# Patient Record
Sex: FEMALE | Race: WHITE | NOT HISPANIC OR LATINO | Employment: OTHER | URBAN - NONMETROPOLITAN AREA
[De-identification: names, ages, dates, MRNs, and addresses within clinical notes are randomized per-mention and may not be internally consistent; named-entity substitution may affect disease eponyms.]

---

## 2017-01-01 ENCOUNTER — HOSPITAL ENCOUNTER (EMERGENCY)
Facility: HOSPITAL | Age: 82
Discharge: HOME | End: 2017-01-01
Attending: EMERGENCY MEDICINE | Admitting: EMERGENCY MEDICINE

## 2017-01-01 ENCOUNTER — APPOINTMENT (OUTPATIENT)
Dept: GENERAL RADIOLOGY | Facility: HOSPITAL | Age: 82
End: 2017-01-01

## 2017-01-01 VITALS
WEIGHT: 155 LBS | HEIGHT: 59 IN | OXYGEN SATURATION: 98 % | SYSTOLIC BLOOD PRESSURE: 163 MMHG | TEMPERATURE: 97.5 F | RESPIRATION RATE: 19 BRPM | HEART RATE: 69 BPM | DIASTOLIC BLOOD PRESSURE: 90 MMHG | BODY MASS INDEX: 31.25 KG/M2

## 2017-01-01 DIAGNOSIS — M25.561 ACUTE PAIN OF RIGHT KNEE: Primary | ICD-10-CM

## 2017-01-01 PROCEDURE — 73562 X-RAY EXAM OF KNEE 3: CPT

## 2017-01-01 PROCEDURE — 99284 EMERGENCY DEPT VISIT MOD MDM: CPT

## 2017-01-01 RX ORDER — HYDROCODONE BITARTRATE AND ACETAMINOPHEN 7.5; 325 MG/1; MG/1
1 TABLET ORAL ONCE
Status: COMPLETED | OUTPATIENT
Start: 2017-01-01 | End: 2017-01-01

## 2017-01-01 RX ORDER — ALENDRONATE SODIUM 70 MG/1
70 TABLET ORAL
COMMUNITY
End: 2019-05-08

## 2017-01-01 RX ORDER — ATORVASTATIN CALCIUM 10 MG/1
10 TABLET, FILM COATED ORAL
COMMUNITY
End: 2019-05-08

## 2017-01-01 RX ORDER — KETOROLAC TROMETHAMINE 10 MG/1
10 TABLET, FILM COATED ORAL EVERY 6 HOURS PRN
Qty: 8 TABLET | Refills: 0 | Status: SHIPPED | OUTPATIENT
Start: 2017-01-01 | End: 2017-05-02 | Stop reason: ALTCHOICE

## 2017-01-01 RX ORDER — LEVOTHYROXINE SODIUM 0.07 MG/1
75 TABLET ORAL DAILY
COMMUNITY
End: 2018-07-28 | Stop reason: HOSPADM

## 2017-01-01 RX ORDER — GLIMEPIRIDE 1 MG/1
1 TABLET ORAL DAILY
COMMUNITY
End: 2018-07-28 | Stop reason: HOSPADM

## 2017-01-01 RX ADMIN — HYDROCODONE BITARTRATE AND ACETAMINOPHEN 1 TABLET: 7.5; 325 TABLET ORAL at 16:32

## 2017-01-01 NOTE — ED PROVIDER NOTES
Subjective   HPI Comments: Patient is an 88-year-old female who reports she has had right knee pain since Friday (12/30/2016), but the pain is worse today.  She reports the pain is worsened by standing.  She denies any acute trauma injury.      History provided by:  Patient (Daughter)      Review of Systems   Constitutional: Negative.    HENT: Negative.    Eyes: Negative.    Respiratory: Negative.    Cardiovascular: Negative.    Gastrointestinal: Negative.    Endocrine: Negative.    Genitourinary: Negative.    Musculoskeletal: Negative.         Right knee pain   Skin: Negative.    Allergic/Immunologic: Negative.    Neurological: Negative.    Hematological: Negative.    Psychiatric/Behavioral: Negative.    All other systems reviewed and are negative.      Past Medical History   Diagnosis Date   • Diabetes mellitus    • Hypertension    • Hypothyroid        Allergies   Allergen Reactions   • Levaquin [Levofloxacin] Rash   • Rocephin [Ceftriaxone] Rash   • Vancomycin Rash       Past Surgical History   Procedure Laterality Date   • Pericardial window         History reviewed. No pertinent family history.    Social History     Social History   • Marital status:      Spouse name: N/A   • Number of children: N/A   • Years of education: N/A     Social History Main Topics   • Smoking status: Former Smoker   • Smokeless tobacco: None   • Alcohol use No   • Drug use: No   • Sexual activity: Not Asked     Other Topics Concern   • None     Social History Narrative   • None           Objective   Physical Exam   Constitutional: She is oriented to person, place, and time. She appears well-developed and well-nourished.   HENT:   Head: Normocephalic and atraumatic.   Right Ear: External ear normal.   Left Ear: External ear normal.   Nose: Nose normal.   Mouth/Throat: Oropharynx is clear and moist.   Eyes: Conjunctivae and EOM are normal. Pupils are equal, round, and reactive to light. Right eye exhibits no discharge. Left eye  exhibits no discharge.   Neck: Normal range of motion. Neck supple. No tracheal deviation present. No thyromegaly present.   Cardiovascular: Normal rate, regular rhythm, normal heart sounds and intact distal pulses.    No murmur heard.  Pulmonary/Chest: Effort normal and breath sounds normal. No respiratory distress. She exhibits no tenderness.   Abdominal: Soft. She exhibits no distension. There is no tenderness.   Musculoskeletal: Normal range of motion. She exhibits tenderness (Right anterior & posterior knee pain with palpation and flexion/extension of the knee.). She exhibits no edema or deformity.   Neurological: She is alert and oriented to person, place, and time. No cranial nerve deficit.   Skin: Skin is warm and dry. No erythema. No pallor.   Psychiatric: She has a normal mood and affect. Judgment normal.   Nursing note and vitals reviewed.      Procedures         ED Course  ED Course                  MDM  Number of Diagnoses or Management Options  Acute pain of right knee: new and requires workup     Amount and/or Complexity of Data Reviewed  Tests in the radiology section of CPT®: ordered and reviewed    Risk of Complications, Morbidity, and/or Mortality  Presenting problems: low  Diagnostic procedures: low  Management options: low    Patient Progress  Patient progress: stable      Final diagnoses:   Acute pain of right knee            Jude Moreno MD  01/01/17 6005

## 2017-01-01 NOTE — ED NOTES
Called JANICE Stone at this time for splint application     Rosa Maria Shaw, ABDIAZIZ  01/01/17 7813

## 2017-01-12 ENCOUNTER — TRANSCRIBE ORDERS (OUTPATIENT)
Dept: ADMINISTRATIVE | Facility: HOSPITAL | Age: 82
End: 2017-01-12

## 2017-01-12 DIAGNOSIS — R60.0 LOCALIZED EDEMA: Primary | ICD-10-CM

## 2017-01-17 ENCOUNTER — HOSPITAL ENCOUNTER (OUTPATIENT)
Dept: ULTRASOUND IMAGING | Facility: HOSPITAL | Age: 82
Discharge: HOME | End: 2017-01-17
Admitting: INTERNAL MEDICINE

## 2017-01-17 DIAGNOSIS — R60.0 LOCALIZED EDEMA: ICD-10-CM

## 2017-01-17 PROCEDURE — 93971 EXTREMITY STUDY: CPT | Performed by: SURGERY

## 2017-01-17 PROCEDURE — 93971 EXTREMITY STUDY: CPT

## 2017-01-18 ENCOUNTER — TRANSCRIBE ORDERS (OUTPATIENT)
Dept: ADMINISTRATIVE | Facility: HOSPITAL | Age: 82
End: 2017-01-18

## 2017-01-18 DIAGNOSIS — M71.21 SYNOVIAL CYST OF RIGHT POPLITEAL SPACE: Primary | ICD-10-CM

## 2017-01-23 ENCOUNTER — HOSPITAL ENCOUNTER (OUTPATIENT)
Dept: MRI IMAGING | Facility: HOSPITAL | Age: 82
Discharge: HOME | End: 2017-01-23
Admitting: INTERNAL MEDICINE

## 2017-01-23 DIAGNOSIS — M71.21 SYNOVIAL CYST OF RIGHT POPLITEAL SPACE: ICD-10-CM

## 2017-01-23 PROCEDURE — 73721 MRI JNT OF LWR EXTRE W/O DYE: CPT

## 2017-05-02 ENCOUNTER — OFFICE VISIT (OUTPATIENT)
Dept: CARDIOLOGY | Facility: CLINIC | Age: 82
End: 2017-05-02

## 2017-05-02 VITALS
HEART RATE: 58 BPM | BODY MASS INDEX: 31.04 KG/M2 | DIASTOLIC BLOOD PRESSURE: 70 MMHG | SYSTOLIC BLOOD PRESSURE: 130 MMHG | WEIGHT: 154 LBS | HEIGHT: 59 IN

## 2017-05-02 DIAGNOSIS — I31.9 PERICARDIAL DISEASE: Primary | ICD-10-CM

## 2017-05-02 DIAGNOSIS — E78.2 MIXED HYPERLIPIDEMIA: ICD-10-CM

## 2017-05-02 DIAGNOSIS — I47.1 ATRIAL TACHYCARDIA (HCC): ICD-10-CM

## 2017-05-02 DIAGNOSIS — I10 ESSENTIAL HYPERTENSION: ICD-10-CM

## 2017-05-02 PROBLEM — I48.91 ATRIAL FIBRILLATION (HCC): Status: ACTIVE | Noted: 2017-05-02

## 2017-05-02 PROCEDURE — 93000 ELECTROCARDIOGRAM COMPLETE: CPT | Performed by: PHYSICIAN ASSISTANT

## 2017-05-02 PROCEDURE — 99214 OFFICE O/P EST MOD 30 MIN: CPT | Performed by: PHYSICIAN ASSISTANT

## 2017-05-02 RX ORDER — ESOMEPRAZOLE MAGNESIUM 40 MG/1
40 CAPSULE, DELAYED RELEASE ORAL DAILY PRN
COMMUNITY
End: 2019-05-08

## 2018-05-04 ENCOUNTER — OFFICE VISIT (OUTPATIENT)
Dept: CARDIOLOGY | Facility: CLINIC | Age: 83
End: 2018-05-04

## 2018-05-04 VITALS
HEIGHT: 60 IN | DIASTOLIC BLOOD PRESSURE: 62 MMHG | WEIGHT: 153 LBS | SYSTOLIC BLOOD PRESSURE: 120 MMHG | BODY MASS INDEX: 30.04 KG/M2 | HEART RATE: 67 BPM

## 2018-05-04 DIAGNOSIS — I10 ESSENTIAL HYPERTENSION: ICD-10-CM

## 2018-05-04 DIAGNOSIS — I47.1 ATRIAL TACHYCARDIA (HCC): Primary | ICD-10-CM

## 2018-05-04 DIAGNOSIS — E78.2 MIXED HYPERLIPIDEMIA: ICD-10-CM

## 2018-05-04 PROBLEM — I31.9 PERICARDIAL DISEASE: Status: RESOLVED | Noted: 2017-05-02 | Resolved: 2018-05-04

## 2018-05-04 PROCEDURE — 93000 ELECTROCARDIOGRAM COMPLETE: CPT | Performed by: INTERNAL MEDICINE

## 2018-05-04 PROCEDURE — 99213 OFFICE O/P EST LOW 20 MIN: CPT | Performed by: INTERNAL MEDICINE

## 2018-05-04 NOTE — PROGRESS NOTES
"Priya Mobley is a 89 y.o. female. FU of rhythm and bp    History of Present Illness     PAT:  Remote. No palpitations over the past year and remains very active for her age. Is compliant with meds that are well tolerated. EKG today is nsc    HLD:  Is followed by pcp with good reports on current statin    HTN:  Good clinic checks on current meds.    T2DM:  Good control by report but Metformin may be a better choice as far as the CV system is concerned. She \"has to be watched for hypoglycemia\" according to her daughter. She will ask her pcp about this.        The following portions of the patient's history were reviewed and updated as appropriate: allergies, current medications, past family history, past medical history, past social history, past surgical history and problem list.    Patient Active Problem List   Diagnosis   • Atrial tachycardia   • Essential hypertension   • Mixed hyperlipidemia       Allergies   Allergen Reactions   • Levaquin [Levofloxacin] Rash   • Rocephin [Ceftriaxone] Rash   • Vancomycin Rash       Family History   Problem Relation Age of Onset   • No Known Problems Mother    • No Known Problems Father        Social History     Social History   • Marital status:      Spouse name: N/A   • Number of children: N/A   • Years of education: N/A     Occupational History   • Not on file.     Social History Main Topics   • Smoking status: Former Smoker   • Smokeless tobacco: Never Used   • Alcohol use No   • Drug use: No   • Sexual activity: Not on file     Other Topics Concern   • Not on file     Social History Narrative   • No narrative on file         Current Outpatient Prescriptions:   •  alendronate (FOSAMAX) 70 MG tablet, Take 70 mg by mouth Every 7 (Seven) Days., Disp: , Rfl:   •  atorvastatin (LIPITOR) 10 MG tablet, Take 10 mg by mouth Daily., Disp: , Rfl:   •  esomeprazole (nexIUM) 40 MG capsule, Take 40 mg by mouth Every Morning Before Breakfast., Disp: , Rfl:   •  " "glimepiride (AMARYL) 1 MG tablet, Take 1 mg by mouth Daily., Disp: , Rfl:   •  levothyroxine (SYNTHROID, LEVOTHROID) 75 MCG tablet, Take 75 mcg by mouth Daily., Disp: , Rfl:   •  metoprolol tartrate (LOPRESSOR) 25 MG tablet, Take 25 mg by mouth Daily., Disp: , Rfl:     Past Surgical History:   Procedure Laterality Date   • BREAST CYST EXCISION     • PERICARDIAL WINDOW         Review of Systems   Constitutional: Negative for fatigue, fever and unexpected weight change.   Respiratory: Negative for apnea, chest tightness and shortness of breath.    Cardiovascular: Negative for chest pain, palpitations and leg swelling.   Gastrointestinal: Negative for abdominal pain.   Genitourinary: Negative for dysuria.   Musculoskeletal: Negative for myalgias.   Neurological: Negative for weakness and light-headedness.   Psychiatric/Behavioral: Negative for sleep disturbance.       /62   Pulse 67   Ht 152.4 cm (60\")   Wt 69.4 kg (153 lb)   BMI 29.88 kg/m²   Procedures    Objective   Physical Exam   Constitutional: She is oriented to person, place, and time. She appears well-developed and well-nourished.   HENT:   Head: Normocephalic.   Eyes: Pupils are equal, round, and reactive to light.   Neck: No thyromegaly present.   Cardiovascular: Normal rate, regular rhythm, normal heart sounds and intact distal pulses.  Exam reveals no gallop and no friction rub.    No murmur heard.  Pulmonary/Chest: Effort normal and breath sounds normal. No respiratory distress. She has no wheezes. She has no rales.   Abdominal: Soft. Bowel sounds are normal. She exhibits no distension and no mass. There is no tenderness. There is no guarding.   Musculoskeletal: She exhibits no edema or tenderness.   Neurological: She is alert and oriented to person, place, and time.   Skin: Skin is warm and dry.   Psychiatric: She has a normal mood and affect.       Assessment/Plan   Rashard was seen today for rapid heart rate, hypertension and " hyperlipidemia.    Diagnoses and all orders for this visit:    Atrial tachycardia  Comments:  NO PALPITATIONS NOW - CPT  Orders:  -     ECG 12 Lead    Essential hypertension  Comments:  CONTROLLED - CPT    Mixed hyperlipidemia  Comments:  CONTROLLED BY REPORT                 Return in about 1 year (around 5/4/2019).  Orders Placed This Encounter   Procedures   • ECG 12 Lead     Order Specific Question:   Reason for Exam:     Answer:   TACHY

## 2018-07-25 ENCOUNTER — APPOINTMENT (OUTPATIENT)
Dept: GENERAL RADIOLOGY | Facility: HOSPITAL | Age: 83
End: 2018-07-25

## 2018-07-25 ENCOUNTER — HOSPITAL ENCOUNTER (EMERGENCY)
Facility: HOSPITAL | Age: 83
Discharge: HOME | End: 2018-07-25
Attending: EMERGENCY MEDICINE | Admitting: EMERGENCY MEDICINE

## 2018-07-25 ENCOUNTER — APPOINTMENT (OUTPATIENT)
Dept: CT IMAGING | Facility: HOSPITAL | Age: 83
End: 2018-07-25

## 2018-07-25 VITALS
BODY MASS INDEX: 30.24 KG/M2 | HEART RATE: 78 BPM | HEIGHT: 59 IN | TEMPERATURE: 98.5 F | WEIGHT: 150 LBS | DIASTOLIC BLOOD PRESSURE: 66 MMHG | OXYGEN SATURATION: 98 % | SYSTOLIC BLOOD PRESSURE: 133 MMHG | RESPIRATION RATE: 18 BRPM

## 2018-07-25 DIAGNOSIS — G45.9 TRANSIENT CEREBRAL ISCHEMIA, UNSPECIFIED TYPE: Primary | ICD-10-CM

## 2018-07-25 LAB
ABO GROUP BLD: NORMAL
ALBUMIN SERPL-MCNC: 4.1 G/DL (ref 3.5–5)
ALBUMIN/GLOB SERPL: 1.5 G/DL (ref 1.1–2.5)
ALP SERPL-CCNC: 73 U/L (ref 24–120)
ALT SERPL W P-5'-P-CCNC: 34 U/L (ref 0–54)
ANION GAP SERPL CALCULATED.3IONS-SCNC: 10 MMOL/L (ref 4–13)
AST SERPL-CCNC: 30 U/L (ref 7–45)
ATMOSPHERIC PRESS: 750 MMHG
BASE EXCESS BLDV CALC-SCNC: 3 MMOL/L (ref 0–2)
BASOPHILS # BLD AUTO: 0.04 10*3/MM3 (ref 0–0.2)
BASOPHILS NFR BLD AUTO: 0.3 % (ref 0–2)
BDY SITE: ABNORMAL
BILIRUB SERPL-MCNC: 0.8 MG/DL (ref 0.1–1)
BILIRUB UR QL STRIP: NEGATIVE
BLD GP AB SCN SERPL QL: NEGATIVE
BODY TEMPERATURE: 37 C
BUN BLD-MCNC: 16 MG/DL (ref 5–21)
BUN/CREAT SERPL: 22.2 (ref 7–25)
CALCIUM SPEC-SCNC: 9.3 MG/DL (ref 8.4–10.4)
CHLORIDE SERPL-SCNC: 98 MMOL/L (ref 98–110)
CLARITY UR: CLEAR
CO2 SERPL-SCNC: 27 MMOL/L (ref 24–31)
COLOR UR: YELLOW
CREAT BLD-MCNC: 0.72 MG/DL (ref 0.5–1.4)
D-LACTATE SERPL-SCNC: 1.1 MMOL/L (ref 0.5–2)
DEPRECATED RDW RBC AUTO: 56.8 FL (ref 40–54)
EOSINOPHIL # BLD AUTO: 0.06 10*3/MM3 (ref 0–0.7)
EOSINOPHIL NFR BLD AUTO: 0.4 % (ref 0–4)
ERYTHROCYTE [DISTWIDTH] IN BLOOD BY AUTOMATED COUNT: 15.5 % (ref 12–15)
GFR SERPL CREATININE-BSD FRML MDRD: 76 ML/MIN/1.73
GLOBULIN UR ELPH-MCNC: 2.7 GM/DL
GLUCOSE BLD-MCNC: 152 MG/DL (ref 70–100)
GLUCOSE BLDC GLUCOMTR-MCNC: 170 MG/DL (ref 70–130)
GLUCOSE UR STRIP-MCNC: NEGATIVE MG/DL
HCO3 BLDV-SCNC: 28.2 MMOL/L (ref 22–28)
HCT VFR BLD AUTO: 39.2 % (ref 37–47)
HGB BLD-MCNC: 12.8 G/DL (ref 12–16)
HGB UR QL STRIP.AUTO: NEGATIVE
HOLD SPECIMEN: NORMAL
HOLD SPECIMEN: NORMAL
IMM GRANULOCYTES # BLD: 0.1 10*3/MM3 (ref 0–0.03)
IMM GRANULOCYTES NFR BLD: 0.7 % (ref 0–5)
INR PPP: 0.96 (ref 0.91–1.09)
KETONES UR QL STRIP: NEGATIVE
LEUKOCYTE ESTERASE UR QL STRIP.AUTO: NEGATIVE
LYMPHOCYTES # BLD AUTO: 0.56 10*3/MM3 (ref 0.72–4.86)
LYMPHOCYTES NFR BLD AUTO: 3.9 % (ref 15–45)
Lab: ABNORMAL
MCH RBC QN AUTO: 32.6 PG (ref 28–32)
MCHC RBC AUTO-ENTMCNC: 32.7 G/DL (ref 33–36)
MCV RBC AUTO: 99.7 FL (ref 82–98)
MODALITY: ABNORMAL
MONOCYTES # BLD AUTO: 0.83 10*3/MM3 (ref 0.19–1.3)
MONOCYTES NFR BLD AUTO: 5.7 % (ref 4–12)
NEUTROPHILS # BLD AUTO: 12.87 10*3/MM3 (ref 1.87–8.4)
NEUTROPHILS NFR BLD AUTO: 89 % (ref 39–78)
NITRITE UR QL STRIP: NEGATIVE
NRBC BLD MANUAL-RTO: 0 /100 WBC (ref 0–0)
PCO2 BLDV: 44.7 MM HG (ref 41–51)
PH BLDV: 7.41 PH UNITS (ref 7.32–7.42)
PH UR STRIP.AUTO: 6.5 [PH] (ref 5–8)
PLATELET # BLD AUTO: 157 10*3/MM3 (ref 130–400)
PMV BLD AUTO: 12.4 FL (ref 6–12)
PO2 BLDV: 19.8 MM HG (ref 27–53)
POTASSIUM BLD-SCNC: 4 MMOL/L (ref 3.5–5.3)
PROT SERPL-MCNC: 6.8 G/DL (ref 6.3–8.7)
PROT UR QL STRIP: NEGATIVE
PROTHROMBIN TIME: 13.1 SECONDS (ref 11.9–14.6)
RBC # BLD AUTO: 3.93 10*6/MM3 (ref 4.2–5.4)
RH BLD: POSITIVE
SAO2 % BLDCOV: 33.3 % (ref 45–75)
SODIUM BLD-SCNC: 135 MMOL/L (ref 135–145)
SP GR UR STRIP: >1.03 (ref 1–1.03)
T&S EXPIRATION DATE: NORMAL
UROBILINOGEN UR QL STRIP: ABNORMAL
VENTILATOR MODE: ABNORMAL
WBC NRBC COR # BLD: 14.46 10*3/MM3 (ref 4.8–10.8)
WHOLE BLOOD HOLD SPECIMEN: NORMAL
WHOLE BLOOD HOLD SPECIMEN: NORMAL

## 2018-07-25 PROCEDURE — 87040 BLOOD CULTURE FOR BACTERIA: CPT | Performed by: EMERGENCY MEDICINE

## 2018-07-25 PROCEDURE — 85025 COMPLETE CBC W/AUTO DIFF WBC: CPT | Performed by: EMERGENCY MEDICINE

## 2018-07-25 PROCEDURE — 93005 ELECTROCARDIOGRAM TRACING: CPT | Performed by: EMERGENCY MEDICINE

## 2018-07-25 PROCEDURE — 71045 X-RAY EXAM CHEST 1 VIEW: CPT

## 2018-07-25 PROCEDURE — 86900 BLOOD TYPING SEROLOGIC ABO: CPT | Performed by: EMERGENCY MEDICINE

## 2018-07-25 PROCEDURE — 70496 CT ANGIOGRAPHY HEAD: CPT

## 2018-07-25 PROCEDURE — 86901 BLOOD TYPING SEROLOGIC RH(D): CPT | Performed by: EMERGENCY MEDICINE

## 2018-07-25 PROCEDURE — 70450 CT HEAD/BRAIN W/O DYE: CPT

## 2018-07-25 PROCEDURE — 85610 PROTHROMBIN TIME: CPT | Performed by: EMERGENCY MEDICINE

## 2018-07-25 PROCEDURE — 81003 URINALYSIS AUTO W/O SCOPE: CPT | Performed by: EMERGENCY MEDICINE

## 2018-07-25 PROCEDURE — 70498 CT ANGIOGRAPHY NECK: CPT

## 2018-07-25 PROCEDURE — 0 IOPAMIDOL PER 1 ML: Performed by: EMERGENCY MEDICINE

## 2018-07-25 PROCEDURE — 82803 BLOOD GASES ANY COMBINATION: CPT

## 2018-07-25 PROCEDURE — 83605 ASSAY OF LACTIC ACID: CPT | Performed by: EMERGENCY MEDICINE

## 2018-07-25 PROCEDURE — 82962 GLUCOSE BLOOD TEST: CPT

## 2018-07-25 PROCEDURE — 80053 COMPREHEN METABOLIC PANEL: CPT | Performed by: EMERGENCY MEDICINE

## 2018-07-25 PROCEDURE — 99284 EMERGENCY DEPT VISIT MOD MDM: CPT

## 2018-07-25 PROCEDURE — 86850 RBC ANTIBODY SCREEN: CPT | Performed by: EMERGENCY MEDICINE

## 2018-07-25 RX ORDER — SODIUM CHLORIDE 0.9 % (FLUSH) 0.9 %
10 SYRINGE (ML) INJECTION AS NEEDED
Status: DISCONTINUED | OUTPATIENT
Start: 2018-07-25 | End: 2018-07-26 | Stop reason: HOSPADM

## 2018-07-25 RX ORDER — HYDROCODONE BITARTRATE AND ACETAMINOPHEN 5; 325 MG/1; MG/1
1 TABLET ORAL EVERY 6 HOURS PRN
Status: ON HOLD | COMMUNITY
End: 2018-07-27 | Stop reason: DRUGHIGH

## 2018-07-25 RX ORDER — AMOXICILLIN 500 MG/1
500 CAPSULE ORAL 3 TIMES DAILY
COMMUNITY
End: 2019-05-08

## 2018-07-25 RX ADMIN — IOPAMIDOL 100 ML: 755 INJECTION, SOLUTION INTRAVENOUS at 18:37

## 2018-07-27 ENCOUNTER — APPOINTMENT (OUTPATIENT)
Dept: GENERAL RADIOLOGY | Facility: HOSPITAL | Age: 83
End: 2018-07-27

## 2018-07-27 ENCOUNTER — HOSPITAL ENCOUNTER (INPATIENT)
Facility: HOSPITAL | Age: 83
LOS: 1 days | Discharge: HOME | End: 2018-07-28
Attending: EMERGENCY MEDICINE | Admitting: FAMILY MEDICINE

## 2018-07-27 ENCOUNTER — APPOINTMENT (OUTPATIENT)
Dept: CARDIOLOGY | Facility: HOSPITAL | Age: 83
End: 2018-07-27
Attending: FAMILY MEDICINE

## 2018-07-27 ENCOUNTER — APPOINTMENT (OUTPATIENT)
Dept: CT IMAGING | Facility: HOSPITAL | Age: 83
End: 2018-07-27

## 2018-07-27 DIAGNOSIS — R09.02 HYPOXIA: ICD-10-CM

## 2018-07-27 DIAGNOSIS — R06.00 DYSPNEA, UNSPECIFIED TYPE: ICD-10-CM

## 2018-07-27 DIAGNOSIS — R91.1 PULMONARY NODULE: ICD-10-CM

## 2018-07-27 DIAGNOSIS — R00.2 PALPITATION: Primary | ICD-10-CM

## 2018-07-27 PROBLEM — I51.7 CARDIOMEGALY: Status: ACTIVE | Noted: 2018-07-27

## 2018-07-27 PROBLEM — E11.9 DIABETES MELLITUS (HCC): Status: ACTIVE | Noted: 2018-07-27

## 2018-07-27 LAB
ALBUMIN SERPL-MCNC: 3.4 G/DL (ref 3.5–5)
ALBUMIN/GLOB SERPL: 1.3 G/DL (ref 1.1–2.5)
ALP SERPL-CCNC: 56 U/L (ref 24–120)
ALT SERPL W P-5'-P-CCNC: 33 U/L (ref 0–54)
ANION GAP SERPL CALCULATED.3IONS-SCNC: 12 MMOL/L (ref 4–13)
ANISOCYTOSIS BLD QL: ABNORMAL
ARTERIAL PATENCY WRIST A: POSITIVE
AST SERPL-CCNC: 38 U/L (ref 7–45)
ATMOSPHERIC PRESS: 753 MMHG
BACTERIA UR QL AUTO: ABNORMAL /HPF
BASE EXCESS BLDA CALC-SCNC: 2.1 MMOL/L (ref 0–2)
BASOPHILS # BLD MANUAL: 0.08 10*3/MM3 (ref 0–0.2)
BASOPHILS NFR BLD AUTO: 1 % (ref 0–2)
BDY SITE: ABNORMAL
BILIRUB SERPL-MCNC: 0.5 MG/DL (ref 0.1–1)
BILIRUB UR QL STRIP: NEGATIVE
BODY TEMPERATURE: 37 C
BUN BLD-MCNC: 23 MG/DL (ref 5–21)
BUN/CREAT SERPL: 33.8 (ref 7–25)
CALCIUM SPEC-SCNC: 8.9 MG/DL (ref 8.4–10.4)
CHLORIDE SERPL-SCNC: 102 MMOL/L (ref 98–110)
CLARITY UR: CLEAR
CO2 SERPL-SCNC: 25 MMOL/L (ref 24–31)
COLOR UR: ABNORMAL
CREAT BLD-MCNC: 0.68 MG/DL (ref 0.5–1.4)
D DIMER PPP FEU-MCNC: 0.68 MG/L (FEU) (ref 0–0.5)
D-LACTATE SERPL-SCNC: 1.5 MMOL/L (ref 0.5–2)
DEPRECATED RDW RBC AUTO: 56.1 FL (ref 40–54)
ELLIPTOCYTES BLD QL SMEAR: ABNORMAL
ERYTHROCYTE [DISTWIDTH] IN BLOOD BY AUTOMATED COUNT: 15.6 % (ref 12–15)
GFR SERPL CREATININE-BSD FRML MDRD: 81 ML/MIN/1.73
GIANT PLATELETS: ABNORMAL
GLOBULIN UR ELPH-MCNC: 2.6 GM/DL
GLUCOSE BLD-MCNC: 266 MG/DL (ref 70–100)
GLUCOSE BLDC GLUCOMTR-MCNC: 110 MG/DL (ref 70–130)
GLUCOSE BLDC GLUCOMTR-MCNC: 135 MG/DL (ref 70–130)
GLUCOSE BLDC GLUCOMTR-MCNC: 175 MG/DL (ref 70–130)
GLUCOSE BLDC GLUCOMTR-MCNC: 58 MG/DL (ref 70–130)
GLUCOSE UR STRIP-MCNC: ABNORMAL MG/DL
HCO3 BLDA-SCNC: 25.9 MMOL/L (ref 20–26)
HCT VFR BLD AUTO: 35.5 % (ref 37–47)
HGB BLD-MCNC: 11.9 G/DL (ref 12–16)
HGB UR QL STRIP.AUTO: NEGATIVE
HOLD SPECIMEN: NORMAL
HOLD SPECIMEN: NORMAL
HYALINE CASTS UR QL AUTO: ABNORMAL /LPF
KETONES UR QL STRIP: NEGATIVE
LEUKOCYTE ESTERASE UR QL STRIP.AUTO: NEGATIVE
LYMPHOCYTES # BLD MANUAL: 0.76 10*3/MM3 (ref 0.72–4.86)
LYMPHOCYTES NFR BLD MANUAL: 10.1 % (ref 4–12)
LYMPHOCYTES NFR BLD MANUAL: 9.1 % (ref 15–45)
Lab: ABNORMAL
MCH RBC QN AUTO: 32.9 PG (ref 28–32)
MCHC RBC AUTO-ENTMCNC: 33.5 G/DL (ref 33–36)
MCV RBC AUTO: 98.1 FL (ref 82–98)
MODALITY: ABNORMAL
MONOCYTES # BLD AUTO: 0.84 10*3/MM3 (ref 0.19–1.3)
NEUTROPHILS # BLD AUTO: 6.49 10*3/MM3 (ref 1.87–8.4)
NEUTROPHILS NFR BLD MANUAL: 76.8 % (ref 39–78)
NEUTS BAND NFR BLD MANUAL: 1 % (ref 0–10)
NITRITE UR QL STRIP: NEGATIVE
NT-PROBNP SERPL-MCNC: 779 PG/ML (ref 0–1800)
PCO2 BLDA: 36.8 MM HG (ref 35–45)
PH BLDA: 7.46 PH UNITS (ref 7.35–7.45)
PH UR STRIP.AUTO: <=5 [PH] (ref 5–8)
PLATELET # BLD AUTO: 139 10*3/MM3 (ref 130–400)
PMV BLD AUTO: 13.6 FL (ref 6–12)
PO2 BLDA: 61.6 MM HG (ref 83–108)
POIKILOCYTOSIS BLD QL SMEAR: ABNORMAL
POTASSIUM BLD-SCNC: 3.8 MMOL/L (ref 3.5–5.3)
PROT SERPL-MCNC: 6 G/DL (ref 6.3–8.7)
PROT UR QL STRIP: ABNORMAL
RBC # BLD AUTO: 3.62 10*6/MM3 (ref 4.2–5.4)
RBC # UR: ABNORMAL /HPF
REF LAB TEST METHOD: ABNORMAL
SAO2 % BLDCOA: 93.7 % (ref 94–99)
SODIUM BLD-SCNC: 139 MMOL/L (ref 135–145)
SP GR UR STRIP: >=1.03 (ref 1–1.03)
SQUAMOUS #/AREA URNS HPF: ABNORMAL /HPF
TROPONIN I SERPL-MCNC: 0.05 NG/ML (ref 0–0.03)
TROPONIN I SERPL-MCNC: 0.05 NG/ML (ref 0–0.03)
TROPONIN I SERPL-MCNC: <0.012 NG/ML (ref 0–0.03)
UROBILINOGEN UR QL STRIP: ABNORMAL
VARIANT LYMPHS NFR BLD MANUAL: 2 % (ref 0–5)
VENTILATOR MODE: ABNORMAL
WBC MORPH BLD: NORMAL
WBC NRBC COR # BLD: 8.34 10*3/MM3 (ref 4.8–10.8)
WBC UR QL AUTO: ABNORMAL /HPF
WHOLE BLOOD HOLD SPECIMEN: NORMAL
WHOLE BLOOD HOLD SPECIMEN: NORMAL

## 2018-07-27 PROCEDURE — 83605 ASSAY OF LACTIC ACID: CPT | Performed by: EMERGENCY MEDICINE

## 2018-07-27 PROCEDURE — 36600 WITHDRAWAL OF ARTERIAL BLOOD: CPT

## 2018-07-27 PROCEDURE — 25810000003 SODIUM CHLORIDE 0.9 % WITH KCL 20 MEQ 20-0.9 MEQ/L-% SOLUTION: Performed by: FAMILY MEDICINE

## 2018-07-27 PROCEDURE — 85025 COMPLETE CBC W/AUTO DIFF WBC: CPT | Performed by: EMERGENCY MEDICINE

## 2018-07-27 PROCEDURE — 36415 COLL VENOUS BLD VENIPUNCTURE: CPT

## 2018-07-27 PROCEDURE — 25010000002 ENOXAPARIN PER 10 MG: Performed by: FAMILY MEDICINE

## 2018-07-27 PROCEDURE — G8987 SELF CARE CURRENT STATUS: HCPCS | Performed by: OCCUPATIONAL THERAPIST

## 2018-07-27 PROCEDURE — 93010 ELECTROCARDIOGRAM REPORT: CPT | Performed by: INTERNAL MEDICINE

## 2018-07-27 PROCEDURE — 0 IOPAMIDOL PER 1 ML: Performed by: EMERGENCY MEDICINE

## 2018-07-27 PROCEDURE — 82962 GLUCOSE BLOOD TEST: CPT

## 2018-07-27 PROCEDURE — 87040 BLOOD CULTURE FOR BACTERIA: CPT | Performed by: EMERGENCY MEDICINE

## 2018-07-27 PROCEDURE — 85379 FIBRIN DEGRADATION QUANT: CPT | Performed by: EMERGENCY MEDICINE

## 2018-07-27 PROCEDURE — 83880 ASSAY OF NATRIURETIC PEPTIDE: CPT | Performed by: EMERGENCY MEDICINE

## 2018-07-27 PROCEDURE — 82803 BLOOD GASES ANY COMBINATION: CPT

## 2018-07-27 PROCEDURE — 93306 TTE W/DOPPLER COMPLETE: CPT

## 2018-07-27 PROCEDURE — 71275 CT ANGIOGRAPHY CHEST: CPT

## 2018-07-27 PROCEDURE — 84484 ASSAY OF TROPONIN QUANT: CPT | Performed by: EMERGENCY MEDICINE

## 2018-07-27 PROCEDURE — 84484 ASSAY OF TROPONIN QUANT: CPT | Performed by: FAMILY MEDICINE

## 2018-07-27 PROCEDURE — 85007 BL SMEAR W/DIFF WBC COUNT: CPT | Performed by: EMERGENCY MEDICINE

## 2018-07-27 PROCEDURE — G8988 SELF CARE GOAL STATUS: HCPCS | Performed by: OCCUPATIONAL THERAPIST

## 2018-07-27 PROCEDURE — P9612 CATHETERIZE FOR URINE SPEC: HCPCS

## 2018-07-27 PROCEDURE — 94760 N-INVAS EAR/PLS OXIMETRY 1: CPT

## 2018-07-27 PROCEDURE — 93005 ELECTROCARDIOGRAM TRACING: CPT | Performed by: EMERGENCY MEDICINE

## 2018-07-27 PROCEDURE — 94799 UNLISTED PULMONARY SVC/PX: CPT

## 2018-07-27 PROCEDURE — 80053 COMPREHEN METABOLIC PANEL: CPT | Performed by: EMERGENCY MEDICINE

## 2018-07-27 PROCEDURE — 81001 URINALYSIS AUTO W/SCOPE: CPT | Performed by: EMERGENCY MEDICINE

## 2018-07-27 PROCEDURE — 93306 TTE W/DOPPLER COMPLETE: CPT | Performed by: INTERNAL MEDICINE

## 2018-07-27 PROCEDURE — 97165 OT EVAL LOW COMPLEX 30 MIN: CPT | Performed by: OCCUPATIONAL THERAPIST

## 2018-07-27 PROCEDURE — 71045 X-RAY EXAM CHEST 1 VIEW: CPT

## 2018-07-27 PROCEDURE — 99284 EMERGENCY DEPT VISIT MOD MDM: CPT

## 2018-07-27 RX ORDER — GLIPIZIDE 5 MG/1
2.5 TABLET ORAL
Status: DISCONTINUED | OUTPATIENT
Start: 2018-07-28 | End: 2018-07-28 | Stop reason: HOSPADM

## 2018-07-27 RX ORDER — FAMOTIDINE 10 MG/ML
20 INJECTION, SOLUTION INTRAVENOUS 2 TIMES DAILY
Status: DISCONTINUED | OUTPATIENT
Start: 2018-07-27 | End: 2018-07-28 | Stop reason: HOSPADM

## 2018-07-27 RX ORDER — ATORVASTATIN CALCIUM 10 MG/1
10 TABLET, FILM COATED ORAL NIGHTLY
Status: DISCONTINUED | OUTPATIENT
Start: 2018-07-27 | End: 2018-07-28 | Stop reason: HOSPADM

## 2018-07-27 RX ORDER — LEVOTHYROXINE SODIUM 0.07 MG/1
75 TABLET ORAL
Status: DISCONTINUED | OUTPATIENT
Start: 2018-07-28 | End: 2018-07-28 | Stop reason: HOSPADM

## 2018-07-27 RX ORDER — NICOTINE POLACRILEX 4 MG
15 LOZENGE BUCCAL
Status: DISCONTINUED | OUTPATIENT
Start: 2018-07-27 | End: 2018-07-27

## 2018-07-27 RX ORDER — AMOXICILLIN 500 MG/1
500 CAPSULE ORAL EVERY 8 HOURS SCHEDULED
Status: DISCONTINUED | OUTPATIENT
Start: 2018-07-27 | End: 2018-07-28 | Stop reason: HOSPADM

## 2018-07-27 RX ORDER — AMOXICILLIN 500 MG/1
500 CAPSULE ORAL EVERY 8 HOURS SCHEDULED
Status: DISCONTINUED | OUTPATIENT
Start: 2018-07-27 | End: 2018-07-27

## 2018-07-27 RX ORDER — DEXTROSE MONOHYDRATE 25 G/50ML
25 INJECTION, SOLUTION INTRAVENOUS
Status: DISCONTINUED | OUTPATIENT
Start: 2018-07-27 | End: 2018-07-27

## 2018-07-27 RX ORDER — SODIUM CHLORIDE 0.9 % (FLUSH) 0.9 %
1-10 SYRINGE (ML) INJECTION AS NEEDED
Status: DISCONTINUED | OUTPATIENT
Start: 2018-07-27 | End: 2018-07-28 | Stop reason: HOSPADM

## 2018-07-27 RX ORDER — HYDROCODONE BITARTRATE AND ACETAMINOPHEN 7.5; 325 MG/1; MG/1
0.5 TABLET ORAL EVERY 6 HOURS PRN
COMMUNITY
End: 2018-07-28 | Stop reason: HOSPADM

## 2018-07-27 RX ORDER — NICOTINE POLACRILEX 4 MG
15 LOZENGE BUCCAL
Status: DISCONTINUED | OUTPATIENT
Start: 2018-07-27 | End: 2018-07-28 | Stop reason: HOSPADM

## 2018-07-27 RX ORDER — DEXTROSE MONOHYDRATE 25 G/50ML
25 INJECTION, SOLUTION INTRAVENOUS
Status: DISCONTINUED | OUTPATIENT
Start: 2018-07-27 | End: 2018-07-28 | Stop reason: HOSPADM

## 2018-07-27 RX ORDER — SODIUM CHLORIDE AND POTASSIUM CHLORIDE 150; 900 MG/100ML; MG/100ML
50 INJECTION, SOLUTION INTRAVENOUS CONTINUOUS
Status: DISCONTINUED | OUTPATIENT
Start: 2018-07-27 | End: 2018-07-28 | Stop reason: HOSPADM

## 2018-07-27 RX ADMIN — AMOXICILLIN 500 MG: 500 CAPSULE ORAL at 15:29

## 2018-07-27 RX ADMIN — METOPROLOL TARTRATE 25 MG: 25 TABLET, FILM COATED ORAL at 15:29

## 2018-07-27 RX ADMIN — ENOXAPARIN SODIUM 40 MG: 40 INJECTION SUBCUTANEOUS at 17:26

## 2018-07-27 RX ADMIN — AMOXICILLIN 500 MG: 500 CAPSULE ORAL at 21:23

## 2018-07-27 RX ADMIN — IOPAMIDOL 100 ML: 755 INJECTION, SOLUTION INTRAVENOUS at 11:05

## 2018-07-27 RX ADMIN — FAMOTIDINE 20 MG: 10 INJECTION, SOLUTION INTRAVENOUS at 21:23

## 2018-07-27 RX ADMIN — SODIUM CHLORIDE AND POTASSIUM CHLORIDE 50 ML/HR: 9; 1.49 INJECTION, SOLUTION INTRAVENOUS at 15:28

## 2018-07-27 RX ADMIN — ATORVASTATIN CALCIUM 10 MG: 10 TABLET, FILM COATED ORAL at 21:23

## 2018-07-28 VITALS
BODY MASS INDEX: 34.68 KG/M2 | SYSTOLIC BLOOD PRESSURE: 155 MMHG | DIASTOLIC BLOOD PRESSURE: 75 MMHG | WEIGHT: 172 LBS | OXYGEN SATURATION: 94 % | RESPIRATION RATE: 18 BRPM | TEMPERATURE: 98.3 F | HEIGHT: 59 IN | HEART RATE: 68 BPM

## 2018-07-28 LAB
ALBUMIN SERPL-MCNC: 3.1 G/DL (ref 3.5–5)
ALBUMIN/GLOB SERPL: 1.3 G/DL (ref 1.1–2.5)
ALP SERPL-CCNC: 49 U/L (ref 24–120)
ALT SERPL W P-5'-P-CCNC: 37 U/L (ref 0–54)
ANION GAP SERPL CALCULATED.3IONS-SCNC: 8 MMOL/L (ref 4–13)
ANISOCYTOSIS BLD QL: ABNORMAL
ARTICHOKE IGE QN: 48 MG/DL (ref 0–99)
AST SERPL-CCNC: 30 U/L (ref 7–45)
BH CV ECHO MEAS - AO MAX PG (FULL): 0.77 MMHG
BH CV ECHO MEAS - AO MAX PG: 4.6 MMHG
BH CV ECHO MEAS - AO MEAN PG (FULL): 1 MMHG
BH CV ECHO MEAS - AO MEAN PG: 3 MMHG
BH CV ECHO MEAS - AO ROOT AREA (BSA CORRECTED): 2.3
BH CV ECHO MEAS - AO ROOT AREA: 11.3 CM^2
BH CV ECHO MEAS - AO ROOT DIAM: 3.8 CM
BH CV ECHO MEAS - AO V2 MAX: 107 CM/SEC
BH CV ECHO MEAS - AO V2 MEAN: 75.3 CM/SEC
BH CV ECHO MEAS - AO V2 VTI: 26.4 CM
BH CV ECHO MEAS - AVA(I,A): 2.6 CM^2
BH CV ECHO MEAS - AVA(I,D): 2.6 CM^2
BH CV ECHO MEAS - AVA(V,A): 2.9 CM^2
BH CV ECHO MEAS - AVA(V,D): 2.9 CM^2
BH CV ECHO MEAS - BSA(HAYCOCK): 1.7 M^2
BH CV ECHO MEAS - BSA: 1.7 M^2
BH CV ECHO MEAS - BZI_BMI: 31.1 KILOGRAMS/M^2
BH CV ECHO MEAS - BZI_METRIC_HEIGHT: 149.9 CM
BH CV ECHO MEAS - BZI_METRIC_WEIGHT: 69.9 KG
BH CV ECHO MEAS - CONTRAST EF 4CH: 62.5 ML/M^2
BH CV ECHO MEAS - EDV(CUBED): 108.5 ML
BH CV ECHO MEAS - EDV(MOD-SP4): 28 ML
BH CV ECHO MEAS - EDV(TEICH): 106 ML
BH CV ECHO MEAS - EF(CUBED): 72.8 %
BH CV ECHO MEAS - EF(MOD-SP4): 62.5 %
BH CV ECHO MEAS - EF(TEICH): 64.5 %
BH CV ECHO MEAS - ESV(CUBED): 29.5 ML
BH CV ECHO MEAS - ESV(MOD-SP4): 10.5 ML
BH CV ECHO MEAS - ESV(TEICH): 37.6 ML
BH CV ECHO MEAS - FS: 35.2 %
BH CV ECHO MEAS - IVS/LVPW: 1.2
BH CV ECHO MEAS - IVSD: 1.3 CM
BH CV ECHO MEAS - LA DIMENSION: 3.3 CM
BH CV ECHO MEAS - LA/AO: 0.87
BH CV ECHO MEAS - LAT PEAK E' VEL: 9.1 CM/SEC
BH CV ECHO MEAS - LV DIASTOLIC VOL/BSA (35-75): 17 ML/M^2
BH CV ECHO MEAS - LV MASS(C)D: 214.4 GRAMS
BH CV ECHO MEAS - LV MASS(C)DI: 129.9 GRAMS/M^2
BH CV ECHO MEAS - LV MAX PG: 3.8 MMHG
BH CV ECHO MEAS - LV MEAN PG: 2 MMHG
BH CV ECHO MEAS - LV SYSTOLIC VOL/BSA (12-30): 6.4 ML/M^2
BH CV ECHO MEAS - LV V1 MAX: 97.6 CM/SEC
BH CV ECHO MEAS - LV V1 MEAN: 63.2 CM/SEC
BH CV ECHO MEAS - LV V1 VTI: 21.5 CM
BH CV ECHO MEAS - LVIDD: 4.8 CM
BH CV ECHO MEAS - LVIDS: 3.1 CM
BH CV ECHO MEAS - LVLD AP4: 6.4 CM
BH CV ECHO MEAS - LVLS AP4: 5.6 CM
BH CV ECHO MEAS - LVOT AREA (M): 3.1 CM^2
BH CV ECHO MEAS - LVOT AREA: 3.1 CM^2
BH CV ECHO MEAS - LVOT DIAM: 2 CM
BH CV ECHO MEAS - LVPWD: 1.2 CM
BH CV ECHO MEAS - MED PEAK E' VEL: 4.9 CM/SEC
BH CV ECHO MEAS - MR ALIAS VEL: 30.8 CM/SEC
BH CV ECHO MEAS - MR ERO: 0.03 CM^2
BH CV ECHO MEAS - MR FLOW RATE: 17.4 CM^3/SEC
BH CV ECHO MEAS - MR MAX PG: 106.1 MMHG
BH CV ECHO MEAS - MR MAX VEL: 515 CM/SEC
BH CV ECHO MEAS - MR MEAN PG: 76 MMHG
BH CV ECHO MEAS - MR MEAN VEL: 419 CM/SEC
BH CV ECHO MEAS - MR PISA RADIUS: 0.3 CM
BH CV ECHO MEAS - MR PISA: 0.57 CM^2
BH CV ECHO MEAS - MR VOLUME: 6.6 ML
BH CV ECHO MEAS - MR VTI: 195 CM
BH CV ECHO MEAS - MV A MAX VEL: 64.3 CM/SEC
BH CV ECHO MEAS - MV DEC TIME: 0.25 SEC
BH CV ECHO MEAS - MV E MAX VEL: 100 CM/SEC
BH CV ECHO MEAS - MV E/A: 1.6
BH CV ECHO MEAS - PA MAX PG: 1.4 MMHG
BH CV ECHO MEAS - PA V2 MAX: 59.2 CM/SEC
BH CV ECHO MEAS - RAP SYSTOLE: 5 MMHG
BH CV ECHO MEAS - RVSP: 35.9 MMHG
BH CV ECHO MEAS - SI(AO): 181.4 ML/M^2
BH CV ECHO MEAS - SI(CUBED): 47.9 ML/M^2
BH CV ECHO MEAS - SI(LVOT): 40.9 ML/M^2
BH CV ECHO MEAS - SI(MOD-SP4): 10.6 ML/M^2
BH CV ECHO MEAS - SI(TEICH): 41.4 ML/M^2
BH CV ECHO MEAS - SV(AO): 299.4 ML
BH CV ECHO MEAS - SV(CUBED): 79 ML
BH CV ECHO MEAS - SV(LVOT): 67.5 ML
BH CV ECHO MEAS - SV(MOD-SP4): 17.5 ML
BH CV ECHO MEAS - SV(TEICH): 68.3 ML
BH CV ECHO MEAS - TR MAX VEL: 278 CM/SEC
BH CV ECHO MEASUREMENTS AVERAGE E/E' RATIO: 14.29
BILIRUB SERPL-MCNC: 0.4 MG/DL (ref 0.1–1)
BUN BLD-MCNC: 18 MG/DL (ref 5–21)
BUN/CREAT SERPL: 25.7 (ref 7–25)
CALCIUM SPEC-SCNC: 8.8 MG/DL (ref 8.4–10.4)
CHLORIDE SERPL-SCNC: 107 MMOL/L (ref 98–110)
CHOLEST SERPL-MCNC: 105 MG/DL (ref 130–200)
CO2 SERPL-SCNC: 27 MMOL/L (ref 24–31)
CREAT BLD-MCNC: 0.7 MG/DL (ref 0.5–1.4)
DEPRECATED RDW RBC AUTO: 56.2 FL (ref 40–54)
EOSINOPHIL # BLD MANUAL: 0.18 10*3/MM3 (ref 0–0.7)
EOSINOPHIL NFR BLD MANUAL: 3 % (ref 0–4)
ERYTHROCYTE [DISTWIDTH] IN BLOOD BY AUTOMATED COUNT: 15.5 % (ref 12–15)
GFR SERPL CREATININE-BSD FRML MDRD: 79 ML/MIN/1.73
GIANT PLATELETS: ABNORMAL
GLOBULIN UR ELPH-MCNC: 2.4 GM/DL
GLUCOSE BLD-MCNC: 105 MG/DL (ref 70–100)
GLUCOSE BLDC GLUCOMTR-MCNC: 111 MG/DL (ref 70–130)
GLUCOSE BLDC GLUCOMTR-MCNC: 93 MG/DL (ref 70–130)
HBA1C MFR BLD: 6 %
HCT VFR BLD AUTO: 33.4 % (ref 37–47)
HDLC SERPL-MCNC: 40 MG/DL
HGB BLD-MCNC: 10.8 G/DL (ref 12–16)
LDLC/HDLC SERPL: 1.09 {RATIO}
LEFT ATRIUM VOLUME INDEX: 30.1 ML/M2
LEFT ATRIUM VOLUME: 49.7 CM3
LV EF 2D ECHO EST: 65 %
LYMPHOCYTES # BLD MANUAL: 1.48 10*3/MM3 (ref 0.72–4.86)
LYMPHOCYTES NFR BLD MANUAL: 14 % (ref 4–12)
LYMPHOCYTES NFR BLD MANUAL: 24 % (ref 15–45)
MAXIMAL PREDICTED HEART RATE: 130 BPM
MCH RBC QN AUTO: 32 PG (ref 28–32)
MCHC RBC AUTO-ENTMCNC: 32.3 G/DL (ref 33–36)
MCV RBC AUTO: 99.1 FL (ref 82–98)
MONOCYTES # BLD AUTO: 0.86 10*3/MM3 (ref 0.19–1.3)
NEUTROPHILS # BLD AUTO: 3.63 10*3/MM3 (ref 1.87–8.4)
NEUTROPHILS NFR BLD MANUAL: 59 % (ref 39–78)
PLATELET # BLD AUTO: 137 10*3/MM3 (ref 130–400)
PMV BLD AUTO: 12.9 FL (ref 6–12)
POIKILOCYTOSIS BLD QL SMEAR: ABNORMAL
POTASSIUM BLD-SCNC: 4.3 MMOL/L (ref 3.5–5.3)
PROT SERPL-MCNC: 5.5 G/DL (ref 6.3–8.7)
RBC # BLD AUTO: 3.37 10*6/MM3 (ref 4.2–5.4)
SODIUM BLD-SCNC: 142 MMOL/L (ref 135–145)
STRESS TARGET HR: 111 BPM
TOXIC GRANULATION: ABNORMAL
TRIGL SERPL-MCNC: 107 MG/DL (ref 0–149)
TSH SERPL DL<=0.05 MIU/L-ACNC: 4.78 MIU/ML (ref 0.47–4.68)
WBC NRBC COR # BLD: 6.16 10*3/MM3 (ref 4.8–10.8)

## 2018-07-28 PROCEDURE — 83036 HEMOGLOBIN GLYCOSYLATED A1C: CPT | Performed by: FAMILY MEDICINE

## 2018-07-28 PROCEDURE — 85025 COMPLETE CBC W/AUTO DIFF WBC: CPT | Performed by: FAMILY MEDICINE

## 2018-07-28 PROCEDURE — 94799 UNLISTED PULMONARY SVC/PX: CPT

## 2018-07-28 PROCEDURE — 84443 ASSAY THYROID STIM HORMONE: CPT | Performed by: FAMILY MEDICINE

## 2018-07-28 PROCEDURE — 80053 COMPREHEN METABOLIC PANEL: CPT | Performed by: FAMILY MEDICINE

## 2018-07-28 PROCEDURE — 80061 LIPID PANEL: CPT | Performed by: FAMILY MEDICINE

## 2018-07-28 PROCEDURE — 85007 BL SMEAR W/DIFF WBC COUNT: CPT | Performed by: FAMILY MEDICINE

## 2018-07-28 PROCEDURE — 94760 N-INVAS EAR/PLS OXIMETRY 1: CPT

## 2018-07-28 PROCEDURE — 82962 GLUCOSE BLOOD TEST: CPT

## 2018-07-28 RX ORDER — LEVOTHYROXINE SODIUM 0.1 MG/1
100 TABLET ORAL DAILY
Qty: 30 TABLET | Refills: 2 | Status: ON HOLD | OUTPATIENT
Start: 2018-07-28 | End: 2020-01-01

## 2018-07-28 RX ADMIN — FAMOTIDINE 20 MG: 10 INJECTION, SOLUTION INTRAVENOUS at 08:56

## 2018-07-28 RX ADMIN — AMOXICILLIN 500 MG: 500 CAPSULE ORAL at 05:32

## 2018-07-28 RX ADMIN — AMOXICILLIN 500 MG: 500 CAPSULE ORAL at 13:03

## 2018-07-28 RX ADMIN — METOPROLOL TARTRATE 25 MG: 25 TABLET, FILM COATED ORAL at 08:56

## 2018-07-28 RX ADMIN — LEVOTHYROXINE SODIUM 75 MCG: 75 TABLET ORAL at 05:32

## 2018-07-28 RX ADMIN — GLIPIZIDE 2.5 MG: 5 TABLET ORAL at 08:56

## 2018-07-30 LAB
BACTERIA SPEC AEROBE CULT: NORMAL
BACTERIA SPEC AEROBE CULT: NORMAL

## 2018-08-01 LAB
BACTERIA SPEC AEROBE CULT: NORMAL
BACTERIA SPEC AEROBE CULT: NORMAL

## 2018-08-03 NOTE — ED PROVIDER NOTES
Subjective   History of Present Illness     89 year old F c/o having an episode of confused speech.    Pt accompanied by her .     states todaya they were driving and she began saying several sentences which did not make sense. He did not notice any other issue with her.    At the time of my interview,  states she is acting normal. Pt has no complaints and is aaox3.    Review of Systems   Constitutional: Negative for activity change, chills, fatigue and fever.   HENT: Negative for drooling, ear discharge, ear pain, hearing loss, sinus pain, sinus pressure, sneezing, sore throat, tinnitus, trouble swallowing and voice change.    Eyes: Negative for photophobia, pain, discharge, redness, itching and visual disturbance.   Respiratory: Negative for chest tightness and shortness of breath.    Cardiovascular: Negative for chest pain and leg swelling.   Gastrointestinal: Negative for abdominal pain, nausea and vomiting.   Musculoskeletal: Negative for arthralgias, back pain, gait problem, joint swelling, myalgias, neck pain and neck stiffness.   Skin: Negative for rash.   Neurological: Negative for dizziness, tremors, seizures, syncope, facial asymmetry, speech difficulty, weakness, light-headedness, numbness and headaches.   Psychiatric/Behavioral: Positive for confusion. Negative for agitation, behavioral problems and hallucinations.       Past Medical History:   Diagnosis Date   • Diabetes mellitus (CMS/HCC)    • Dysrhythmia, cardiac    • History of pericarditis    • Hypertension    • Hypothyroid    • Palpitations     : PAR AFIB   • Paroxysmal atrial tachycardia (CMS/HCC)    • Pericardial disease        Allergies   Allergen Reactions   • Levaquin [Levofloxacin] Rash   • Rocephin [Ceftriaxone] Rash   • Vancomycin Rash       Past Surgical History:   Procedure Laterality Date   • BREAST CYST EXCISION     • PERICARDIAL WINDOW         Family History   Problem Relation Age of Onset   • Cancer Mother    •  Cancer Father    • Cancer Sister    • Cancer Brother    • Cancer Sister        Social History     Social History   • Marital status:      Social History Main Topics   • Smoking status: Former Smoker   • Smokeless tobacco: Never Used      Comment: quit 40 years ago   • Alcohol use No   • Drug use: No     Other Topics Concern   • Not on file           Objective   Physical Exam   Constitutional: She is oriented to person, place, and time. She appears well-developed and well-nourished.   HENT:   Head: Atraumatic.   Eyes: Pupils are equal, round, and reactive to light. EOM are normal.   Neck: Normal range of motion.   Cardiovascular: Normal rate, regular rhythm, normal heart sounds and intact distal pulses.  Exam reveals no gallop and no friction rub.    No murmur heard.  Pulmonary/Chest: Effort normal. No respiratory distress.   Abdominal: Soft. She exhibits no distension. There is no tenderness. There is no guarding.   Lymphadenopathy:     She has no cervical adenopathy.   Neurological: She is alert and oriented to person, place, and time. She has normal reflexes. She displays no atrophy, no tremor and normal reflexes. No cranial nerve deficit or sensory deficit. She exhibits normal muscle tone. She displays a negative Romberg sign. She displays no seizure activity. Coordination and gait normal. GCS eye subscore is 4. GCS verbal subscore is 5. GCS motor subscore is 6.   Reflex Scores:       Tricep reflexes are 2+ on the right side and 2+ on the left side.       Bicep reflexes are 2+ on the right side and 2+ on the left side.       Brachioradialis reflexes are 2+ on the right side and 2+ on the left side.       Patellar reflexes are 2+ on the right side and 2+ on the left side.       Achilles reflexes are 2+ on the right side and 2+ on the left side.  Skin: No rash noted.   Psychiatric: She has a normal mood and affect. Her behavior is normal. Judgment and thought content normal.   Nursing note and vitals  reviewed.      Procedures           ED Course        Labs Reviewed   COMPREHENSIVE METABOLIC PANEL - Abnormal; Notable for the following:        Result Value    Glucose 152 (*)     All other components within normal limits    Narrative:     The MDRD GFR formula is only valid for adults with stable renal function between ages 18 and 70.   CBC WITH AUTO DIFFERENTIAL - Abnormal; Notable for the following:     WBC 14.46 (*)     RBC 3.93 (*)     MCV 99.7 (*)     MCH 32.6 (*)     MCHC 32.7 (*)     RDW 15.5 (*)     RDW-SD 56.8 (*)     MPV 12.4 (*)     Neutrophil % 89.0 (*)     Lymphocyte % 3.9 (*)     Neutrophils, Absolute 12.87 (*)     Lymphocytes, Absolute 0.56 (*)     Immature Grans, Absolute 0.10 (*)     All other components within normal limits   URINALYSIS W/ MICROSCOPIC IF INDICATED (NO CULTURE) - Abnormal; Notable for the following:     Specific Gravity, UA >1.030 (*)     All other components within normal limits    Narrative:     Urine microscopic not indicated.   BLOOD GAS, VENOUS - Abnormal; Notable for the following:     pO2, Venous 19.8 (*)     HCO3, Venous 28.2 (*)     Base Excess, Venous 3.0 (*)     O2 Saturation, Venous 33.3 (*)     All other components within normal limits   POCT GLUCOSE FINGERSTICK - Abnormal; Notable for the following:     Glucose 170 (*)     All other components within normal limits   BLOOD CULTURE WITH LUIS - Normal   BLOOD CULTURE WITH LUIS - Normal   PROTIME-INR - Normal   LACTIC ACID, PLASMA - Normal   RAINBOW DRAW    Narrative:     The following orders were created for panel order [facility] Draw.  Procedure                               Abnormality         Status                     ---------                               -----------         ------                     Light Blue Top[[id number]]                                   Final result               Green Top (Gel)[[id number]]                                  Final result               Lavender Top[[id number]]                                      Final result               Red Top[[id number]]                                          Final result                 Please view results for these tests on the individual orders.   BLOOD GAS, VENOUS   POCT GLUCOSE FINGERSTICK   TYPE AND SCREEN   CBC AND DIFFERENTIAL    Narrative:     The following orders were created for panel order CBC & Differential.  Procedure                               Abnormality         Status                     ---------                               -----------         ------                     CBC Auto Differential[[id number]]        Abnormal            Final result                 Please view results for these tests on the individual orders.   LIGHT BLUE TOP   GREEN TOP   LAVENDER TOP   RED TOP       CT Angiogram Neck With & Without Contrast   Final Result   1. Pleural thickening at the right lung apex with subpleural nodularity   measuring approximate 1.2 cm. This may represent chronic fibrosis but   neoplasm cannot be excluded. Consider follow-up with PET/CT or short   interval chest CT.   2. No evidence of a flow-limiting carotid or vertebral stenosis or   evidence of dissection. Minimal calcified plaque is present at the   carotid bifurcations.   3. Tiny 2.5 mm outpouching along the left lateral margin of the   cavernous portion of the left distal ICA, possibly a small aneurysm   without evidence of rupture.           This report was finalized on 07/25/2018 21:10 by Dr. Porfirio Moreau MD.      CT Angiogram Head With & Without Contrast   Final Result   1. Small 2.5 mm outpouching along the left lateral margin of the   cavernous portion of the left internal carotid artery, possibly a small   aneurysm, with no evidence of rupture.   2. No significant intracranial arterial steno-occlusive disease.           This report was finalized on 07/25/2018 21:05 by Dr. Porfirio Moreau MD.      XR Chest 1 View   Final Result   Reticulonodular interstitial infiltrates with no lung    consolidation, this has a wide differential diagnosis, which includes   interstitial pneumonia as well as metastatic disease. Close follow-up is   recommended. There is borderline cardiomegaly without overt CHF.   This report was finalized on 07/25/2018 19:19 by Dr. Porfirio Moreau MD.      CT Head Without Contrast Stroke Protocol   Final Result   No acute intracranial abnormality. MRI is more sensitive for   acute stroke. There are chronic findings associated with aging.           This report was finalized on 07/25/2018 19:17 by Dr. Porfirio Moreau MD.        Labs,imaging ok.     Its possible the patient had a TIA. I offerd the family admssion butu they stated they wished to go home. They will fu with pcp.          MDM      Final diagnoses:   Transient cerebral ischemia, unspecified type            Mihai Serrano MD  08/03/18 2133

## 2018-09-11 ENCOUNTER — TRANSCRIBE ORDERS (OUTPATIENT)
Dept: ADMINISTRATIVE | Facility: HOSPITAL | Age: 83
End: 2018-09-11

## 2018-09-11 DIAGNOSIS — R91.1 LUNG NODULE: Primary | ICD-10-CM

## 2018-09-13 ENCOUNTER — APPOINTMENT (OUTPATIENT)
Dept: CT IMAGING | Facility: HOSPITAL | Age: 83
End: 2018-09-13
Attending: EMERGENCY MEDICINE

## 2018-11-06 ENCOUNTER — HOSPITAL ENCOUNTER (OUTPATIENT)
Dept: CT IMAGING | Facility: HOSPITAL | Age: 83
Discharge: HOME | End: 2018-11-06
Attending: EMERGENCY MEDICINE | Admitting: EMERGENCY MEDICINE

## 2018-11-06 DIAGNOSIS — R91.1 LUNG NODULE: ICD-10-CM

## 2018-11-06 LAB — CREAT BLDA-MCNC: 0.8 MG/DL (ref 0.6–1.3)

## 2018-11-06 PROCEDURE — 82565 ASSAY OF CREATININE: CPT

## 2018-11-06 PROCEDURE — 71260 CT THORAX DX C+: CPT

## 2018-11-06 PROCEDURE — 25010000002 IOPAMIDOL 61 % SOLUTION: Performed by: EMERGENCY MEDICINE

## 2018-11-06 RX ADMIN — IOPAMIDOL 100 ML: 612 INJECTION, SOLUTION INTRAVENOUS at 11:41

## 2019-05-08 ENCOUNTER — OFFICE VISIT (OUTPATIENT)
Dept: CARDIOLOGY | Facility: CLINIC | Age: 84
End: 2019-05-08

## 2019-05-08 VITALS
WEIGHT: 156 LBS | SYSTOLIC BLOOD PRESSURE: 100 MMHG | HEIGHT: 59 IN | HEART RATE: 55 BPM | BODY MASS INDEX: 31.45 KG/M2 | DIASTOLIC BLOOD PRESSURE: 70 MMHG

## 2019-05-08 DIAGNOSIS — I10 ESSENTIAL HYPERTENSION: ICD-10-CM

## 2019-05-08 DIAGNOSIS — I47.1 ATRIAL TACHYCARDIA (HCC): Primary | ICD-10-CM

## 2019-05-08 DIAGNOSIS — E78.2 MIXED HYPERLIPIDEMIA: ICD-10-CM

## 2019-05-08 PROCEDURE — 93000 ELECTROCARDIOGRAM COMPLETE: CPT | Performed by: INTERNAL MEDICINE

## 2019-05-08 PROCEDURE — 99213 OFFICE O/P EST LOW 20 MIN: CPT | Performed by: INTERNAL MEDICINE

## 2019-05-08 RX ORDER — MELATONIN
1 DAILY
COMMUNITY

## 2019-05-08 NOTE — PROGRESS NOTES
Priya Mobley is a 89 y.o. female. Fu of rhythm    History of Present Illness     PAT:  Has not had any palpitations since LOV. Is compliant with meds that are well tolerated. Has good stamina and no cp or unusual sob. EKG today shows a slower HR and a decline int ant RF's.    HTN:  Gets good clinic checks on current meds and no light-headedness. ECHO 7/18 showed some mild LVH and mild pulm HTN.    HLD:  Is off her statin at the rec of her pcp now.      The following portions of the patient's history were reviewed and updated as appropriate: allergies, current medications, past family history, past medical history, past social history, past surgical history and problem list.    Patient Active Problem List   Diagnosis   • Atrial tachycardia (CMS/HCC)   • Essential hypertension   • Mixed hyperlipidemia   • Palpitation   • Lung nodule   • Cardiomegaly   • Diabetes mellitus (CMS/HCC)       Allergies   Allergen Reactions   • Levaquin [Levofloxacin] Rash   • Rocephin [Ceftriaxone] Rash   • Vancomycin Rash       Family History   Problem Relation Age of Onset   • Cancer Mother    • Cancer Father    • Cancer Sister    • Cancer Brother    • Cancer Sister        Social History     Socioeconomic History   • Marital status:      Spouse name: Not on file   • Number of children: Not on file   • Years of education: Not on file   • Highest education level: Not on file   Tobacco Use   • Smoking status: Former Smoker   • Smokeless tobacco: Never Used   • Tobacco comment: quit 40 years ago   Substance and Sexual Activity   • Alcohol use: No   • Drug use: No         Current Outpatient Medications:   •  Cholecalciferol (VITAMIN D3 PO), Take  by mouth., Disp: , Rfl:   •  GLIMEPIRIDE PO, Take  by mouth., Disp: , Rfl:   •  levothyroxine (SYNTHROID) 100 MCG tablet, Take 1 tablet by mouth Daily., Disp: 30 tablet, Rfl: 2  •  metoprolol tartrate (LOPRESSOR) 25 MG tablet, Take 25 mg by mouth Daily., Disp: , Rfl:     Past  "Surgical History:   Procedure Laterality Date   • BREAST CYST EXCISION     • PERICARDIAL WINDOW         Review of Systems   Constitutional: Negative for fatigue, fever and unexpected weight change.   Respiratory: Negative for apnea, chest tightness and shortness of breath.    Cardiovascular: Negative for chest pain, palpitations and leg swelling.   Gastrointestinal: Negative for abdominal pain.   Genitourinary: Negative for dysuria.   Musculoskeletal: Positive for arthralgias. Negative for myalgias.   Neurological: Negative for weakness and light-headedness.   Psychiatric/Behavioral: Negative for sleep disturbance.       /70   Pulse 55   Ht 149.9 cm (59\")   Wt 70.8 kg (156 lb)   BMI 31.51 kg/m²   Procedures    Objective   Physical Exam   Constitutional: She is oriented to person, place, and time. She appears well-developed and well-nourished. No distress.   HENT:   Head: Normocephalic.   Eyes: Pupils are equal, round, and reactive to light.   Neck: No thyromegaly present.   Cardiovascular: Normal rate, regular rhythm, normal heart sounds and intact distal pulses. Exam reveals no gallop and no friction rub.   No murmur heard.  Pulmonary/Chest: Effort normal. No stridor. No respiratory distress. She has no wheezes.   Mildly diminished bs bilat   Abdominal: Soft. Bowel sounds are normal. She exhibits no distension. There is no tenderness. There is no guarding.   Musculoskeletal: She exhibits no edema or tenderness.   Neurological: She is alert and oriented to person, place, and time.   Skin: Skin is warm and dry. She is not diaphoretic.   Psychiatric: She has a normal mood and affect.       Assessment/Plan   Rashard was seen today for atrial tachycardia, hypertension and hyperlipidemia.    Diagnoses and all orders for this visit:    Atrial tachycardia (CMS/HCC)  Comments:  now asymptomatic  Orders:  -     ECG 12 Lead    Essential hypertension  Comments:  controlled    Mixed hyperlipidemia  Comments:  followed " by pcp                 Return in about 1 year (around 5/8/2020) for Next scheduled follow up.  Orders Placed This Encounter   Procedures   • ECG 12 Lead     Order Specific Question:   Reason for Exam:     Answer:   [email or web address]

## 2020-01-01 ENCOUNTER — APPOINTMENT (OUTPATIENT)
Dept: GENERAL RADIOLOGY | Facility: HOSPITAL | Age: 85
End: 2020-01-01

## 2020-01-01 ENCOUNTER — APPOINTMENT (OUTPATIENT)
Dept: ULTRASOUND IMAGING | Facility: HOSPITAL | Age: 85
End: 2020-01-01

## 2020-01-01 ENCOUNTER — TRANSCRIBE ORDERS (OUTPATIENT)
Dept: ADMINISTRATIVE | Facility: HOSPITAL | Age: 85
End: 2020-01-01

## 2020-01-01 ENCOUNTER — APPOINTMENT (OUTPATIENT)
Dept: CT IMAGING | Facility: HOSPITAL | Age: 85
End: 2020-01-01

## 2020-01-01 ENCOUNTER — HOSPITAL ENCOUNTER (INPATIENT)
Facility: HOSPITAL | Age: 85
LOS: 23 days | End: 2020-12-07
Attending: EMERGENCY MEDICINE | Admitting: FAMILY MEDICINE

## 2020-01-01 ENCOUNTER — APPOINTMENT (OUTPATIENT)
Dept: CARDIOLOGY | Facility: HOSPITAL | Age: 85
End: 2020-01-01

## 2020-01-01 ENCOUNTER — OFFICE VISIT (OUTPATIENT)
Dept: CARDIOLOGY | Facility: CLINIC | Age: 85
End: 2020-01-01

## 2020-01-01 ENCOUNTER — LAB (OUTPATIENT)
Dept: LAB | Facility: HOSPITAL | Age: 85
End: 2020-01-01

## 2020-01-01 ENCOUNTER — HOSPITAL ENCOUNTER (EMERGENCY)
Facility: HOSPITAL | Age: 85
Discharge: HOME | End: 2020-05-25
Admitting: FAMILY MEDICINE

## 2020-01-01 VITALS
RESPIRATION RATE: 15 BRPM | HEIGHT: 59 IN | BODY MASS INDEX: 33.18 KG/M2 | TEMPERATURE: 98 F | SYSTOLIC BLOOD PRESSURE: 120 MMHG | DIASTOLIC BLOOD PRESSURE: 64 MMHG | WEIGHT: 164.6 LBS | HEART RATE: 74 BPM | OXYGEN SATURATION: 100 %

## 2020-01-01 VITALS
DIASTOLIC BLOOD PRESSURE: 71 MMHG | HEART RATE: 70 BPM | WEIGHT: 149 LBS | SYSTOLIC BLOOD PRESSURE: 121 MMHG | HEIGHT: 59 IN | BODY MASS INDEX: 30.04 KG/M2

## 2020-01-01 VITALS
TEMPERATURE: 97.3 F | BODY MASS INDEX: 32.89 KG/M2 | DIASTOLIC BLOOD PRESSURE: 53 MMHG | WEIGHT: 167.55 LBS | HEIGHT: 60 IN | HEART RATE: 60 BPM | OXYGEN SATURATION: 95 % | RESPIRATION RATE: 20 BRPM | SYSTOLIC BLOOD PRESSURE: 104 MMHG

## 2020-01-01 DIAGNOSIS — Z20.822 ENCOUNTER FOR SCREENING LABORATORY TESTING FOR COVID-19 VIRUS: ICD-10-CM

## 2020-01-01 DIAGNOSIS — J18.9 PNEUMONIA DUE TO INFECTIOUS ORGANISM, UNSPECIFIED LATERALITY, UNSPECIFIED PART OF LUNG: ICD-10-CM
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
## 2020-01-01 DIAGNOSIS — J12.82 PNEUMONIA DUE TO COVID-19 VIRUS: ICD-10-CM

## 2020-01-01 DIAGNOSIS — J96.01 ACUTE RESPIRATORY FAILURE WITH HYPOXIA (HCC): ICD-10-CM

## 2020-01-01 DIAGNOSIS — U07.1 COVID-19 VIRUS DETECTED: ICD-10-CM

## 2020-01-01 DIAGNOSIS — J18.0 BRONCHIAL PNEUMONIA: ICD-10-CM

## 2020-01-01 DIAGNOSIS — S20.211A CONTUSION OF RIB ON RIGHT SIDE, INITIAL ENCOUNTER: ICD-10-CM

## 2020-01-01 DIAGNOSIS — Z74.09 IMPAIRED MOBILITY: ICD-10-CM

## 2020-01-01 DIAGNOSIS — R09.02 HYPOXIC: ICD-10-CM

## 2020-01-01 DIAGNOSIS — Z01.818 PREOPERATIVE TESTING: Primary | ICD-10-CM

## 2020-01-01 DIAGNOSIS — U07.1 COVID-19 VIRUS INFECTION: Primary | ICD-10-CM

## 2020-01-01 DIAGNOSIS — I47.1 ATRIAL TACHYCARDIA (HCC): Primary | ICD-10-CM

## 2020-01-01 DIAGNOSIS — I48.91 ATRIAL FIBRILLATION WITH RVR (HCC): ICD-10-CM

## 2020-01-01 DIAGNOSIS — U07.1 PNEUMONIA DUE TO COVID-19 VIRUS: ICD-10-CM

## 2020-01-01 DIAGNOSIS — I10 ESSENTIAL HYPERTENSION: ICD-10-CM

## 2020-01-01 LAB
ABO GROUP BLD: NORMAL
ACANTHOCYTES BLD QL SMEAR: ABNORMAL
ACANTHOCYTES BLD QL SMEAR: ABNORMAL
ALBUMIN SERPL-MCNC: 2.3 G/DL (ref 3.5–5.2)
ALBUMIN SERPL-MCNC: 2.4 G/DL (ref 3.5–5.2)
ALBUMIN SERPL-MCNC: 2.5 G/DL (ref 3.5–5.2)
ALBUMIN SERPL-MCNC: 2.6 G/DL (ref 3.5–5.2)
ALBUMIN SERPL-MCNC: 2.8 G/DL (ref 3.5–5.2)
ALBUMIN SERPL-MCNC: 2.9 G/DL (ref 3.5–5.2)
ALBUMIN SERPL-MCNC: 3 G/DL (ref 3.5–5.2)
ALBUMIN SERPL-MCNC: 3 G/DL (ref 3.5–5.2)
ALBUMIN SERPL-MCNC: 3.1 G/DL (ref 3.5–5.2)
ALBUMIN SERPL-MCNC: 3.2 G/DL (ref 3.5–5.2)
ALBUMIN SERPL-MCNC: 3.3 G/DL (ref 3.5–5.2)
ALBUMIN SERPL-MCNC: 3.6 G/DL (ref 3.5–5.2)
ALBUMIN SERPL-MCNC: 3.6 G/DL (ref 3.5–5.2)
ALBUMIN SERPL-MCNC: 3.8 G/DL (ref 3.5–5.2)
ALBUMIN SERPL-MCNC: 3.9 G/DL (ref 3.5–5.2)
ALBUMIN/GLOB SERPL: 0.9 G/DL
ALBUMIN/GLOB SERPL: 1 G/DL
ALBUMIN/GLOB SERPL: 1.1 G/DL
ALBUMIN/GLOB SERPL: 1.2 G/DL
ALBUMIN/GLOB SERPL: 1.2 G/DL
ALBUMIN/GLOB SERPL: 1.3 G/DL
ALBUMIN/GLOB SERPL: 1.4 G/DL
ALBUMIN/GLOB SERPL: 1.8 G/DL
ALP SERPL-CCNC: 101 U/L (ref 39–117)
ALP SERPL-CCNC: 121 U/L (ref 39–117)
ALP SERPL-CCNC: 141 U/L (ref 39–117)
ALP SERPL-CCNC: 150 U/L (ref 39–117)
ALP SERPL-CCNC: 51 U/L (ref 39–117)
ALP SERPL-CCNC: 54 U/L (ref 39–117)
ALP SERPL-CCNC: 55 U/L (ref 39–117)
ALP SERPL-CCNC: 57 U/L (ref 39–117)
ALP SERPL-CCNC: 57 U/L (ref 39–117)
ALP SERPL-CCNC: 58 U/L (ref 39–117)
ALP SERPL-CCNC: 60 U/L (ref 39–117)
ALP SERPL-CCNC: 61 U/L (ref 39–117)
ALP SERPL-CCNC: 63 U/L (ref 39–117)
ALP SERPL-CCNC: 63 U/L (ref 39–117)
ALP SERPL-CCNC: 74 U/L (ref 39–117)
ALP SERPL-CCNC: 77 U/L (ref 39–117)
ALP SERPL-CCNC: 80 U/L (ref 39–117)
ALP SERPL-CCNC: 91 U/L (ref 39–117)
ALT SERPL W P-5'-P-CCNC: 15 U/L (ref 1–33)
ALT SERPL W P-5'-P-CCNC: 15 U/L (ref 1–33)
ALT SERPL W P-5'-P-CCNC: 17 U/L (ref 1–33)
ALT SERPL W P-5'-P-CCNC: 18 U/L (ref 1–33)
ALT SERPL W P-5'-P-CCNC: 20 U/L (ref 1–33)
ALT SERPL W P-5'-P-CCNC: 21 U/L (ref 1–33)
ALT SERPL W P-5'-P-CCNC: 22 U/L (ref 1–33)
ALT SERPL W P-5'-P-CCNC: 24 U/L (ref 1–33)
ALT SERPL W P-5'-P-CCNC: 26 U/L (ref 1–33)
ALT SERPL W P-5'-P-CCNC: 27 U/L (ref 1–33)
ALT SERPL W P-5'-P-CCNC: 28 U/L (ref 1–33)
ALT SERPL W P-5'-P-CCNC: 30 U/L (ref 1–33)
ALT SERPL W P-5'-P-CCNC: 32 U/L (ref 1–33)
ALT SERPL W P-5'-P-CCNC: 33 U/L (ref 1–33)
ALT SERPL W P-5'-P-CCNC: 37 U/L (ref 1–33)
ALT SERPL W P-5'-P-CCNC: 40 U/L (ref 1–33)
ANION GAP SERPL CALCULATED.3IONS-SCNC: 10 MMOL/L (ref 5–15)
ANION GAP SERPL CALCULATED.3IONS-SCNC: 11 MMOL/L (ref 5–15)
ANION GAP SERPL CALCULATED.3IONS-SCNC: 12 MMOL/L (ref 5–15)
ANION GAP SERPL CALCULATED.3IONS-SCNC: 13 MMOL/L (ref 5–15)
ANION GAP SERPL CALCULATED.3IONS-SCNC: 14 MMOL/L (ref 5–15)
ANION GAP SERPL CALCULATED.3IONS-SCNC: 17 MMOL/L (ref 5–15)
ANION GAP SERPL CALCULATED.3IONS-SCNC: 17 MMOL/L (ref 5–15)
ANION GAP SERPL CALCULATED.3IONS-SCNC: 22 MMOL/L (ref 5–15)
ANION GAP SERPL CALCULATED.3IONS-SCNC: 4 MMOL/L (ref 5–15)
ANION GAP SERPL CALCULATED.3IONS-SCNC: 4 MMOL/L (ref 5–15)
ANION GAP SERPL CALCULATED.3IONS-SCNC: 5 MMOL/L (ref 5–15)
ANION GAP SERPL CALCULATED.3IONS-SCNC: 6 MMOL/L (ref 5–15)
ANION GAP SERPL CALCULATED.3IONS-SCNC: 6 MMOL/L (ref 5–15)
ANION GAP SERPL CALCULATED.3IONS-SCNC: 7 MMOL/L (ref 5–15)
ANION GAP SERPL CALCULATED.3IONS-SCNC: 8 MMOL/L (ref 5–15)
ANISOCYTOSIS BLD QL: ABNORMAL
APTT PPP: 29.4 SECONDS (ref 24.1–35)
APTT PPP: 34 SECONDS (ref 24.1–35)
ARTERIAL PATENCY WRIST A: ABNORMAL
ARTERIAL PATENCY WRIST A: POSITIVE
AST SERPL-CCNC: 12 U/L (ref 1–32)
AST SERPL-CCNC: 15 U/L (ref 1–32)
AST SERPL-CCNC: 18 U/L (ref 1–32)
AST SERPL-CCNC: 19 U/L (ref 1–32)
AST SERPL-CCNC: 21 U/L (ref 1–32)
AST SERPL-CCNC: 23 U/L (ref 1–32)
AST SERPL-CCNC: 23 U/L (ref 1–32)
AST SERPL-CCNC: 24 U/L (ref 1–32)
AST SERPL-CCNC: 25 U/L (ref 1–32)
AST SERPL-CCNC: 26 U/L (ref 1–32)
AST SERPL-CCNC: 30 U/L (ref 1–32)
AST SERPL-CCNC: 32 U/L (ref 1–32)
AST SERPL-CCNC: 33 U/L (ref 1–32)
AST SERPL-CCNC: 49 U/L (ref 1–32)
ATMOSPHERIC PRESS: 746 MMHG
ATMOSPHERIC PRESS: 748 MMHG
ATMOSPHERIC PRESS: 748 MMHG
ATMOSPHERIC PRESS: 749 MMHG
ATMOSPHERIC PRESS: 749 MMHG
ATMOSPHERIC PRESS: 750 MMHG
ATMOSPHERIC PRESS: 751 MMHG
ATMOSPHERIC PRESS: 752 MMHG
ATMOSPHERIC PRESS: 759 MMHG
ATMOSPHERIC PRESS: 761 MMHG
BACTERIA BLD CULT: ABNORMAL
BACTERIA SPEC AEROBE CULT: ABNORMAL
BACTERIA SPEC AEROBE CULT: NORMAL
BACTERIA UR QL AUTO: ABNORMAL /HPF
BASE EXCESS BLDA CALC-SCNC: -0.1 MMOL/L (ref 0–2)
BASE EXCESS BLDA CALC-SCNC: -0.6 MMOL/L (ref 0–2)
BASE EXCESS BLDA CALC-SCNC: -2.3 MMOL/L (ref 0–2)
BASE EXCESS BLDA CALC-SCNC: -4.1 MMOL/L (ref 0–2)
BASE EXCESS BLDA CALC-SCNC: 1.3 MMOL/L (ref 0–2)
BASE EXCESS BLDA CALC-SCNC: 1.7 MMOL/L (ref 0–2)
BASE EXCESS BLDA CALC-SCNC: 2.3 MMOL/L (ref 0–2)
BASE EXCESS BLDA CALC-SCNC: 2.5 MMOL/L (ref 0–2)
BASE EXCESS BLDA CALC-SCNC: 2.8 MMOL/L (ref 0–2)
BASE EXCESS BLDA CALC-SCNC: 3.3 MMOL/L (ref 0–2)
BASO STIPL COARSE BLD QL SMEAR: ABNORMAL
BASO STIPL COARSE BLD QL SMEAR: ABNORMAL
BASOPHILS # BLD AUTO: 0.03 10*3/MM3 (ref 0–0.2)
BASOPHILS # BLD AUTO: 0.05 10*3/MM3 (ref 0–0.2)
BASOPHILS # BLD AUTO: 0.07 10*3/MM3 (ref 0–0.2)
BASOPHILS # BLD AUTO: 0.07 10*3/MM3 (ref 0–0.2)
BASOPHILS # BLD AUTO: 0.09 10*3/MM3 (ref 0–0.2)
BASOPHILS # BLD AUTO: 0.11 10*3/MM3 (ref 0–0.2)
BASOPHILS # BLD AUTO: 0.16 10*3/MM3 (ref 0–0.2)
BASOPHILS NFR BLD AUTO: 0.3 % (ref 0–1.5)
BASOPHILS NFR BLD AUTO: 0.4 % (ref 0–1.5)
BASOPHILS NFR BLD AUTO: 0.5 % (ref 0–1.5)
BASOPHILS NFR BLD AUTO: 1 % (ref 0–1.5)
BDY SITE: ABNORMAL
BH BB BLOOD EXPIRATION DATE: NORMAL
BH BB BLOOD TYPE BARCODE: 5100
BH BB DISPENSE STATUS: NORMAL
BH BB PRODUCT CODE: NORMAL
BH BB UNIT NUMBER: NORMAL
BH CV ECHO MEAS - AO MAX PG (FULL): 3.8 MMHG
BH CV ECHO MEAS - AO MAX PG: 6.3 MMHG
BH CV ECHO MEAS - AO MEAN PG (FULL): 3 MMHG
BH CV ECHO MEAS - AO MEAN PG: 4 MMHG
BH CV ECHO MEAS - AO ROOT AREA (BSA CORRECTED): 2.2
BH CV ECHO MEAS - AO ROOT AREA: 10.2 CM^2
BH CV ECHO MEAS - AO ROOT DIAM: 3.6 CM
BH CV ECHO MEAS - AO V2 MAX: 125 CM/SEC
BH CV ECHO MEAS - AO V2 MEAN: 95.6 CM/SEC
BH CV ECHO MEAS - AO V2 VTI: 36.6 CM
BH CV ECHO MEAS - AVA(I,A): 1.7 CM^2
BH CV ECHO MEAS - AVA(I,D): 1.7 CM^2
BH CV ECHO MEAS - AVA(V,A): 2 CM^2
BH CV ECHO MEAS - AVA(V,D): 2 CM^2
BH CV ECHO MEAS - BSA(HAYCOCK): 1.7 M^2
BH CV ECHO MEAS - BSA: 1.6 M^2
BH CV ECHO MEAS - BZI_BMI: 28.3 KILOGRAMS/M^2
BH CV ECHO MEAS - BZI_METRIC_HEIGHT: 152.4 CM
BH CV ECHO MEAS - BZI_METRIC_WEIGHT: 65.8 KG
BH CV ECHO MEAS - EDV(CUBED): 74.6 ML
BH CV ECHO MEAS - EDV(MOD-SP4): 36.3 ML
BH CV ECHO MEAS - EDV(TEICH): 79 ML
BH CV ECHO MEAS - EF(CUBED): 68 %
BH CV ECHO MEAS - EF(MOD-SP4): 66.1 %
BH CV ECHO MEAS - EF(TEICH): 59.9 %
BH CV ECHO MEAS - ESV(CUBED): 23.9 ML
BH CV ECHO MEAS - ESV(MOD-SP4): 12.3 ML
BH CV ECHO MEAS - ESV(TEICH): 31.7 ML
BH CV ECHO MEAS - FS: 31.6 %
BH CV ECHO MEAS - IVS/LVPW: 0.81
BH CV ECHO MEAS - IVSD: 0.79 CM
BH CV ECHO MEAS - LA DIMENSION: 3.9 CM
BH CV ECHO MEAS - LA/AO: 1.1
BH CV ECHO MEAS - LV DIASTOLIC VOL/BSA (35-75): 22.3 ML/M^2
BH CV ECHO MEAS - LV MASS(C)D: 115.1 GRAMS
BH CV ECHO MEAS - LV MASS(C)DI: 70.7 GRAMS/M^2
BH CV ECHO MEAS - LV MAX PG: 2.5 MMHG
BH CV ECHO MEAS - LV MEAN PG: 1 MMHG
BH CV ECHO MEAS - LV SYSTOLIC VOL/BSA (12-30): 7.6 ML/M^2
BH CV ECHO MEAS - LV V1 MAX: 78.3 CM/SEC
BH CV ECHO MEAS - LV V1 MEAN: 56.1 CM/SEC
BH CV ECHO MEAS - LV V1 VTI: 19.7 CM
BH CV ECHO MEAS - LVIDD: 4.2 CM
BH CV ECHO MEAS - LVIDS: 2.9 CM
BH CV ECHO MEAS - LVLD AP4: 6.1 CM
BH CV ECHO MEAS - LVLS AP4: 4.3 CM
BH CV ECHO MEAS - LVOT AREA (M): 3.1 CM^2
BH CV ECHO MEAS - LVOT AREA: 3.1 CM^2
BH CV ECHO MEAS - LVOT DIAM: 2 CM
BH CV ECHO MEAS - LVPWD: 0.97 CM
BH CV ECHO MEAS - MR MAX PG: 73.6 MMHG
BH CV ECHO MEAS - MR MAX VEL: 429 CM/SEC
BH CV ECHO MEAS - MR MEAN PG: 57 MMHG
BH CV ECHO MEAS - MR MEAN VEL: 367 CM/SEC
BH CV ECHO MEAS - MR VTI: 145 CM
BH CV ECHO MEAS - MV A MAX VEL: 74.7 CM/SEC
BH CV ECHO MEAS - MV DEC SLOPE: 209 CM/SEC^2
BH CV ECHO MEAS - MV DEC TIME: 0.29 SEC
BH CV ECHO MEAS - MV E MAX VEL: 61.1 CM/SEC
BH CV ECHO MEAS - MV E/A: 0.82
BH CV ECHO MEAS - RAP SYSTOLE: 10 MMHG
BH CV ECHO MEAS - RVSP: 29.5 MMHG
BH CV ECHO MEAS - SI(AO): 228.8 ML/M^2
BH CV ECHO MEAS - SI(CUBED): 31.2 ML/M^2
BH CV ECHO MEAS - SI(LVOT): 38 ML/M^2
BH CV ECHO MEAS - SI(MOD-SP4): 14.7 ML/M^2
BH CV ECHO MEAS - SI(TEICH): 29.1 ML/M^2
BH CV ECHO MEAS - SV(AO): 372.5 ML
BH CV ECHO MEAS - SV(CUBED): 50.7 ML
BH CV ECHO MEAS - SV(LVOT): 61.9 ML
BH CV ECHO MEAS - SV(MOD-SP4): 24 ML
BH CV ECHO MEAS - SV(TEICH): 47.4 ML
BH CV ECHO MEAS - TR MAX VEL: 221 CM/SEC
BILIRUB CONJ SERPL-MCNC: 0.4 MG/DL (ref 0–0.3)
BILIRUB CONJ SERPL-MCNC: 0.4 MG/DL (ref 0–0.3)
BILIRUB SERPL-MCNC: 0.4 MG/DL (ref 0–1.2)
BILIRUB SERPL-MCNC: 0.5 MG/DL (ref 0–1.2)
BILIRUB SERPL-MCNC: 0.6 MG/DL (ref 0–1.2)
BILIRUB SERPL-MCNC: 0.7 MG/DL (ref 0–1.2)
BILIRUB SERPL-MCNC: 0.7 MG/DL (ref 0–1.2)
BILIRUB SERPL-MCNC: 0.8 MG/DL (ref 0–1.2)
BILIRUB SERPL-MCNC: 0.8 MG/DL (ref 0–1.2)
BILIRUB SERPL-MCNC: 0.9 MG/DL (ref 0–1.2)
BILIRUB SERPL-MCNC: 0.9 MG/DL (ref 0–1.2)
BILIRUB SERPL-MCNC: 1 MG/DL (ref 0–1.2)
BILIRUB SERPL-MCNC: 1.1 MG/DL (ref 0–1.2)
BILIRUB UR QL STRIP: NEGATIVE
BLD GP AB SCN SERPL QL: NEGATIVE
BODY TEMPERATURE: 37 C
BOTTLE TYPE: ABNORMAL
BUN SERPL-MCNC: 17 MG/DL (ref 8–23)
BUN SERPL-MCNC: 22 MG/DL (ref 8–23)
BUN SERPL-MCNC: 23 MG/DL (ref 8–23)
BUN SERPL-MCNC: 26 MG/DL (ref 8–23)
BUN SERPL-MCNC: 28 MG/DL (ref 8–23)
BUN SERPL-MCNC: 45 MG/DL (ref 8–23)
BUN SERPL-MCNC: 46 MG/DL (ref 8–23)
BUN SERPL-MCNC: 48 MG/DL (ref 8–23)
BUN SERPL-MCNC: 49 MG/DL (ref 8–23)
BUN SERPL-MCNC: 49 MG/DL (ref 8–23)
BUN SERPL-MCNC: 50 MG/DL (ref 8–23)
BUN SERPL-MCNC: 50 MG/DL (ref 8–23)
BUN SERPL-MCNC: 52 MG/DL (ref 8–23)
BUN SERPL-MCNC: 54 MG/DL (ref 8–23)
BUN SERPL-MCNC: 56 MG/DL (ref 8–23)
BUN SERPL-MCNC: 57 MG/DL (ref 8–23)
BUN SERPL-MCNC: 57 MG/DL (ref 8–23)
BUN SERPL-MCNC: 58 MG/DL (ref 8–23)
BUN SERPL-MCNC: 58 MG/DL (ref 8–23)
BUN SERPL-MCNC: 60 MG/DL (ref 8–23)
BUN SERPL-MCNC: 62 MG/DL (ref 8–23)
BUN/CREAT SERPL: 19.8 (ref 7–25)
BUN/CREAT SERPL: 28.3 (ref 7–25)
BUN/CREAT SERPL: 32.4 (ref 7–25)
BUN/CREAT SERPL: 34.4 (ref 7–25)
BUN/CREAT SERPL: 35.9 (ref 7–25)
BUN/CREAT SERPL: 46.7 (ref 7–25)
BUN/CREAT SERPL: 58.4 (ref 7–25)
BUN/CREAT SERPL: 62.6 (ref 7–25)
BUN/CREAT SERPL: 68.5 (ref 7–25)
BUN/CREAT SERPL: 69.5 (ref 7–25)
BUN/CREAT SERPL: 70 (ref 7–25)
BUN/CREAT SERPL: 72 (ref 7–25)
BUN/CREAT SERPL: 75 (ref 7–25)
BUN/CREAT SERPL: 76.9 (ref 7–25)
BUN/CREAT SERPL: 77.5 (ref 7–25)
BUN/CREAT SERPL: 77.6 (ref 7–25)
BUN/CREAT SERPL: 84.5 (ref 7–25)
BUN/CREAT SERPL: 86.6 (ref 7–25)
BUN/CREAT SERPL: 88.5 (ref 7–25)
BUN/CREAT SERPL: 90.6 (ref 7–25)
BUN/CREAT SERPL: 94.2 (ref 7–25)
BURR CELLS BLD QL SMEAR: ABNORMAL
BURR CELLS BLD QL SMEAR: ABNORMAL
CALCIUM SPEC-SCNC: 8.6 MG/DL (ref 8.2–9.6)
CALCIUM SPEC-SCNC: 8.7 MG/DL (ref 8.2–9.6)
CALCIUM SPEC-SCNC: 8.8 MG/DL (ref 8.2–9.6)
CALCIUM SPEC-SCNC: 8.9 MG/DL (ref 8.2–9.6)
CALCIUM SPEC-SCNC: 9 MG/DL (ref 8.2–9.6)
CALCIUM SPEC-SCNC: 9 MG/DL (ref 8.2–9.6)
CALCIUM SPEC-SCNC: 9.1 MG/DL (ref 8.2–9.6)
CALCIUM SPEC-SCNC: 9.2 MG/DL (ref 8.2–9.6)
CALCIUM SPEC-SCNC: 9.3 MG/DL (ref 8.2–9.6)
CALCIUM SPEC-SCNC: 9.3 MG/DL (ref 8.2–9.6)
CALCIUM SPEC-SCNC: 9.5 MG/DL (ref 8.2–9.6)
CALCIUM SPEC-SCNC: 9.6 MG/DL (ref 8.2–9.6)
CALCIUM SPEC-SCNC: 9.7 MG/DL (ref 8.2–9.6)
CALCIUM SPEC-SCNC: 9.7 MG/DL (ref 8.2–9.6)
CHLORIDE SERPL-SCNC: 100 MMOL/L (ref 98–107)
CHLORIDE SERPL-SCNC: 100 MMOL/L (ref 98–107)
CHLORIDE SERPL-SCNC: 101 MMOL/L (ref 98–107)
CHLORIDE SERPL-SCNC: 103 MMOL/L (ref 98–107)
CHLORIDE SERPL-SCNC: 103 MMOL/L (ref 98–107)
CHLORIDE SERPL-SCNC: 105 MMOL/L (ref 98–107)
CHLORIDE SERPL-SCNC: 105 MMOL/L (ref 98–107)
CHLORIDE SERPL-SCNC: 107 MMOL/L (ref 98–107)
CHLORIDE SERPL-SCNC: 108 MMOL/L (ref 98–107)
CHLORIDE SERPL-SCNC: 109 MMOL/L (ref 98–107)
CHLORIDE SERPL-SCNC: 95 MMOL/L (ref 98–107)
CHLORIDE SERPL-SCNC: 96 MMOL/L (ref 98–107)
CHLORIDE SERPL-SCNC: 97 MMOL/L (ref 98–107)
CHLORIDE SERPL-SCNC: 98 MMOL/L (ref 98–107)
CHLORIDE SERPL-SCNC: 99 MMOL/L (ref 98–107)
CHOLEST SERPL-MCNC: 215 MG/DL (ref 0–200)
CK SERPL-CCNC: 169 U/L (ref 20–180)
CK SERPL-CCNC: 18 U/L (ref 20–180)
CK SERPL-CCNC: 19 U/L (ref 20–180)
CK SERPL-CCNC: 32 U/L (ref 20–180)
CK SERPL-CCNC: 322 U/L (ref 20–180)
CK SERPL-CCNC: 332 U/L (ref 20–180)
CK SERPL-CCNC: 40 U/L (ref 20–180)
CK SERPL-CCNC: 50 U/L (ref 20–180)
CK SERPL-CCNC: 52 U/L (ref 20–180)
CK SERPL-CCNC: 52 U/L (ref 20–180)
CK SERPL-CCNC: 563 U/L (ref 20–180)
CK SERPL-CCNC: 61 U/L (ref 20–180)
CK SERPL-CCNC: 73 U/L (ref 20–180)
CK SERPL-CCNC: 75 U/L (ref 20–180)
CLARITY UR: ABNORMAL
CLARITY UR: CLEAR
CLUMPED PLATELETS: PRESENT
CO2 SERPL-SCNC: 20 MMOL/L (ref 22–29)
CO2 SERPL-SCNC: 21 MMOL/L (ref 22–29)
CO2 SERPL-SCNC: 23 MMOL/L (ref 22–29)
CO2 SERPL-SCNC: 24 MMOL/L (ref 22–29)
CO2 SERPL-SCNC: 24 MMOL/L (ref 22–29)
CO2 SERPL-SCNC: 25 MMOL/L (ref 22–29)
CO2 SERPL-SCNC: 25 MMOL/L (ref 22–29)
CO2 SERPL-SCNC: 27 MMOL/L (ref 22–29)
CO2 SERPL-SCNC: 27 MMOL/L (ref 22–29)
CO2 SERPL-SCNC: 28 MMOL/L (ref 22–29)
CO2 SERPL-SCNC: 29 MMOL/L (ref 22–29)
CO2 SERPL-SCNC: 29 MMOL/L (ref 22–29)
CO2 SERPL-SCNC: 30 MMOL/L (ref 22–29)
CO2 SERPL-SCNC: 30 MMOL/L (ref 22–29)
COLOR UR: ABNORMAL
COLOR UR: YELLOW
CREAT SERPL-MCNC: 0.52 MG/DL (ref 0.57–1)
CREAT SERPL-MCNC: 0.52 MG/DL (ref 0.57–1)
CREAT SERPL-MCNC: 0.58 MG/DL (ref 0.57–1)
CREAT SERPL-MCNC: 0.58 MG/DL (ref 0.57–1)
CREAT SERPL-MCNC: 0.64 MG/DL (ref 0.57–1)
CREAT SERPL-MCNC: 0.67 MG/DL (ref 0.57–1)
CREAT SERPL-MCNC: 0.71 MG/DL (ref 0.57–1)
CREAT SERPL-MCNC: 0.73 MG/DL (ref 0.57–1)
CREAT SERPL-MCNC: 0.75 MG/DL (ref 0.57–1)
CREAT SERPL-MCNC: 0.78 MG/DL (ref 0.57–1)
CREAT SERPL-MCNC: 0.78 MG/DL (ref 0.57–1)
CREAT SERPL-MCNC: 0.8 MG/DL (ref 0.57–1)
CREAT SERPL-MCNC: 0.8 MG/DL (ref 0.57–1)
CREAT SERPL-MCNC: 0.82 MG/DL (ref 0.57–1)
CREAT SERPL-MCNC: 0.86 MG/DL (ref 0.57–1)
CREAT SERPL-MCNC: 0.89 MG/DL (ref 0.57–1)
CREAT SERPL-MCNC: 0.91 MG/DL (ref 0.57–1)
CREAT SERPL-MCNC: 0.92 MG/DL (ref 0.57–1)
CREAT SERPL-MCNC: 1.07 MG/DL (ref 0.57–1)
CROSSMATCH INTERPRETATION: NORMAL
CROSSMATCH INTERPRETATION: NORMAL
CRP SERPL-MCNC: 11.01 MG/DL (ref 0–0.5)
CRP SERPL-MCNC: 11.27 MG/DL (ref 0–0.5)
CRP SERPL-MCNC: 12.32 MG/DL (ref 0–0.5)
CRP SERPL-MCNC: 13.08 MG/DL (ref 0–0.5)
CRP SERPL-MCNC: 13.32 MG/DL (ref 0–0.5)
CRP SERPL-MCNC: 14.89 MG/DL (ref 0–0.5)
CRP SERPL-MCNC: 3.29 MG/DL (ref 0–0.5)
CRP SERPL-MCNC: 4.99 MG/DL (ref 0–0.5)
CRP SERPL-MCNC: 6.45 MG/DL (ref 0–0.5)
CRP SERPL-MCNC: 6.61 MG/DL (ref 0–0.5)
CRP SERPL-MCNC: 7.01 MG/DL (ref 0–0.5)
CRP SERPL-MCNC: 7.65 MG/DL (ref 0–0.5)
CRP SERPL-MCNC: 8.09 MG/DL (ref 0–0.5)
CRP SERPL-MCNC: 8.58 MG/DL (ref 0–0.5)
CRP SERPL-MCNC: 9.86 MG/DL (ref 0–0.5)
CRP SERPL-MCNC: 9.98 MG/DL (ref 0–0.5)
CYTOLOGIST CVX/VAG CYTO: NORMAL
D DIMER PPP FEU-MCNC: 0.26 MG/L (FEU) (ref 0–0.5)
D DIMER PPP FEU-MCNC: 0.29 MG/L (FEU) (ref 0–0.5)
D DIMER PPP FEU-MCNC: 0.41 MG/L (FEU) (ref 0–0.5)
D DIMER PPP FEU-MCNC: 0.82 MG/L (FEU) (ref 0–0.5)
D DIMER PPP FEU-MCNC: 0.88 MG/L (FEU) (ref 0–0.5)
D DIMER PPP FEU-MCNC: 0.9 MG/L (FEU) (ref 0–0.5)
D DIMER PPP FEU-MCNC: 6.67 MG/L (FEU) (ref 0–0.5)
D-LACTATE SERPL-SCNC: 1.4 MMOL/L (ref 0.5–2)
DACRYOCYTES BLD QL SMEAR: ABNORMAL
DEPRECATED RDW RBC AUTO: 49.7 FL (ref 37–54)
DEPRECATED RDW RBC AUTO: 50 FL (ref 37–54)
DEPRECATED RDW RBC AUTO: 50.8 FL (ref 37–54)
DEPRECATED RDW RBC AUTO: 51.8 FL (ref 37–54)
DEPRECATED RDW RBC AUTO: 51.9 FL (ref 37–54)
DEPRECATED RDW RBC AUTO: 51.9 FL (ref 37–54)
DEPRECATED RDW RBC AUTO: 52.2 FL (ref 37–54)
DEPRECATED RDW RBC AUTO: 52.2 FL (ref 37–54)
DEPRECATED RDW RBC AUTO: 52.4 FL (ref 37–54)
DEPRECATED RDW RBC AUTO: 52.6 FL (ref 37–54)
DEPRECATED RDW RBC AUTO: 52.8 FL (ref 37–54)
DEPRECATED RDW RBC AUTO: 52.8 FL (ref 37–54)
DEPRECATED RDW RBC AUTO: 52.9 FL (ref 37–54)
DEPRECATED RDW RBC AUTO: 54.3 FL (ref 37–54)
DEPRECATED RDW RBC AUTO: 54.6 FL (ref 37–54)
DEPRECATED RDW RBC AUTO: 54.7 FL (ref 37–54)
DEPRECATED RDW RBC AUTO: 56.9 FL (ref 37–54)
DEPRECATED RDW RBC AUTO: 65.4 FL (ref 37–54)
DEPRECATED RDW RBC AUTO: 69.7 FL (ref 37–54)
DEPRECATED RDW RBC AUTO: 70.5 FL (ref 37–54)
DEPRECATED RDW RBC AUTO: 71 FL (ref 37–54)
ELLIPTOCYTES BLD QL SMEAR: ABNORMAL
EOSINOPHIL # BLD AUTO: 0 10*3/MM3 (ref 0–0.4)
EOSINOPHIL # BLD AUTO: 0.01 10*3/MM3 (ref 0–0.4)
EOSINOPHIL # BLD AUTO: 0.02 10*3/MM3 (ref 0–0.4)
EOSINOPHIL # BLD AUTO: 0.05 10*3/MM3 (ref 0–0.4)
EOSINOPHIL # BLD AUTO: 0.08 10*3/MM3 (ref 0–0.4)
EOSINOPHIL # BLD MANUAL: 0.25 10*3/MM3 (ref 0–0.4)
EOSINOPHIL NFR BLD AUTO: 0 % (ref 0.3–6.2)
EOSINOPHIL NFR BLD AUTO: 0.1 % (ref 0.3–6.2)
EOSINOPHIL NFR BLD AUTO: 0.2 % (ref 0.3–6.2)
EOSINOPHIL NFR BLD AUTO: 0.2 % (ref 0.3–6.2)
EOSINOPHIL NFR BLD AUTO: 0.4 % (ref 0.3–6.2)
EOSINOPHIL NFR BLD MANUAL: 1 % (ref 0.3–6.2)
EPAP: 8
ERYTHROCYTE [DISTWIDTH] IN BLOOD BY AUTOMATED COUNT: 14.5 % (ref 12.3–15.4)
ERYTHROCYTE [DISTWIDTH] IN BLOOD BY AUTOMATED COUNT: 14.5 % (ref 12.3–15.4)
ERYTHROCYTE [DISTWIDTH] IN BLOOD BY AUTOMATED COUNT: 14.6 % (ref 12.3–15.4)
ERYTHROCYTE [DISTWIDTH] IN BLOOD BY AUTOMATED COUNT: 14.7 % (ref 12.3–15.4)
ERYTHROCYTE [DISTWIDTH] IN BLOOD BY AUTOMATED COUNT: 14.7 % (ref 12.3–15.4)
ERYTHROCYTE [DISTWIDTH] IN BLOOD BY AUTOMATED COUNT: 14.9 % (ref 12.3–15.4)
ERYTHROCYTE [DISTWIDTH] IN BLOOD BY AUTOMATED COUNT: 14.9 % (ref 12.3–15.4)
ERYTHROCYTE [DISTWIDTH] IN BLOOD BY AUTOMATED COUNT: 15.2 % (ref 12.3–15.4)
ERYTHROCYTE [DISTWIDTH] IN BLOOD BY AUTOMATED COUNT: 15.3 % (ref 12.3–15.4)
ERYTHROCYTE [DISTWIDTH] IN BLOOD BY AUTOMATED COUNT: 15.3 % (ref 12.3–15.4)
ERYTHROCYTE [DISTWIDTH] IN BLOOD BY AUTOMATED COUNT: 15.4 % (ref 12.3–15.4)
ERYTHROCYTE [DISTWIDTH] IN BLOOD BY AUTOMATED COUNT: 15.5 % (ref 12.3–15.4)
ERYTHROCYTE [DISTWIDTH] IN BLOOD BY AUTOMATED COUNT: 19.1 % (ref 12.3–15.4)
ERYTHROCYTE [DISTWIDTH] IN BLOOD BY AUTOMATED COUNT: 19.9 % (ref 12.3–15.4)
ERYTHROCYTE [DISTWIDTH] IN BLOOD BY AUTOMATED COUNT: 20.6 % (ref 12.3–15.4)
ERYTHROCYTE [DISTWIDTH] IN BLOOD BY AUTOMATED COUNT: 20.8 % (ref 12.3–15.4)
FERRITIN SERPL-MCNC: 1096 NG/ML (ref 13–150)
FERRITIN SERPL-MCNC: 1132 NG/ML (ref 13–150)
FERRITIN SERPL-MCNC: 1137 NG/ML (ref 13–150)
FERRITIN SERPL-MCNC: 1527 NG/ML (ref 13–150)
FERRITIN SERPL-MCNC: 1710 NG/ML (ref 13–150)
FERRITIN SERPL-MCNC: 1817 NG/ML (ref 13–150)
FERRITIN SERPL-MCNC: 1951 NG/ML (ref 13–150)
FERRITIN SERPL-MCNC: 2018 NG/ML (ref 13–150)
FERRITIN SERPL-MCNC: 2046 NG/ML (ref 13–150)
FERRITIN SERPL-MCNC: 2108 NG/ML (ref 13–150)
FERRITIN SERPL-MCNC: 2194 NG/ML (ref 13–150)
FERRITIN SERPL-MCNC: 2248 NG/ML (ref 13–150)
FERRITIN SERPL-MCNC: 2347 NG/ML (ref 13–150)
FERRITIN SERPL-MCNC: 2432 NG/ML (ref 13–150)
FERRITIN SERPL-MCNC: 2518 NG/ML (ref 13–150)
FERRITIN SERPL-MCNC: 375.7 NG/ML (ref 13–150)
FERRITIN SERPL-MCNC: 4629 NG/ML (ref 13–150)
FIBRINOGEN PPP-MCNC: 420 MG/DL (ref 240–460)
FIBRINOGEN PPP-MCNC: 481 MG/DL (ref 240–460)
FIBRINOGEN PPP-MCNC: 567 MG/DL (ref 240–460)
FIBRINOGEN PPP-MCNC: 568 MG/DL (ref 240–460)
FIBRINOGEN PPP-MCNC: 588 MG/DL (ref 240–460)
FIBRINOGEN PPP-MCNC: 677 MG/DL (ref 240–460)
GAS FLOW AIRWAY: 30 LPM
GAS FLOW AIRWAY: 40 LPM
GFR SERPL CREATININE-BSD FRML MDRD: 110 ML/MIN/1.73
GFR SERPL CREATININE-BSD FRML MDRD: 110 ML/MIN/1.73
GFR SERPL CREATININE-BSD FRML MDRD: 48 ML/MIN/1.73
GFR SERPL CREATININE-BSD FRML MDRD: 57 ML/MIN/1.73
GFR SERPL CREATININE-BSD FRML MDRD: 58 ML/MIN/1.73
GFR SERPL CREATININE-BSD FRML MDRD: 59 ML/MIN/1.73
GFR SERPL CREATININE-BSD FRML MDRD: 62 ML/MIN/1.73
GFR SERPL CREATININE-BSD FRML MDRD: 65 ML/MIN/1.73
GFR SERPL CREATININE-BSD FRML MDRD: 67 ML/MIN/1.73
GFR SERPL CREATININE-BSD FRML MDRD: 67 ML/MIN/1.73
GFR SERPL CREATININE-BSD FRML MDRD: 69 ML/MIN/1.73
GFR SERPL CREATININE-BSD FRML MDRD: 69 ML/MIN/1.73
GFR SERPL CREATININE-BSD FRML MDRD: 72 ML/MIN/1.73
GFR SERPL CREATININE-BSD FRML MDRD: 75 ML/MIN/1.73
GFR SERPL CREATININE-BSD FRML MDRD: 77 ML/MIN/1.73
GFR SERPL CREATININE-BSD FRML MDRD: 82 ML/MIN/1.73
GFR SERPL CREATININE-BSD FRML MDRD: 87 ML/MIN/1.73
GFR SERPL CREATININE-BSD FRML MDRD: 97 ML/MIN/1.73
GFR SERPL CREATININE-BSD FRML MDRD: 97 ML/MIN/1.73
GIANT PLATELETS: ABNORMAL
GIANT PLATELETS: ABNORMAL
GLOBULIN UR ELPH-MCNC: 2.1 GM/DL
GLOBULIN UR ELPH-MCNC: 2.1 GM/DL
GLOBULIN UR ELPH-MCNC: 2.3 GM/DL
GLOBULIN UR ELPH-MCNC: 2.4 GM/DL
GLOBULIN UR ELPH-MCNC: 2.6 GM/DL
GLOBULIN UR ELPH-MCNC: 2.7 GM/DL
GLOBULIN UR ELPH-MCNC: 2.8 GM/DL
GLUCOSE BLDC GLUCOMTR-MCNC: 101 MG/DL (ref 70–130)
GLUCOSE BLDC GLUCOMTR-MCNC: 101 MG/DL (ref 70–130)
GLUCOSE BLDC GLUCOMTR-MCNC: 107 MG/DL (ref 70–130)
GLUCOSE BLDC GLUCOMTR-MCNC: 109 MG/DL (ref 70–130)
GLUCOSE BLDC GLUCOMTR-MCNC: 112 MG/DL (ref 70–130)
GLUCOSE BLDC GLUCOMTR-MCNC: 113 MG/DL (ref 70–130)
GLUCOSE BLDC GLUCOMTR-MCNC: 125 MG/DL (ref 70–130)
GLUCOSE BLDC GLUCOMTR-MCNC: 126 MG/DL (ref 70–130)
GLUCOSE BLDC GLUCOMTR-MCNC: 126 MG/DL (ref 70–130)
GLUCOSE BLDC GLUCOMTR-MCNC: 127 MG/DL (ref 70–130)
GLUCOSE BLDC GLUCOMTR-MCNC: 128 MG/DL (ref 70–130)
GLUCOSE BLDC GLUCOMTR-MCNC: 132 MG/DL (ref 70–130)
GLUCOSE BLDC GLUCOMTR-MCNC: 134 MG/DL (ref 70–130)
GLUCOSE BLDC GLUCOMTR-MCNC: 134 MG/DL (ref 70–130)
GLUCOSE BLDC GLUCOMTR-MCNC: 148 MG/DL (ref 70–130)
GLUCOSE BLDC GLUCOMTR-MCNC: 154 MG/DL (ref 70–130)
GLUCOSE BLDC GLUCOMTR-MCNC: 157 MG/DL (ref 70–130)
GLUCOSE BLDC GLUCOMTR-MCNC: 160 MG/DL (ref 70–130)
GLUCOSE BLDC GLUCOMTR-MCNC: 163 MG/DL (ref 70–130)
GLUCOSE BLDC GLUCOMTR-MCNC: 167 MG/DL (ref 70–130)
GLUCOSE BLDC GLUCOMTR-MCNC: 169 MG/DL (ref 70–130)
GLUCOSE BLDC GLUCOMTR-MCNC: 171 MG/DL (ref 70–130)
GLUCOSE BLDC GLUCOMTR-MCNC: 175 MG/DL (ref 70–130)
GLUCOSE BLDC GLUCOMTR-MCNC: 177 MG/DL (ref 70–130)
GLUCOSE BLDC GLUCOMTR-MCNC: 184 MG/DL (ref 70–130)
GLUCOSE BLDC GLUCOMTR-MCNC: 186 MG/DL (ref 70–130)
GLUCOSE BLDC GLUCOMTR-MCNC: 189 MG/DL (ref 70–130)
GLUCOSE BLDC GLUCOMTR-MCNC: 192 MG/DL (ref 70–130)
GLUCOSE BLDC GLUCOMTR-MCNC: 194 MG/DL (ref 70–130)
GLUCOSE BLDC GLUCOMTR-MCNC: 197 MG/DL (ref 70–130)
GLUCOSE BLDC GLUCOMTR-MCNC: 199 MG/DL (ref 70–130)
GLUCOSE BLDC GLUCOMTR-MCNC: 201 MG/DL (ref 70–130)
GLUCOSE BLDC GLUCOMTR-MCNC: 205 MG/DL (ref 70–130)
GLUCOSE BLDC GLUCOMTR-MCNC: 208 MG/DL (ref 70–130)
GLUCOSE BLDC GLUCOMTR-MCNC: 209 MG/DL (ref 70–130)
GLUCOSE BLDC GLUCOMTR-MCNC: 210 MG/DL (ref 70–130)
GLUCOSE BLDC GLUCOMTR-MCNC: 210 MG/DL (ref 70–130)
GLUCOSE BLDC GLUCOMTR-MCNC: 211 MG/DL (ref 70–130)
GLUCOSE BLDC GLUCOMTR-MCNC: 212 MG/DL (ref 70–130)
GLUCOSE BLDC GLUCOMTR-MCNC: 215 MG/DL (ref 70–130)
GLUCOSE BLDC GLUCOMTR-MCNC: 219 MG/DL (ref 70–130)
GLUCOSE BLDC GLUCOMTR-MCNC: 220 MG/DL (ref 70–130)
GLUCOSE BLDC GLUCOMTR-MCNC: 221 MG/DL (ref 70–130)
GLUCOSE BLDC GLUCOMTR-MCNC: 223 MG/DL (ref 70–130)
GLUCOSE BLDC GLUCOMTR-MCNC: 224 MG/DL (ref 70–130)
GLUCOSE BLDC GLUCOMTR-MCNC: 230 MG/DL (ref 70–130)
GLUCOSE BLDC GLUCOMTR-MCNC: 232 MG/DL (ref 70–130)
GLUCOSE BLDC GLUCOMTR-MCNC: 234 MG/DL (ref 70–130)
GLUCOSE BLDC GLUCOMTR-MCNC: 235 MG/DL (ref 70–130)
GLUCOSE BLDC GLUCOMTR-MCNC: 236 MG/DL (ref 70–130)
GLUCOSE BLDC GLUCOMTR-MCNC: 237 MG/DL (ref 70–130)
GLUCOSE BLDC GLUCOMTR-MCNC: 239 MG/DL (ref 70–130)
GLUCOSE BLDC GLUCOMTR-MCNC: 241 MG/DL (ref 70–130)
GLUCOSE BLDC GLUCOMTR-MCNC: 245 MG/DL (ref 70–130)
GLUCOSE BLDC GLUCOMTR-MCNC: 245 MG/DL (ref 70–130)
GLUCOSE BLDC GLUCOMTR-MCNC: 255 MG/DL (ref 70–130)
GLUCOSE BLDC GLUCOMTR-MCNC: 257 MG/DL (ref 70–130)
GLUCOSE BLDC GLUCOMTR-MCNC: 259 MG/DL (ref 70–130)
GLUCOSE BLDC GLUCOMTR-MCNC: 268 MG/DL (ref 70–130)
GLUCOSE BLDC GLUCOMTR-MCNC: 271 MG/DL (ref 70–130)
GLUCOSE BLDC GLUCOMTR-MCNC: 272 MG/DL (ref 70–130)
GLUCOSE BLDC GLUCOMTR-MCNC: 272 MG/DL (ref 70–130)
GLUCOSE BLDC GLUCOMTR-MCNC: 273 MG/DL (ref 70–130)
GLUCOSE BLDC GLUCOMTR-MCNC: 281 MG/DL (ref 70–130)
GLUCOSE BLDC GLUCOMTR-MCNC: 282 MG/DL (ref 70–130)
GLUCOSE BLDC GLUCOMTR-MCNC: 282 MG/DL (ref 70–130)
GLUCOSE BLDC GLUCOMTR-MCNC: 283 MG/DL (ref 70–130)
GLUCOSE BLDC GLUCOMTR-MCNC: 294 MG/DL (ref 70–130)
GLUCOSE BLDC GLUCOMTR-MCNC: 296 MG/DL (ref 70–130)
GLUCOSE BLDC GLUCOMTR-MCNC: 299 MG/DL (ref 70–130)
GLUCOSE BLDC GLUCOMTR-MCNC: 302 MG/DL (ref 70–130)
GLUCOSE BLDC GLUCOMTR-MCNC: 325 MG/DL (ref 70–130)
GLUCOSE BLDC GLUCOMTR-MCNC: 327 MG/DL (ref 70–130)
GLUCOSE BLDC GLUCOMTR-MCNC: 338 MG/DL (ref 70–130)
GLUCOSE BLDC GLUCOMTR-MCNC: 354 MG/DL (ref 70–130)
GLUCOSE BLDC GLUCOMTR-MCNC: 384 MG/DL (ref 70–130)
GLUCOSE BLDC GLUCOMTR-MCNC: 51 MG/DL (ref 70–130)
GLUCOSE BLDC GLUCOMTR-MCNC: 59 MG/DL (ref 70–130)
GLUCOSE BLDC GLUCOMTR-MCNC: 63 MG/DL (ref 70–130)
GLUCOSE BLDC GLUCOMTR-MCNC: 67 MG/DL (ref 70–130)
GLUCOSE BLDC GLUCOMTR-MCNC: 72 MG/DL (ref 70–130)
GLUCOSE BLDC GLUCOMTR-MCNC: 75 MG/DL (ref 70–130)
GLUCOSE BLDC GLUCOMTR-MCNC: 75 MG/DL (ref 70–130)
GLUCOSE BLDC GLUCOMTR-MCNC: 79 MG/DL (ref 70–130)
GLUCOSE BLDC GLUCOMTR-MCNC: 86 MG/DL (ref 70–130)
GLUCOSE BLDC GLUCOMTR-MCNC: 87 MG/DL (ref 70–130)
GLUCOSE BLDC GLUCOMTR-MCNC: 93 MG/DL (ref 70–130)
GLUCOSE SERPL-MCNC: 123 MG/DL (ref 65–99)
GLUCOSE SERPL-MCNC: 135 MG/DL (ref 65–99)
GLUCOSE SERPL-MCNC: 137 MG/DL (ref 65–99)
GLUCOSE SERPL-MCNC: 163 MG/DL (ref 65–99)
GLUCOSE SERPL-MCNC: 174 MG/DL (ref 65–99)
GLUCOSE SERPL-MCNC: 184 MG/DL (ref 65–99)
GLUCOSE SERPL-MCNC: 200 MG/DL (ref 65–99)
GLUCOSE SERPL-MCNC: 201 MG/DL (ref 65–99)
GLUCOSE SERPL-MCNC: 213 MG/DL (ref 65–99)
GLUCOSE SERPL-MCNC: 219 MG/DL (ref 65–99)
GLUCOSE SERPL-MCNC: 220 MG/DL (ref 65–99)
GLUCOSE SERPL-MCNC: 225 MG/DL (ref 65–99)
GLUCOSE SERPL-MCNC: 233 MG/DL (ref 65–99)
GLUCOSE SERPL-MCNC: 236 MG/DL (ref 65–99)
GLUCOSE SERPL-MCNC: 237 MG/DL (ref 65–99)
GLUCOSE SERPL-MCNC: 264 MG/DL (ref 65–99)
GLUCOSE SERPL-MCNC: 274 MG/DL (ref 65–99)
GLUCOSE SERPL-MCNC: 311 MG/DL (ref 65–99)
GLUCOSE SERPL-MCNC: 343 MG/DL (ref 65–99)
GLUCOSE SERPL-MCNC: 65 MG/DL (ref 65–99)
GLUCOSE SERPL-MCNC: 75 MG/DL (ref 65–99)
GLUCOSE UR STRIP-MCNC: ABNORMAL MG/DL
GLUCOSE UR STRIP-MCNC: NEGATIVE MG/DL
GRAM STN SPEC: ABNORMAL
HBA1C MFR BLD: 6.3 % (ref 4.8–5.6)
HCO3 BLDA-SCNC: 19 MMOL/L (ref 20–26)
HCO3 BLDA-SCNC: 21.2 MMOL/L (ref 20–26)
HCO3 BLDA-SCNC: 22.3 MMOL/L (ref 20–26)
HCO3 BLDA-SCNC: 25.9 MMOL/L (ref 20–26)
HCO3 BLDA-SCNC: 26.5 MMOL/L (ref 20–26)
HCO3 BLDA-SCNC: 26.7 MMOL/L (ref 20–26)
HCO3 BLDA-SCNC: 27.6 MMOL/L (ref 20–26)
HCO3 BLDA-SCNC: 27.7 MMOL/L (ref 20–26)
HCO3 BLDA-SCNC: 28.3 MMOL/L (ref 20–26)
HCO3 BLDA-SCNC: 28.9 MMOL/L (ref 20–26)
HCT VFR BLD AUTO: 21.3 % (ref 34–46.6)
HCT VFR BLD AUTO: 22.2 % (ref 34–46.6)
HCT VFR BLD AUTO: 22.4 % (ref 34–46.6)
HCT VFR BLD AUTO: 22.9 % (ref 34–46.6)
HCT VFR BLD AUTO: 23.4 % (ref 34–46.6)
HCT VFR BLD AUTO: 23.7 % (ref 34–46.6)
HCT VFR BLD AUTO: 24.1 % (ref 34–46.6)
HCT VFR BLD AUTO: 25.4 % (ref 34–46.6)
HCT VFR BLD AUTO: 25.4 % (ref 34–46.6)
HCT VFR BLD AUTO: 25.9 % (ref 34–46.6)
HCT VFR BLD AUTO: 26.3 % (ref 34–46.6)
HCT VFR BLD AUTO: 34.1 % (ref 34–46.6)
HCT VFR BLD AUTO: 36.6 % (ref 34–46.6)
HCT VFR BLD AUTO: 36.7 % (ref 34–46.6)
HCT VFR BLD AUTO: 36.9 % (ref 34–46.6)
HCT VFR BLD AUTO: 37.6 % (ref 34–46.6)
HCT VFR BLD AUTO: 38.3 % (ref 34–46.6)
HCT VFR BLD AUTO: 38.5 % (ref 34–46.6)
HCT VFR BLD AUTO: 39.2 % (ref 34–46.6)
HCT VFR BLD AUTO: 39.5 % (ref 34–46.6)
HCT VFR BLD AUTO: 39.6 % (ref 34–46.6)
HCT VFR BLD AUTO: 40 % (ref 34–46.6)
HCT VFR BLD AUTO: 40.6 % (ref 34–46.6)
HDLC SERPL-MCNC: 69 MG/DL (ref 40–60)
HEMOCCULT STL QL: POSITIVE
HGB BLD-MCNC: 11.4 G/DL (ref 12–15.9)
HGB BLD-MCNC: 12.2 G/DL (ref 12–15.9)
HGB BLD-MCNC: 12.3 G/DL (ref 12–15.9)
HGB BLD-MCNC: 12.4 G/DL (ref 12–15.9)
HGB BLD-MCNC: 12.6 G/DL (ref 12–15.9)
HGB BLD-MCNC: 12.9 G/DL (ref 12–15.9)
HGB BLD-MCNC: 13 G/DL (ref 12–15.9)
HGB BLD-MCNC: 13.2 G/DL (ref 12–15.9)
HGB BLD-MCNC: 13.3 G/DL (ref 12–15.9)
HGB BLD-MCNC: 13.4 G/DL (ref 12–15.9)
HGB BLD-MCNC: 13.6 G/DL (ref 12–15.9)
HGB BLD-MCNC: 13.7 G/DL (ref 12–15.9)
HGB BLD-MCNC: 6.7 G/DL (ref 12–15.9)
HGB BLD-MCNC: 7 G/DL (ref 12–15.9)
HGB BLD-MCNC: 7.3 G/DL (ref 12–15.9)
HGB BLD-MCNC: 7.3 G/DL (ref 12–15.9)
HGB BLD-MCNC: 7.5 G/DL (ref 12–15.9)
HGB BLD-MCNC: 7.8 G/DL (ref 12–15.9)
HGB BLD-MCNC: 7.9 G/DL (ref 12–15.9)
HGB BLD-MCNC: 7.9 G/DL (ref 12–15.9)
HGB BLD-MCNC: 8.2 G/DL (ref 12–15.9)
HGB BLD-MCNC: 8.5 G/DL (ref 12–15.9)
HGB BLD-MCNC: 8.6 G/DL (ref 12–15.9)
HGB UR QL STRIP.AUTO: ABNORMAL
HGB UR QL STRIP.AUTO: ABNORMAL
HGB UR QL STRIP.AUTO: NEGATIVE
HYALINE CASTS UR QL AUTO: ABNORMAL /LPF
HYPOCHROMIA BLD QL: ABNORMAL
HYPOCHROMIA BLD QL: ABNORMAL
IMM GRANULOCYTES # BLD AUTO: 0.04 10*3/MM3 (ref 0–0.05)
IMM GRANULOCYTES # BLD AUTO: 0.08 10*3/MM3 (ref 0–0.05)
IMM GRANULOCYTES # BLD AUTO: 0.19 10*3/MM3 (ref 0–0.05)
IMM GRANULOCYTES # BLD AUTO: 0.92 10*3/MM3 (ref 0–0.05)
IMM GRANULOCYTES NFR BLD AUTO: 0.5 % (ref 0–0.5)
IMM GRANULOCYTES NFR BLD AUTO: 0.7 % (ref 0–0.5)
IMM GRANULOCYTES NFR BLD AUTO: 0.9 % (ref 0–0.5)
IMM GRANULOCYTES NFR BLD AUTO: 3.8 % (ref 0–0.5)
INHALED O2 CONCENTRATION: 100 %
INHALED O2 CONCENTRATION: 70 %
INR PPP: 1.03 (ref 0.91–1.09)
INR PPP: 1.21 (ref 0.91–1.09)
IPAP: 16
ISOLATED FROM: ABNORMAL
KETONES UR QL STRIP: ABNORMAL
KETONES UR QL STRIP: ABNORMAL
KETONES UR QL STRIP: NEGATIVE
L PNEUMO1 AG UR QL IA: NEGATIVE
LDH SERPL-CCNC: 352 U/L (ref 135–214)
LDH SERPL-CCNC: 392 U/L (ref 135–214)
LDH SERPL-CCNC: 411 U/L (ref 135–214)
LDH SERPL-CCNC: 482 U/L (ref 135–214)
LDH SERPL-CCNC: 567 U/L (ref 135–214)
LDH SERPL-CCNC: 587 U/L (ref 135–214)
LDH SERPL-CCNC: 611 U/L (ref 135–214)
LDH SERPL-CCNC: 639 U/L (ref 135–214)
LDH SERPL-CCNC: 680 U/L (ref 135–214)
LDH SERPL-CCNC: 796 U/L (ref 135–214)
LDLC SERPL CALC-MCNC: 126 MG/DL (ref 0–100)
LDLC/HDLC SERPL: 1.78 {RATIO}
LEFT ATRIUM VOLUME INDEX: 20.3 ML/M2
LEFT ATRIUM VOLUME: 33.1 CM3
LEUKOCYTE ESTERASE UR QL STRIP.AUTO: ABNORMAL
LEUKOCYTE ESTERASE UR QL STRIP.AUTO: NEGATIVE
LEUKOCYTE ESTERASE UR QL STRIP.AUTO: NEGATIVE
LYMPHOCYTES # BLD AUTO: 0.25 10*3/MM3 (ref 0.7–3.1)
LYMPHOCYTES # BLD AUTO: 0.25 10*3/MM3 (ref 0.7–3.1)
LYMPHOCYTES # BLD AUTO: 0.28 10*3/MM3 (ref 0.7–3.1)
LYMPHOCYTES # BLD AUTO: 0.28 10*3/MM3 (ref 0.7–3.1)
LYMPHOCYTES # BLD AUTO: 0.3 10*3/MM3 (ref 0.7–3.1)
LYMPHOCYTES # BLD AUTO: 0.31 10*3/MM3 (ref 0.7–3.1)
LYMPHOCYTES # BLD AUTO: 0.34 10*3/MM3 (ref 0.7–3.1)
LYMPHOCYTES # BLD AUTO: 0.39 10*3/MM3 (ref 0.7–3.1)
LYMPHOCYTES # BLD AUTO: 0.39 10*3/MM3 (ref 0.7–3.1)
LYMPHOCYTES # BLD AUTO: 0.5 10*3/MM3 (ref 0.7–3.1)
LYMPHOCYTES # BLD AUTO: 0.55 10*3/MM3 (ref 0.7–3.1)
LYMPHOCYTES # BLD MANUAL: 0 10*3/MM3 (ref 0.7–3.1)
LYMPHOCYTES # BLD MANUAL: 0.21 10*3/MM3 (ref 0.7–3.1)
LYMPHOCYTES # BLD MANUAL: 0.25 10*3/MM3 (ref 0.7–3.1)
LYMPHOCYTES # BLD MANUAL: 0.52 10*3/MM3 (ref 0.7–3.1)
LYMPHOCYTES # BLD MANUAL: 0.69 10*3/MM3 (ref 0.7–3.1)
LYMPHOCYTES NFR BLD AUTO: 1 % (ref 19.6–45.3)
LYMPHOCYTES NFR BLD AUTO: 1.2 % (ref 19.6–45.3)
LYMPHOCYTES NFR BLD AUTO: 1.5 % (ref 19.6–45.3)
LYMPHOCYTES NFR BLD AUTO: 1.6 % (ref 19.6–45.3)
LYMPHOCYTES NFR BLD AUTO: 1.7 % (ref 19.6–45.3)
LYMPHOCYTES NFR BLD AUTO: 1.7 % (ref 19.6–45.3)
LYMPHOCYTES NFR BLD AUTO: 1.9 % (ref 19.6–45.3)
LYMPHOCYTES NFR BLD AUTO: 2.4 % (ref 19.6–45.3)
LYMPHOCYTES NFR BLD AUTO: 3.5 % (ref 19.6–45.3)
LYMPHOCYTES NFR BLD AUTO: 4.4 % (ref 19.6–45.3)
LYMPHOCYTES NFR BLD AUTO: 6 % (ref 19.6–45.3)
LYMPHOCYTES NFR BLD MANUAL: 0 % (ref 19.6–45.3)
LYMPHOCYTES NFR BLD MANUAL: 1 % (ref 19.6–45.3)
LYMPHOCYTES NFR BLD MANUAL: 1 % (ref 19.6–45.3)
LYMPHOCYTES NFR BLD MANUAL: 1 % (ref 5–12)
LYMPHOCYTES NFR BLD MANUAL: 2 % (ref 5–12)
LYMPHOCYTES NFR BLD MANUAL: 3 % (ref 19.6–45.3)
LYMPHOCYTES NFR BLD MANUAL: 3 % (ref 19.6–45.3)
LYMPHOCYTES NFR BLD MANUAL: 3 % (ref 5–12)
LYMPHOCYTES NFR BLD MANUAL: 4 % (ref 5–12)
LYMPHOCYTES NFR BLD MANUAL: 5 % (ref 5–12)
Lab: ABNORMAL
MACROCYTES BLD QL SMEAR: ABNORMAL
MAGNESIUM SERPL-MCNC: 2.1 MG/DL (ref 1.7–2.3)
MAXIMAL PREDICTED HEART RATE: 128 BPM
MCH RBC QN AUTO: 29.8 PG (ref 26.6–33)
MCH RBC QN AUTO: 30.3 PG (ref 26.6–33)
MCH RBC QN AUTO: 30.3 PG (ref 26.6–33)
MCH RBC QN AUTO: 30.6 PG (ref 26.6–33)
MCH RBC QN AUTO: 31.8 PG (ref 26.6–33)
MCH RBC QN AUTO: 31.9 PG (ref 26.6–33)
MCH RBC QN AUTO: 32 PG (ref 26.6–33)
MCH RBC QN AUTO: 32.1 PG (ref 26.6–33)
MCH RBC QN AUTO: 32.2 PG (ref 26.6–33)
MCH RBC QN AUTO: 32.2 PG (ref 26.6–33)
MCH RBC QN AUTO: 32.3 PG (ref 26.6–33)
MCH RBC QN AUTO: 32.4 PG (ref 26.6–33)
MCH RBC QN AUTO: 32.5 PG (ref 26.6–33)
MCH RBC QN AUTO: 32.6 PG (ref 26.6–33)
MCH RBC QN AUTO: 32.6 PG (ref 26.6–33)
MCH RBC QN AUTO: 32.9 PG (ref 26.6–33)
MCHC RBC AUTO-ENTMCNC: 31.1 G/DL (ref 31.5–35.7)
MCHC RBC AUTO-ENTMCNC: 31.2 G/DL (ref 31.5–35.7)
MCHC RBC AUTO-ENTMCNC: 31.3 G/DL (ref 31.5–35.7)
MCHC RBC AUTO-ENTMCNC: 31.5 G/DL (ref 31.5–35.7)
MCHC RBC AUTO-ENTMCNC: 32.3 G/DL (ref 31.5–35.7)
MCHC RBC AUTO-ENTMCNC: 32.4 G/DL (ref 31.5–35.7)
MCHC RBC AUTO-ENTMCNC: 32.8 G/DL (ref 31.5–35.7)
MCHC RBC AUTO-ENTMCNC: 32.8 G/DL (ref 31.5–35.7)
MCHC RBC AUTO-ENTMCNC: 32.9 G/DL (ref 31.5–35.7)
MCHC RBC AUTO-ENTMCNC: 32.9 G/DL (ref 31.5–35.7)
MCHC RBC AUTO-ENTMCNC: 33 G/DL (ref 31.5–35.7)
MCHC RBC AUTO-ENTMCNC: 33.3 G/DL (ref 31.5–35.7)
MCHC RBC AUTO-ENTMCNC: 33.4 G/DL (ref 31.5–35.7)
MCHC RBC AUTO-ENTMCNC: 33.5 G/DL (ref 31.5–35.7)
MCHC RBC AUTO-ENTMCNC: 33.7 G/DL (ref 31.5–35.7)
MCHC RBC AUTO-ENTMCNC: 34.3 G/DL (ref 31.5–35.7)
MCHC RBC AUTO-ENTMCNC: 35 G/DL (ref 31.5–35.7)
MCV RBC AUTO: 101.4 FL (ref 79–97)
MCV RBC AUTO: 92.7 FL (ref 79–97)
MCV RBC AUTO: 93 FL (ref 79–97)
MCV RBC AUTO: 93.2 FL (ref 79–97)
MCV RBC AUTO: 94.8 FL (ref 79–97)
MCV RBC AUTO: 95.2 FL (ref 79–97)
MCV RBC AUTO: 95.6 FL (ref 79–97)
MCV RBC AUTO: 95.8 FL (ref 79–97)
MCV RBC AUTO: 96.3 FL (ref 79–97)
MCV RBC AUTO: 96.9 FL (ref 79–97)
MCV RBC AUTO: 97 FL (ref 79–97)
MCV RBC AUTO: 97 FL (ref 79–97)
MCV RBC AUTO: 97.1 FL (ref 79–97)
MCV RBC AUTO: 97.6 FL (ref 79–97)
MCV RBC AUTO: 97.8 FL (ref 79–97)
MCV RBC AUTO: 98.1 FL (ref 79–97)
MCV RBC AUTO: 98.2 FL (ref 79–97)
MCV RBC AUTO: 98.3 FL (ref 79–97)
MCV RBC AUTO: 99.2 FL (ref 79–97)
METAMYELOCYTES NFR BLD MANUAL: 1 % (ref 0–0)
MICROCYTES BLD QL: ABNORMAL
MICROCYTES BLD QL: ABNORMAL
MODALITY: ABNORMAL
MONOCYTES # BLD AUTO: 0.15 10*3/MM3 (ref 0.1–0.9)
MONOCYTES # BLD AUTO: 0.46 10*3/MM3 (ref 0.1–0.9)
MONOCYTES # BLD AUTO: 0.47 10*3/MM3 (ref 0.1–0.9)
MONOCYTES # BLD AUTO: 0.5 10*3/MM3 (ref 0.1–0.9)
MONOCYTES # BLD AUTO: 0.52 10*3/MM3 (ref 0.1–0.9)
MONOCYTES # BLD AUTO: 0.63 10*3/MM3 (ref 0.1–0.9)
MONOCYTES # BLD AUTO: 0.64 10*3/MM3 (ref 0.1–0.9)
MONOCYTES # BLD AUTO: 0.69 10*3/MM3 (ref 0.1–0.9)
MONOCYTES # BLD AUTO: 0.84 10*3/MM3 (ref 0.1–0.9)
MONOCYTES # BLD AUTO: 0.87 10*3/MM3 (ref 0.1–0.9)
MONOCYTES # BLD AUTO: 0.89 10*3/MM3 (ref 0.1–0.9)
MONOCYTES # BLD AUTO: 0.89 10*3/MM3 (ref 0.1–0.9)
MONOCYTES # BLD AUTO: 0.92 10*3/MM3 (ref 0.1–0.9)
MONOCYTES # BLD AUTO: 0.96 10*3/MM3 (ref 0.1–0.9)
MONOCYTES # BLD AUTO: 1 10*3/MM3 (ref 0.1–0.9)
MONOCYTES # BLD AUTO: 1.02 10*3/MM3 (ref 0.1–0.9)
MONOCYTES # BLD AUTO: 1.06 10*3/MM3 (ref 0.1–0.9)
MONOCYTES # BLD AUTO: 1.12 10*3/MM3 (ref 0.1–0.9)
MONOCYTES # BLD AUTO: 1.23 10*3/MM3 (ref 0.1–0.9)
MONOCYTES NFR BLD AUTO: 11.9 % (ref 5–12)
MONOCYTES NFR BLD AUTO: 12.2 % (ref 5–12)
MONOCYTES NFR BLD AUTO: 2.9 % (ref 5–12)
MONOCYTES NFR BLD AUTO: 3.5 % (ref 5–12)
MONOCYTES NFR BLD AUTO: 3.5 % (ref 5–12)
MONOCYTES NFR BLD AUTO: 4.1 % (ref 5–12)
MONOCYTES NFR BLD AUTO: 4.1 % (ref 5–12)
MONOCYTES NFR BLD AUTO: 5.9 % (ref 5–12)
MONOCYTES NFR BLD AUTO: 5.9 % (ref 5–12)
MONOCYTES NFR BLD AUTO: 6 % (ref 5–12)
MONOCYTES NFR BLD AUTO: 7.9 % (ref 5–12)
MRSA DNA SPEC QL NAA+PROBE: NORMAL
MYELOCYTES NFR BLD MANUAL: 1 % (ref 0–0)
MYELOCYTES NFR BLD MANUAL: 1 % (ref 0–0)
MYELOCYTES NFR BLD MANUAL: 2 % (ref 0–0)
NEUTROPHILS # BLD AUTO: 14.71 10*3/MM3 (ref 1.7–7)
NEUTROPHILS # BLD AUTO: 15.16 10*3/MM3 (ref 1.7–7)
NEUTROPHILS # BLD AUTO: 16.21 10*3/MM3 (ref 1.7–7)
NEUTROPHILS # BLD AUTO: 16.23 10*3/MM3 (ref 1.7–7)
NEUTROPHILS # BLD AUTO: 20.04 10*3/MM3 (ref 1.7–7)
NEUTROPHILS # BLD AUTO: 20.72 10*3/MM3 (ref 1.7–7)
NEUTROPHILS # BLD AUTO: 22.2 10*3/MM3 (ref 1.7–7)
NEUTROPHILS # BLD AUTO: 23.97 10*3/MM3 (ref 1.7–7)
NEUTROPHILS NFR BLD AUTO: 14.18 10*3/MM3 (ref 1.7–7)
NEUTROPHILS NFR BLD AUTO: 15.07 10*3/MM3 (ref 1.7–7)
NEUTROPHILS NFR BLD AUTO: 15.09 10*3/MM3 (ref 1.7–7)
NEUTROPHILS NFR BLD AUTO: 16.42 10*3/MM3 (ref 1.7–7)
NEUTROPHILS NFR BLD AUTO: 18.24 10*3/MM3 (ref 1.7–7)
NEUTROPHILS NFR BLD AUTO: 20.42 10*3/MM3 (ref 1.7–7)
NEUTROPHILS NFR BLD AUTO: 22.01 10*3/MM3 (ref 1.7–7)
NEUTROPHILS NFR BLD AUTO: 22.42 10*3/MM3 (ref 1.7–7)
NEUTROPHILS NFR BLD AUTO: 7.36 10*3/MM3 (ref 1.7–7)
NEUTROPHILS NFR BLD AUTO: 7.38 10*3/MM3 (ref 1.7–7)
NEUTROPHILS NFR BLD AUTO: 80.3 % (ref 42.7–76)
NEUTROPHILS NFR BLD AUTO: 82.9 % (ref 42.7–76)
NEUTROPHILS NFR BLD AUTO: 87.1 % (ref 42.7–76)
NEUTROPHILS NFR BLD AUTO: 87.5 % (ref 42.7–76)
NEUTROPHILS NFR BLD AUTO: 89.3 % (ref 42.7–76)
NEUTROPHILS NFR BLD AUTO: 89.5 % (ref 42.7–76)
NEUTROPHILS NFR BLD AUTO: 9.86 10*3/MM3 (ref 1.7–7)
NEUTROPHILS NFR BLD AUTO: 90.9 % (ref 42.7–76)
NEUTROPHILS NFR BLD AUTO: 91.1 % (ref 42.7–76)
NEUTROPHILS NFR BLD AUTO: 91.5 % (ref 42.7–76)
NEUTROPHILS NFR BLD AUTO: 93.1 % (ref 42.7–76)
NEUTROPHILS NFR BLD AUTO: 94 % (ref 42.7–76)
NEUTROPHILS NFR BLD MANUAL: 84 % (ref 42.7–76)
NEUTROPHILS NFR BLD MANUAL: 90 % (ref 42.7–76)
NEUTROPHILS NFR BLD MANUAL: 94 % (ref 42.7–76)
NEUTROPHILS NFR BLD MANUAL: 94 % (ref 42.7–76)
NEUTROPHILS NFR BLD MANUAL: 94.1 % (ref 42.7–76)
NEUTROPHILS NFR BLD MANUAL: 95 % (ref 42.7–76)
NEUTROPHILS NFR BLD MANUAL: 96 % (ref 42.7–76)
NEUTROPHILS NFR BLD MANUAL: 96 % (ref 42.7–76)
NEUTS BAND NFR BLD MANUAL: 1 % (ref 0–5)
NEUTS BAND NFR BLD MANUAL: 4 % (ref 0–5)
NEUTS BAND NFR BLD MANUAL: 9 % (ref 0–5)
NEUTS VAC BLD QL SMEAR: ABNORMAL
NEUTS VAC BLD QL SMEAR: ABNORMAL
NITRITE UR QL STRIP: NEGATIVE
NOTIFIED BY: ABNORMAL
NOTIFIED WHO: ABNORMAL
NRBC BLD AUTO-RTO: 0 /100 WBC (ref 0–0.2)
NRBC BLD AUTO-RTO: 0.1 /100 WBC (ref 0–0.2)
NT-PROBNP SERPL-MCNC: 5135 PG/ML (ref 0–1800)
NT-PROBNP SERPL-MCNC: 748.1 PG/ML (ref 0–1800)
OVALOCYTES BLD QL SMEAR: ABNORMAL
PATH INTERP BLD-IMP: NORMAL
PCO2 BLDA: 29.1 MM HG (ref 35–45)
PCO2 BLDA: 29.1 MM HG (ref 35–45)
PCO2 BLDA: 32 MM HG (ref 35–45)
PCO2 BLDA: 34.1 MM HG (ref 35–45)
PCO2 BLDA: 38.6 MM HG (ref 35–45)
PCO2 BLDA: 38.8 MM HG (ref 35–45)
PCO2 BLDA: 49.4 MM HG (ref 35–45)
PCO2 BLDA: 52.9 MM HG (ref 35–45)
PCO2 BLDA: 55 MM HG (ref 35–45)
PCO2 BLDA: 67 MM HG (ref 35–45)
PCO2 TEMP ADJ BLD: 29.1 MM HG (ref 35–45)
PCO2 TEMP ADJ BLD: 29.1 MM HG (ref 35–45)
PCO2 TEMP ADJ BLD: 32 MM HG (ref 35–45)
PCO2 TEMP ADJ BLD: 34.1 MM HG (ref 35–45)
PCO2 TEMP ADJ BLD: 38.6 MM HG (ref 35–45)
PCO2 TEMP ADJ BLD: 38.8 MM HG (ref 35–45)
PCO2 TEMP ADJ BLD: 49.4 MM HG (ref 35–45)
PCO2 TEMP ADJ BLD: 52.9 MM HG (ref 35–45)
PCO2 TEMP ADJ BLD: 55 MM HG (ref 35–45)
PCO2 TEMP ADJ BLD: 67 MM HG (ref 35–45)
PEEP RESPIRATORY: 10 CM[H2O]
PEEP RESPIRATORY: 12 CM[H2O]
PH BLDA: 7.22 PH UNITS (ref 7.35–7.45)
PH BLDA: 7.32 PH UNITS (ref 7.35–7.45)
PH BLDA: 7.33 PH UNITS (ref 7.35–7.45)
PH BLDA: 7.38 PH UNITS (ref 7.35–7.45)
PH BLDA: 7.42 PH UNITS (ref 7.35–7.45)
PH BLDA: 7.43 PH UNITS (ref 7.35–7.45)
PH BLDA: 7.45 PH UNITS (ref 7.35–7.45)
PH BLDA: 7.45 PH UNITS (ref 7.35–7.45)
PH BLDA: 7.49 PH UNITS (ref 7.35–7.45)
PH BLDA: 7.49 PH UNITS (ref 7.35–7.45)
PH UR STRIP.AUTO: 5.5 [PH] (ref 5–8)
PH UR STRIP.AUTO: 5.5 [PH] (ref 5–8)
PH UR STRIP.AUTO: 6 [PH] (ref 5–8)
PH UR STRIP.AUTO: <=5 [PH] (ref 5–8)
PH UR STRIP.AUTO: <=5 [PH] (ref 5–8)
PH, TEMP CORRECTED: 7.22 PH UNITS (ref 7.35–7.45)
PH, TEMP CORRECTED: 7.32 PH UNITS (ref 7.35–7.45)
PH, TEMP CORRECTED: 7.33 PH UNITS (ref 7.35–7.45)
PH, TEMP CORRECTED: 7.38 PH UNITS (ref 7.35–7.45)
PH, TEMP CORRECTED: 7.42 PH UNITS (ref 7.35–7.45)
PH, TEMP CORRECTED: 7.43 PH UNITS (ref 7.35–7.45)
PH, TEMP CORRECTED: 7.45 PH UNITS (ref 7.35–7.45)
PH, TEMP CORRECTED: 7.45 PH UNITS (ref 7.35–7.45)
PH, TEMP CORRECTED: 7.49 PH UNITS (ref 7.35–7.45)
PH, TEMP CORRECTED: 7.49 PH UNITS (ref 7.35–7.45)
PLAT MORPH BLD: NORMAL
PLAT MORPH BLD: NORMAL
PLATELET # BLD AUTO: 104 10*3/MM3 (ref 140–450)
PLATELET # BLD AUTO: 112 10*3/MM3 (ref 140–450)
PLATELET # BLD AUTO: 114 10*3/MM3 (ref 140–450)
PLATELET # BLD AUTO: 117 10*3/MM3 (ref 140–450)
PLATELET # BLD AUTO: 121 10*3/MM3 (ref 140–450)
PLATELET # BLD AUTO: 125 10*3/MM3 (ref 140–450)
PLATELET # BLD AUTO: 128 10*3/MM3 (ref 140–450)
PLATELET # BLD AUTO: 154 10*3/MM3 (ref 140–450)
PLATELET # BLD AUTO: 156 10*3/MM3 (ref 140–450)
PLATELET # BLD AUTO: 167 10*3/MM3 (ref 140–450)
PLATELET # BLD AUTO: 171 10*3/MM3 (ref 140–450)
PLATELET # BLD AUTO: 200 10*3/MM3 (ref 140–450)
PLATELET # BLD AUTO: 219 10*3/MM3 (ref 140–450)
PLATELET # BLD AUTO: 71 10*3/MM3 (ref 140–450)
PLATELET # BLD AUTO: 73 10*3/MM3 (ref 140–450)
PLATELET # BLD AUTO: 75 10*3/MM3 (ref 140–450)
PLATELET # BLD AUTO: 91 10*3/MM3 (ref 140–450)
PLATELET # BLD AUTO: 92 10*3/MM3 (ref 140–450)
PLATELET # BLD AUTO: 94 10*3/MM3 (ref 140–450)
PLATELET # BLD AUTO: 95 10*3/MM3 (ref 140–450)
PLATELET # BLD AUTO: 99 10*3/MM3 (ref 140–450)
PMV BLD AUTO: 13.7 FL (ref 6–12)
PMV BLD AUTO: 14 FL (ref 6–12)
PMV BLD AUTO: ABNORMAL FL
PO2 BLDA: 189 MM HG (ref 83–108)
PO2 BLDA: 52.4 MM HG (ref 83–108)
PO2 BLDA: 53 MM HG (ref 83–108)
PO2 BLDA: 55.8 MM HG (ref 83–108)
PO2 BLDA: 59.2 MM HG (ref 83–108)
PO2 BLDA: 60.4 MM HG (ref 83–108)
PO2 BLDA: 61.8 MM HG (ref 83–108)
PO2 BLDA: 72.2 MM HG (ref 83–108)
PO2 BLDA: 77.3 MM HG (ref 83–108)
PO2 BLDA: 83.6 MM HG (ref 83–108)
PO2 TEMP ADJ BLD: 189 MM HG (ref 83–108)
PO2 TEMP ADJ BLD: 52.4 MM HG (ref 83–108)
PO2 TEMP ADJ BLD: 53 MM HG (ref 83–108)
PO2 TEMP ADJ BLD: 55.8 MM HG (ref 83–108)
PO2 TEMP ADJ BLD: 59.2 MM HG (ref 83–108)
PO2 TEMP ADJ BLD: 60.4 MM HG (ref 83–108)
PO2 TEMP ADJ BLD: 61.8 MM HG (ref 83–108)
PO2 TEMP ADJ BLD: 72.2 MM HG (ref 83–108)
PO2 TEMP ADJ BLD: 77.3 MM HG (ref 83–108)
PO2 TEMP ADJ BLD: 83.6 MM HG (ref 83–108)
POIKILOCYTOSIS BLD QL SMEAR: ABNORMAL
POLYCHROMASIA BLD QL SMEAR: ABNORMAL
POTASSIUM SERPL-SCNC: 3 MMOL/L (ref 3.5–5.2)
POTASSIUM SERPL-SCNC: 3.2 MMOL/L (ref 3.5–5.2)
POTASSIUM SERPL-SCNC: 3.4 MMOL/L (ref 3.5–5.2)
POTASSIUM SERPL-SCNC: 3.4 MMOL/L (ref 3.5–5.2)
POTASSIUM SERPL-SCNC: 3.5 MMOL/L (ref 3.5–5.2)
POTASSIUM SERPL-SCNC: 3.6 MMOL/L (ref 3.5–5.2)
POTASSIUM SERPL-SCNC: 3.7 MMOL/L (ref 3.5–5.2)
POTASSIUM SERPL-SCNC: 3.8 MMOL/L (ref 3.5–5.2)
POTASSIUM SERPL-SCNC: 3.8 MMOL/L (ref 3.5–5.2)
POTASSIUM SERPL-SCNC: 4.3 MMOL/L (ref 3.5–5.2)
POTASSIUM SERPL-SCNC: 4.5 MMOL/L (ref 3.5–5.2)
POTASSIUM SERPL-SCNC: 4.6 MMOL/L (ref 3.5–5.2)
POTASSIUM SERPL-SCNC: 4.9 MMOL/L (ref 3.5–5.2)
POTASSIUM SERPL-SCNC: 5.1 MMOL/L (ref 3.5–5.2)
POTASSIUM SERPL-SCNC: 5.4 MMOL/L (ref 3.5–5.2)
POTASSIUM SERPL-SCNC: 5.6 MMOL/L (ref 3.5–5.2)
POTASSIUM SERPL-SCNC: 5.6 MMOL/L (ref 3.5–5.2)
POTASSIUM SERPL-SCNC: 6 MMOL/L (ref 3.5–5.2)
POTASSIUM SERPL-SCNC: 6.1 MMOL/L (ref 3.5–5.2)
PROCALCITONIN SERPL-MCNC: 0.16 NG/ML (ref 0–0.25)
PROCALCITONIN SERPL-MCNC: 0.31 NG/ML (ref 0–0.25)
PROCALCITONIN SERPL-MCNC: 1.1 NG/ML (ref 0–0.25)
PROT SERPL-MCNC: 4.6 G/DL (ref 6–8.5)
PROT SERPL-MCNC: 4.8 G/DL (ref 6–8.5)
PROT SERPL-MCNC: 4.9 G/DL (ref 6–8.5)
PROT SERPL-MCNC: 5.1 G/DL (ref 6–8.5)
PROT SERPL-MCNC: 5.5 G/DL (ref 6–8.5)
PROT SERPL-MCNC: 5.6 G/DL (ref 6–8.5)
PROT SERPL-MCNC: 5.6 G/DL (ref 6–8.5)
PROT SERPL-MCNC: 5.7 G/DL (ref 6–8.5)
PROT SERPL-MCNC: 5.9 G/DL (ref 6–8.5)
PROT SERPL-MCNC: 6 G/DL (ref 6–8.5)
PROT SERPL-MCNC: 6 G/DL (ref 6–8.5)
PROT SERPL-MCNC: 6.3 G/DL (ref 6–8.5)
PROT SERPL-MCNC: 6.3 G/DL (ref 6–8.5)
PROT SERPL-MCNC: 6.6 G/DL (ref 6–8.5)
PROT UR QL STRIP: ABNORMAL
PROT UR QL STRIP: NEGATIVE
PROTHROMBIN TIME: 13.1 SECONDS (ref 11.9–14.6)
PROTHROMBIN TIME: 14.9 SECONDS (ref 11.9–14.6)
PSV: 8 CMH2O
QT INTERVAL: 270 MS
QTC INTERVAL: 409 MS
RBC # BLD AUTO: 2.1 10*6/MM3 (ref 3.77–5.28)
RBC # BLD AUTO: 2.26 10*6/MM3 (ref 3.77–5.28)
RBC # BLD AUTO: 2.31 10*6/MM3 (ref 3.77–5.28)
RBC # BLD AUTO: 2.41 10*6/MM3 (ref 3.77–5.28)
RBC # BLD AUTO: 2.43 10*6/MM3 (ref 3.77–5.28)
RBC # BLD AUTO: 2.48 10*6/MM3 (ref 3.77–5.28)
RBC # BLD AUTO: 2.64 10*6/MM3 (ref 3.77–5.28)
RBC # BLD AUTO: 2.65 10*6/MM3 (ref 3.77–5.28)
RBC # BLD AUTO: 2.68 10*6/MM3 (ref 3.77–5.28)
RBC # BLD AUTO: 3.54 10*6/MM3 (ref 3.77–5.28)
RBC # BLD AUTO: 3.78 10*6/MM3 (ref 3.77–5.28)
RBC # BLD AUTO: 3.8 10*6/MM3 (ref 3.77–5.28)
RBC # BLD AUTO: 3.88 10*6/MM3 (ref 3.77–5.28)
RBC # BLD AUTO: 3.96 10*6/MM3 (ref 3.77–5.28)
RBC # BLD AUTO: 4.04 10*6/MM3 (ref 3.77–5.28)
RBC # BLD AUTO: 4.05 10*6/MM3 (ref 3.77–5.28)
RBC # BLD AUTO: 4.1 10*6/MM3 (ref 3.77–5.28)
RBC # BLD AUTO: 4.11 10*6/MM3 (ref 3.77–5.28)
RBC # BLD AUTO: 4.13 10*6/MM3 (ref 3.77–5.28)
RBC # BLD AUTO: 4.14 10*6/MM3 (ref 3.77–5.28)
RBC # BLD AUTO: 4.2 10*6/MM3 (ref 3.77–5.28)
RBC # UR: ABNORMAL /HPF
REF LAB TEST METHOD: ABNORMAL
RH BLD: POSITIVE
ROULEAUX BLD QL SMEAR: ABNORMAL
S PNEUM AG SPEC QL LA: NEGATIVE
SAO2 % BLDCOA: 87.8 % (ref 94–99)
SAO2 % BLDCOA: 89 % (ref 94–99)
SAO2 % BLDCOA: 89.9 % (ref 94–99)
SAO2 % BLDCOA: 91.9 % (ref 94–99)
SAO2 % BLDCOA: 92.7 % (ref 94–99)
SAO2 % BLDCOA: 93.3 % (ref 94–99)
SAO2 % BLDCOA: 94 % (ref 94–99)
SAO2 % BLDCOA: 95.7 % (ref 94–99)
SAO2 % BLDCOA: 97 % (ref 94–99)
SAO2 % BLDCOA: 99.8 % (ref 94–99)
SARS-COV-2 RNA PNL SPEC NAA+PROBE: DETECTED
SCHISTOCYTES BLD QL SMEAR: ABNORMAL
SET MECH RESP RATE: 14
SET MECH RESP RATE: 16
SET MECH RESP RATE: 20
SMALL PLATELETS BLD QL SMEAR: ABNORMAL
SODIUM SERPL-SCNC: 133 MMOL/L (ref 136–145)
SODIUM SERPL-SCNC: 134 MMOL/L (ref 136–145)
SODIUM SERPL-SCNC: 135 MMOL/L (ref 136–145)
SODIUM SERPL-SCNC: 136 MMOL/L (ref 136–145)
SODIUM SERPL-SCNC: 137 MMOL/L (ref 136–145)
SODIUM SERPL-SCNC: 138 MMOL/L (ref 136–145)
SODIUM SERPL-SCNC: 138 MMOL/L (ref 136–145)
SODIUM SERPL-SCNC: 139 MMOL/L (ref 136–145)
SODIUM SERPL-SCNC: 142 MMOL/L (ref 136–145)
SODIUM SERPL-SCNC: 144 MMOL/L (ref 136–145)
SODIUM SERPL-SCNC: 150 MMOL/L (ref 136–145)
SODIUM SERPL-SCNC: 152 MMOL/L (ref 136–145)
SP GR UR STRIP: 1.01 (ref 1–1.03)
SP GR UR STRIP: 1.01 (ref 1–1.03)
SP GR UR STRIP: 1.02 (ref 1–1.03)
SP GR UR STRIP: 1.02 (ref 1–1.03)
SP GR UR STRIP: >1.03 (ref 1–1.03)
SQUAMOUS #/AREA URNS HPF: ABNORMAL /HPF
STRESS TARGET HR: 109 BPM
T&S EXPIRATION DATE: NORMAL
T4 FREE SERPL-MCNC: 1 NG/DL (ref 0.93–1.7)
TOXIC GRANULATION: ABNORMAL
TOXIC GRANULATION: ABNORMAL
TRIGL SERPL-MCNC: 116 MG/DL (ref 0–150)
TROPONIN T SERPL-MCNC: <0.01 NG/ML (ref 0–0.03)
TSH SERPL DL<=0.05 MIU/L-ACNC: 6.49 UIU/ML (ref 0.27–4.2)
UNIT  ABO: NORMAL
UNIT  RH: NORMAL
UROBILINOGEN UR QL STRIP: ABNORMAL
VARIANT LYMPHS NFR BLD MANUAL: 1 % (ref 0–5)
VARIANT LYMPHS NFR BLD MANUAL: 1 % (ref 0–5)
VARIANT LYMPHS NFR BLD MANUAL: 3 % (ref 0–5)
VENTILATOR MODE: ABNORMAL
VENTILATOR MODE: AC
VLDLC SERPL-MCNC: 20 MG/DL (ref 5–40)
VT ON VENT VENT: 500 ML
WBC # BLD AUTO: 11.26 10*3/MM3 (ref 3.4–10.8)
WBC # BLD AUTO: 13.61 10*3/MM3 (ref 3.4–10.8)
WBC # BLD AUTO: 15.01 10*3/MM3 (ref 3.4–10.8)
WBC # BLD AUTO: 15.79 10*3/MM3 (ref 3.4–10.8)
WBC # BLD AUTO: 16.04 10*3/MM3 (ref 3.4–10.8)
WBC # BLD AUTO: 16.28 10*3/MM3 (ref 3.4–10.8)
WBC # BLD AUTO: 16.91 10*3/MM3 (ref 3.4–10.8)
WBC # BLD AUTO: 17.23 10*3/MM3 (ref 3.4–10.8)
WBC # BLD AUTO: 17.45 10*3/MM3 (ref 3.4–10.8)
WBC # BLD AUTO: 17.95 10*3/MM3 (ref 3.4–10.8)
WBC # BLD AUTO: 20.19 10*3/MM3 (ref 3.4–10.8)
WBC # BLD AUTO: 20.41 10*3/MM3 (ref 3.4–10.8)
WBC # BLD AUTO: 21.32 10*3/MM3 (ref 3.4–10.8)
WBC # BLD AUTO: 21.91 10*3/MM3 (ref 3.4–10.8)
WBC # BLD AUTO: 23.02 10*3/MM3 (ref 3.4–10.8)
WBC # BLD AUTO: 23.12 10*3/MM3 (ref 3.4–10.8)
WBC # BLD AUTO: 24.19 10*3/MM3 (ref 3.4–10.8)
WBC # BLD AUTO: 24.65 10*3/MM3 (ref 3.4–10.8)
WBC # BLD AUTO: 24.96 10*3/MM3 (ref 3.4–10.8)
WBC # BLD AUTO: 8.88 10*3/MM3 (ref 3.4–10.8)
WBC # BLD AUTO: 9.19 10*3/MM3 (ref 3.4–10.8)
WBC MORPH BLD: NORMAL
WBC UR QL AUTO: ABNORMAL /HPF

## 2020-01-01 PROCEDURE — 84145 PROCALCITONIN (PCT): CPT | Performed by: EMERGENCY MEDICINE

## 2020-01-01 PROCEDURE — 82248 BILIRUBIN DIRECT: CPT | Performed by: FAMILY MEDICINE

## 2020-01-01 PROCEDURE — 63710000001 INSULIN LISPRO (HUMAN) PER 5 UNITS: Performed by: FAMILY MEDICINE

## 2020-01-01 PROCEDURE — 36600 WITHDRAWAL OF ARTERIAL BLOOD: CPT

## 2020-01-01 PROCEDURE — 94799 UNLISTED PULMONARY SVC/PX: CPT

## 2020-01-01 PROCEDURE — 36430 TRANSFUSION BLD/BLD COMPNT: CPT

## 2020-01-01 PROCEDURE — 85379 FIBRIN DEGRADATION QUANT: CPT | Performed by: EMERGENCY MEDICINE

## 2020-01-01 PROCEDURE — 82803 BLOOD GASES ANY COMBINATION: CPT

## 2020-01-01 PROCEDURE — 82550 ASSAY OF CK (CPK): CPT | Performed by: FAMILY MEDICINE

## 2020-01-01 PROCEDURE — 63710000001 INSULIN LISPRO (HUMAN) PER 5 UNITS: Performed by: INTERNAL MEDICINE

## 2020-01-01 PROCEDURE — 99497 ADVNCD CARE PLAN 30 MIN: CPT | Performed by: CLINICAL NURSE SPECIALIST

## 2020-01-01 PROCEDURE — 85018 HEMOGLOBIN: CPT | Performed by: INTERNAL MEDICINE

## 2020-01-01 PROCEDURE — 63710000001 INSULIN DETEMIR PER 5 UNITS: Performed by: INTERNAL MEDICINE

## 2020-01-01 PROCEDURE — 82962 GLUCOSE BLOOD TEST: CPT

## 2020-01-01 PROCEDURE — 25010000002 DEXAMETHASONE PER 1 MG: Performed by: FAMILY MEDICINE

## 2020-01-01 PROCEDURE — 82728 ASSAY OF FERRITIN: CPT | Performed by: FAMILY MEDICINE

## 2020-01-01 PROCEDURE — 85025 COMPLETE CBC W/AUTO DIFF WBC: CPT | Performed by: FAMILY MEDICINE

## 2020-01-01 PROCEDURE — C9803 HOPD COVID-19 SPEC COLLECT: HCPCS

## 2020-01-01 PROCEDURE — 25010000002 ENOXAPARIN PER 10 MG: Performed by: FAMILY MEDICINE

## 2020-01-01 PROCEDURE — 25010000002 HYDROMORPHONE 1 MG/ML SOLUTION

## 2020-01-01 PROCEDURE — 80053 COMPREHEN METABOLIC PANEL: CPT | Performed by: FAMILY MEDICINE

## 2020-01-01 PROCEDURE — 87150 DNA/RNA AMPLIFIED PROBE: CPT | Performed by: INTERNAL MEDICINE

## 2020-01-01 PROCEDURE — 63710000001 INSULIN REGULAR HUMAN PER 5 UNITS: Performed by: INTERNAL MEDICINE

## 2020-01-01 PROCEDURE — 25010000002 DEXAMETHASONE PER 1 MG: Performed by: INTERNAL MEDICINE

## 2020-01-01 PROCEDURE — 05HY33Z INSERTION OF INFUSION DEVICE INTO UPPER VEIN, PERCUTANEOUS APPROACH: ICD-10-PCS | Performed by: FAMILY MEDICINE

## 2020-01-01 PROCEDURE — 25010000002 CALCIUM GLUCONATE PER 10 ML: Performed by: INTERNAL MEDICINE

## 2020-01-01 PROCEDURE — 72072 X-RAY EXAM THORAC SPINE 3VWS: CPT

## 2020-01-01 PROCEDURE — 25010000002 DEXAMETHASONE SODIUM PHOSPHATE 10 MG/ML SOLUTION: Performed by: NURSE PRACTITIONER

## 2020-01-01 PROCEDURE — 94003 VENT MGMT INPAT SUBQ DAY: CPT

## 2020-01-01 PROCEDURE — 36415 COLL VENOUS BLD VENIPUNCTURE: CPT | Performed by: FAMILY MEDICINE

## 2020-01-01 PROCEDURE — 81003 URINALYSIS AUTO W/O SCOPE: CPT | Performed by: EMERGENCY MEDICINE

## 2020-01-01 PROCEDURE — 94770: CPT

## 2020-01-01 PROCEDURE — 81001 URINALYSIS AUTO W/SCOPE: CPT | Performed by: INTERNAL MEDICINE

## 2020-01-01 PROCEDURE — 99233 SBSQ HOSP IP/OBS HIGH 50: CPT | Performed by: INTERNAL MEDICINE

## 2020-01-01 PROCEDURE — 83880 ASSAY OF NATRIURETIC PEPTIDE: CPT | Performed by: NURSE PRACTITIONER

## 2020-01-01 PROCEDURE — 82550 ASSAY OF CK (CPK): CPT | Performed by: INTERNAL MEDICINE

## 2020-01-01 PROCEDURE — 94660 CPAP INITIATION&MGMT: CPT

## 2020-01-01 PROCEDURE — 86900 BLOOD TYPING SEROLOGIC ABO: CPT | Performed by: FAMILY MEDICINE

## 2020-01-01 PROCEDURE — 85384 FIBRINOGEN ACTIVITY: CPT | Performed by: FAMILY MEDICINE

## 2020-01-01 PROCEDURE — 86140 C-REACTIVE PROTEIN: CPT | Performed by: INTERNAL MEDICINE

## 2020-01-01 PROCEDURE — 25010000002 DEXAMETHASONE PER 1 MG: Performed by: NURSE PRACTITIONER

## 2020-01-01 PROCEDURE — 25010000002 FENTANYL CITRATE (PF) 100 MCG/2ML SOLUTION 50 ML VIAL: Performed by: FAMILY MEDICINE

## 2020-01-01 PROCEDURE — 25010000002 METOCLOPRAMIDE PER 10 MG: Performed by: FAMILY MEDICINE

## 2020-01-01 PROCEDURE — 87899 AGENT NOS ASSAY W/OPTIC: CPT | Performed by: FAMILY MEDICINE

## 2020-01-01 PROCEDURE — 83880 ASSAY OF NATRIURETIC PEPTIDE: CPT | Performed by: EMERGENCY MEDICINE

## 2020-01-01 PROCEDURE — 84443 ASSAY THYROID STIM HORMONE: CPT | Performed by: FAMILY MEDICINE

## 2020-01-01 PROCEDURE — 84484 ASSAY OF TROPONIN QUANT: CPT | Performed by: EMERGENCY MEDICINE

## 2020-01-01 PROCEDURE — 85007 BL SMEAR W/DIFF WBC COUNT: CPT | Performed by: INTERNAL MEDICINE

## 2020-01-01 PROCEDURE — 5A1955Z RESPIRATORY VENTILATION, GREATER THAN 96 CONSECUTIVE HOURS: ICD-10-PCS | Performed by: FAMILY MEDICINE

## 2020-01-01 PROCEDURE — 71045 X-RAY EXAM CHEST 1 VIEW: CPT

## 2020-01-01 PROCEDURE — 25010000002 MEROPENEM PER 100 MG: Performed by: EMERGENCY MEDICINE

## 2020-01-01 PROCEDURE — 86901 BLOOD TYPING SEROLOGIC RH(D): CPT | Performed by: INTERNAL MEDICINE

## 2020-01-01 PROCEDURE — 85610 PROTHROMBIN TIME: CPT | Performed by: EMERGENCY MEDICINE

## 2020-01-01 PROCEDURE — 82728 ASSAY OF FERRITIN: CPT | Performed by: INTERNAL MEDICINE

## 2020-01-01 PROCEDURE — 25010000002 FUROSEMIDE PER 20 MG: Performed by: FAMILY MEDICINE

## 2020-01-01 PROCEDURE — 86140 C-REACTIVE PROTEIN: CPT | Performed by: FAMILY MEDICINE

## 2020-01-01 PROCEDURE — 25010000002 LORAZEPAM PER 2 MG

## 2020-01-01 PROCEDURE — P9016 RBC LEUKOCYTES REDUCED: HCPCS

## 2020-01-01 PROCEDURE — 25010000002 FUROSEMIDE PER 20 MG: Performed by: INTERNAL MEDICINE

## 2020-01-01 PROCEDURE — 94640 AIRWAY INHALATION TREATMENT: CPT

## 2020-01-01 PROCEDURE — 87040 BLOOD CULTURE FOR BACTERIA: CPT | Performed by: INTERNAL MEDICINE

## 2020-01-01 PROCEDURE — 80061 LIPID PANEL: CPT | Performed by: FAMILY MEDICINE

## 2020-01-01 PROCEDURE — 25010000002 FUROSEMIDE PER 20 MG: Performed by: NURSE PRACTITIONER

## 2020-01-01 PROCEDURE — 84145 PROCALCITONIN (PCT): CPT | Performed by: INTERNAL MEDICINE

## 2020-01-01 PROCEDURE — 86850 RBC ANTIBODY SCREEN: CPT | Performed by: INTERNAL MEDICINE

## 2020-01-01 PROCEDURE — 94002 VENT MGMT INPAT INIT DAY: CPT

## 2020-01-01 PROCEDURE — 86850 RBC ANTIBODY SCREEN: CPT | Performed by: FAMILY MEDICINE

## 2020-01-01 PROCEDURE — 25010000002 LORAZEPAM PER 2 MG: Performed by: FAMILY MEDICINE

## 2020-01-01 PROCEDURE — 85379 FIBRIN DEGRADATION QUANT: CPT | Performed by: FAMILY MEDICINE

## 2020-01-01 PROCEDURE — 86901 BLOOD TYPING SEROLOGIC RH(D): CPT | Performed by: FAMILY MEDICINE

## 2020-01-01 PROCEDURE — 72110 X-RAY EXAM L-2 SPINE 4/>VWS: CPT

## 2020-01-01 PROCEDURE — 83615 LACTATE (LD) (LDH) ENZYME: CPT | Performed by: FAMILY MEDICINE

## 2020-01-01 PROCEDURE — 06HY33Z INSERTION OF INFUSION DEVICE INTO LOWER VEIN, PERCUTANEOUS APPROACH: ICD-10-PCS | Performed by: INTERNAL MEDICINE

## 2020-01-01 PROCEDURE — 93010 ELECTROCARDIOGRAM REPORT: CPT | Performed by: INTERNAL MEDICINE

## 2020-01-01 PROCEDURE — 0BH18EZ INSERTION OF ENDOTRACHEAL AIRWAY INTO TRACHEA, VIA NATURAL OR ARTIFICIAL OPENING ENDOSCOPIC: ICD-10-PCS | Performed by: FAMILY MEDICINE

## 2020-01-01 PROCEDURE — 87077 CULTURE AEROBIC IDENTIFY: CPT | Performed by: INTERNAL MEDICINE

## 2020-01-01 PROCEDURE — 99497 ADVNCD CARE PLAN 30 MIN: CPT | Performed by: INTERNAL MEDICINE

## 2020-01-01 PROCEDURE — XW13325 TRANSFUSION OF CONVALESCENT PLASMA (NONAUTOLOGOUS) INTO PERIPHERAL VEIN, PERCUTANEOUS APPROACH, NEW TECHNOLOGY GROUP 5: ICD-10-PCS | Performed by: FAMILY MEDICINE

## 2020-01-01 PROCEDURE — 84439 ASSAY OF FREE THYROXINE: CPT | Performed by: FAMILY MEDICINE

## 2020-01-01 PROCEDURE — 85007 BL SMEAR W/DIFF WBC COUNT: CPT | Performed by: FAMILY MEDICINE

## 2020-01-01 PROCEDURE — 85018 HEMOGLOBIN: CPT | Performed by: FAMILY MEDICINE

## 2020-01-01 PROCEDURE — 85379 FIBRIN DEGRADATION QUANT: CPT | Performed by: INTERNAL MEDICINE

## 2020-01-01 PROCEDURE — 31500 INSERT EMERGENCY AIRWAY: CPT | Performed by: FAMILY MEDICINE

## 2020-01-01 PROCEDURE — 93306 TTE W/DOPPLER COMPLETE: CPT

## 2020-01-01 PROCEDURE — 86927 PLASMA FRESH FROZEN: CPT

## 2020-01-01 PROCEDURE — 25010000002 LINEZOLID 600 MG/300ML SOLUTION: Performed by: INTERNAL MEDICINE

## 2020-01-01 PROCEDURE — 87186 SC STD MICRODIL/AGAR DIL: CPT | Performed by: INTERNAL MEDICINE

## 2020-01-01 PROCEDURE — 25010000002 SUCCINYLCHOLINE PER 20 MG

## 2020-01-01 PROCEDURE — 36415 COLL VENOUS BLD VENIPUNCTURE: CPT | Performed by: INTERNAL MEDICINE

## 2020-01-01 PROCEDURE — 99222 1ST HOSP IP/OBS MODERATE 55: CPT | Performed by: INTERNAL MEDICINE

## 2020-01-01 PROCEDURE — 85025 COMPLETE CBC W/AUTO DIFF WBC: CPT | Performed by: INTERNAL MEDICINE

## 2020-01-01 PROCEDURE — 25010000002 HALOPERIDOL LACTATE PER 5 MG: Performed by: FAMILY MEDICINE

## 2020-01-01 PROCEDURE — 85027 COMPLETE CBC AUTOMATED: CPT | Performed by: FAMILY MEDICINE

## 2020-01-01 PROCEDURE — 85384 FIBRINOGEN ACTIVITY: CPT | Performed by: INTERNAL MEDICINE

## 2020-01-01 PROCEDURE — 87040 BLOOD CULTURE FOR BACTERIA: CPT | Performed by: EMERGENCY MEDICINE

## 2020-01-01 PROCEDURE — 93971 EXTREMITY STUDY: CPT

## 2020-01-01 PROCEDURE — 85025 COMPLETE CBC W/AUTO DIFF WBC: CPT | Performed by: EMERGENCY MEDICINE

## 2020-01-01 PROCEDURE — 85060 BLOOD SMEAR INTERPRETATION: CPT | Performed by: INTERNAL MEDICINE

## 2020-01-01 PROCEDURE — 99231 SBSQ HOSP IP/OBS SF/LOW 25: CPT | Performed by: INTERNAL MEDICINE

## 2020-01-01 PROCEDURE — 0 IOPAMIDOL PER 1 ML: Performed by: FAMILY MEDICINE

## 2020-01-01 PROCEDURE — 99233 SBSQ HOSP IP/OBS HIGH 50: CPT | Performed by: CLINICAL NURSE SPECIALIST

## 2020-01-01 PROCEDURE — 25010000002 ENOXAPARIN PER 10 MG: Performed by: EMERGENCY MEDICINE

## 2020-01-01 PROCEDURE — 85610 PROTHROMBIN TIME: CPT | Performed by: INTERNAL MEDICINE

## 2020-01-01 PROCEDURE — 25010000002 MORPHINE SULFATE (PF) 2 MG/ML SOLUTION: Performed by: INTERNAL MEDICINE

## 2020-01-01 PROCEDURE — XW033E5 INTRODUCTION OF REMDESIVIR ANTI-INFECTIVE INTO PERIPHERAL VEIN, PERCUTANEOUS APPROACH, NEW TECHNOLOGY GROUP 5: ICD-10-PCS | Performed by: FAMILY MEDICINE

## 2020-01-01 PROCEDURE — 25010000002 MIDAZOLAM 50 MG/10ML SOLUTION 10 ML VIAL: Performed by: INTERNAL MEDICINE

## 2020-01-01 PROCEDURE — 99232 SBSQ HOSP IP/OBS MODERATE 35: CPT | Performed by: INTERNAL MEDICINE

## 2020-01-01 PROCEDURE — 25010000002 FENTANYL CITRATE (PF) 100 MCG/2ML SOLUTION 5 ML AMPULE: Performed by: FAMILY MEDICINE

## 2020-01-01 PROCEDURE — 71101 X-RAY EXAM UNILAT RIBS/CHEST: CPT

## 2020-01-01 PROCEDURE — P9047 ALBUMIN (HUMAN), 25%, 50ML: HCPCS | Performed by: FAMILY MEDICINE

## 2020-01-01 PROCEDURE — 87181 SC STD AGAR DILUTION PER AGT: CPT | Performed by: INTERNAL MEDICINE

## 2020-01-01 PROCEDURE — 80048 BASIC METABOLIC PNL TOTAL CA: CPT | Performed by: INTERNAL MEDICINE

## 2020-01-01 PROCEDURE — 25010000002 DEXAMETHASONE SODIUM PHOSPHATE 10 MG/ML SOLUTION: Performed by: EMERGENCY MEDICINE

## 2020-01-01 PROCEDURE — 25010000002 HALOPERIDOL LACTATE PER 5 MG: Performed by: CLINICAL NURSE SPECIALIST

## 2020-01-01 PROCEDURE — 82272 OCCULT BLD FECES 1-3 TESTS: CPT | Performed by: FAMILY MEDICINE

## 2020-01-01 PROCEDURE — 99283 EMERGENCY DEPT VISIT LOW MDM: CPT

## 2020-01-01 PROCEDURE — 83735 ASSAY OF MAGNESIUM: CPT | Performed by: INTERNAL MEDICINE

## 2020-01-01 PROCEDURE — 86923 COMPATIBILITY TEST ELECTRIC: CPT

## 2020-01-01 PROCEDURE — 74018 RADEX ABDOMEN 1 VIEW: CPT

## 2020-01-01 PROCEDURE — 87641 MR-STAPH DNA AMP PROBE: CPT | Performed by: INTERNAL MEDICINE

## 2020-01-01 PROCEDURE — 25010000002 FENTANYL CITRATE (PF) 100 MCG/2ML SOLUTION 5 ML VIAL: Performed by: FAMILY MEDICINE

## 2020-01-01 PROCEDURE — 71275 CT ANGIOGRAPHY CHEST: CPT

## 2020-01-01 PROCEDURE — 93306 TTE W/DOPPLER COMPLETE: CPT | Performed by: INTERNAL MEDICINE

## 2020-01-01 PROCEDURE — 25010000003 POTASSIUM CHLORIDE PER 2 MEQ: Performed by: INTERNAL MEDICINE

## 2020-01-01 PROCEDURE — 83605 ASSAY OF LACTIC ACID: CPT | Performed by: EMERGENCY MEDICINE

## 2020-01-01 PROCEDURE — 3E0336Z INTRODUCTION OF NUTRITIONAL SUBSTANCE INTO PERIPHERAL VEIN, PERCUTANEOUS APPROACH: ICD-10-PCS | Performed by: FAMILY MEDICINE

## 2020-01-01 PROCEDURE — 63710000001 ONDANSETRON ODT 4 MG TABLET DISPERSIBLE: Performed by: NURSE PRACTITIONER

## 2020-01-01 PROCEDURE — 80053 COMPREHEN METABOLIC PANEL: CPT | Performed by: EMERGENCY MEDICINE

## 2020-01-01 PROCEDURE — 93971 EXTREMITY STUDY: CPT | Performed by: SURGERY

## 2020-01-01 PROCEDURE — 86900 BLOOD TYPING SEROLOGIC ABO: CPT | Performed by: INTERNAL MEDICINE

## 2020-01-01 PROCEDURE — 93000 ELECTROCARDIOGRAM COMPLETE: CPT | Performed by: INTERNAL MEDICINE

## 2020-01-01 PROCEDURE — 86900 BLOOD TYPING SEROLOGIC ABO: CPT

## 2020-01-01 PROCEDURE — P9612 CATHETERIZE FOR URINE SPEC: HCPCS

## 2020-01-01 PROCEDURE — 99285 EMERGENCY DEPT VISIT HI MDM: CPT

## 2020-01-01 PROCEDURE — 85730 THROMBOPLASTIN TIME PARTIAL: CPT | Performed by: INTERNAL MEDICINE

## 2020-01-01 PROCEDURE — 97165 OT EVAL LOW COMPLEX 30 MIN: CPT

## 2020-01-01 PROCEDURE — 93005 ELECTROCARDIOGRAM TRACING: CPT | Performed by: EMERGENCY MEDICINE

## 2020-01-01 PROCEDURE — 99232 SBSQ HOSP IP/OBS MODERATE 35: CPT | Performed by: CLINICAL NURSE SPECIALIST

## 2020-01-01 PROCEDURE — 99213 OFFICE O/P EST LOW 20 MIN: CPT | Performed by: INTERNAL MEDICINE

## 2020-01-01 PROCEDURE — 85730 THROMBOPLASTIN TIME PARTIAL: CPT | Performed by: EMERGENCY MEDICINE

## 2020-01-01 PROCEDURE — 85014 HEMATOCRIT: CPT | Performed by: FAMILY MEDICINE

## 2020-01-01 PROCEDURE — 25010000002 ALBUMIN HUMAN 25% PER 50 ML: Performed by: FAMILY MEDICINE

## 2020-01-01 PROCEDURE — 85027 COMPLETE CBC AUTOMATED: CPT | Performed by: INTERNAL MEDICINE

## 2020-01-01 PROCEDURE — 99223 1ST HOSP IP/OBS HIGH 75: CPT | Performed by: CLINICAL NURSE SPECIALIST

## 2020-01-01 PROCEDURE — 83036 HEMOGLOBIN GLYCOSYLATED A1C: CPT | Performed by: FAMILY MEDICINE

## 2020-01-01 PROCEDURE — 87635 SARS-COV-2 COVID-19 AMP PRB: CPT

## 2020-01-01 PROCEDURE — 97163 PT EVAL HIGH COMPLEX 45 MIN: CPT

## 2020-01-01 PROCEDURE — 85014 HEMATOCRIT: CPT | Performed by: INTERNAL MEDICINE

## 2020-01-01 RX ORDER — LINEZOLID 2 MG/ML
600 INJECTION, SOLUTION INTRAVENOUS EVERY 12 HOURS SCHEDULED
Status: DISCONTINUED | OUTPATIENT
Start: 2020-01-01 | End: 2020-01-01

## 2020-01-01 RX ORDER — FUROSEMIDE 10 MG/ML
20 INJECTION INTRAMUSCULAR; INTRAVENOUS ONCE
Status: COMPLETED | OUTPATIENT
Start: 2020-01-01 | End: 2020-01-01

## 2020-01-01 RX ORDER — SODIUM POLYSTYRENE SULFONATE 15 G/60ML
15 SUSPENSION ORAL; RECTAL ONCE
Status: COMPLETED | OUTPATIENT
Start: 2020-01-01 | End: 2020-01-01

## 2020-01-01 RX ORDER — ACETAMINOPHEN 325 MG/1
650 TABLET ORAL EVERY 4 HOURS PRN
Status: DISCONTINUED | OUTPATIENT
Start: 2020-01-01 | End: 2020-01-01 | Stop reason: HOSPADM

## 2020-01-01 RX ORDER — POTASSIUM CHLORIDE 29.8 MG/ML
20 INJECTION INTRAVENOUS
Status: DISCONTINUED | OUTPATIENT
Start: 2020-01-01 | End: 2020-01-01

## 2020-01-01 RX ORDER — LEVOTHYROXINE SODIUM 88 UG/1
88 TABLET ORAL
Status: DISCONTINUED | OUTPATIENT
Start: 2020-01-01 | End: 2020-01-01

## 2020-01-01 RX ORDER — BISACODYL 10 MG
10 SUPPOSITORY, RECTAL RECTAL DAILY
Status: DISCONTINUED | OUTPATIENT
Start: 2020-01-01 | End: 2020-01-01

## 2020-01-01 RX ORDER — ATORVASTATIN CALCIUM 10 MG/1
10 TABLET, FILM COATED ORAL NIGHTLY
Status: DISCONTINUED | OUTPATIENT
Start: 2020-01-01 | End: 2020-01-01

## 2020-01-01 RX ORDER — GUAR GUM
1 PACKET (EA) ORAL 3 TIMES DAILY
Status: DISCONTINUED | OUTPATIENT
Start: 2020-01-01 | End: 2020-01-01

## 2020-01-01 RX ORDER — LEVOTHYROXINE SODIUM 0.1 MG/1
100 TABLET ORAL DAILY
Status: DISCONTINUED | OUTPATIENT
Start: 2020-01-01 | End: 2020-01-01

## 2020-01-01 RX ORDER — LACTULOSE 20 G/30ML
20 SOLUTION ORAL ONCE
Status: COMPLETED | OUTPATIENT
Start: 2020-01-01 | End: 2020-01-01

## 2020-01-01 RX ORDER — ALBUTEROL SULFATE 90 UG/1
2 AEROSOL, METERED RESPIRATORY (INHALATION) EVERY 6 HOURS PRN
Status: DISCONTINUED | OUTPATIENT
Start: 2020-01-01 | End: 2020-01-01 | Stop reason: HOSPADM

## 2020-01-01 RX ORDER — PANTOPRAZOLE SODIUM 40 MG/10ML
40 INJECTION, POWDER, LYOPHILIZED, FOR SOLUTION INTRAVENOUS 2 TIMES DAILY
Status: DISCONTINUED | OUTPATIENT
Start: 2020-01-01 | End: 2020-01-01

## 2020-01-01 RX ORDER — DEXTROSE MONOHYDRATE 50 MG/ML
50 INJECTION, SOLUTION INTRAVENOUS CONTINUOUS
Status: DISCONTINUED | OUTPATIENT
Start: 2020-01-01 | End: 2020-01-01

## 2020-01-01 RX ORDER — HALOPERIDOL 2 MG/1
2 TABLET ORAL EVERY 4 HOURS PRN
Status: DISCONTINUED | OUTPATIENT
Start: 2020-01-01 | End: 2020-01-01 | Stop reason: HOSPADM

## 2020-01-01 RX ORDER — AZITHROMYCIN 250 MG/1
TABLET, FILM COATED ORAL
Qty: 6 TABLET | Refills: 0 | Status: ON HOLD | OUTPATIENT
Start: 2020-01-01 | End: 2020-01-01

## 2020-01-01 RX ORDER — DEXTROSE MONOHYDRATE 25 G/50ML
25 INJECTION, SOLUTION INTRAVENOUS
Status: DISCONTINUED | OUTPATIENT
Start: 2020-01-01 | End: 2020-01-01

## 2020-01-01 RX ORDER — DEXMEDETOMIDINE HYDROCHLORIDE 4 UG/ML
.2-1.5 INJECTION, SOLUTION INTRAVENOUS
Status: DISCONTINUED | OUTPATIENT
Start: 2020-01-01 | End: 2020-01-01

## 2020-01-01 RX ORDER — METOPROLOL TARTRATE 5 MG/5ML
2.5 INJECTION INTRAVENOUS EVERY 4 HOURS
Status: DISCONTINUED | OUTPATIENT
Start: 2020-01-01 | End: 2020-01-01

## 2020-01-01 RX ORDER — HALOPERIDOL 5 MG/ML
1 INJECTION INTRAMUSCULAR
Status: DISCONTINUED | OUTPATIENT
Start: 2020-01-01 | End: 2020-01-01

## 2020-01-01 RX ORDER — LORAZEPAM 2 MG/ML
INJECTION INTRAMUSCULAR
Status: COMPLETED
Start: 2020-01-01 | End: 2020-01-01

## 2020-01-01 RX ORDER — GLIPIZIDE 5 MG/1
2.5 TABLET ORAL
Status: DISCONTINUED | OUTPATIENT
Start: 2020-01-01 | End: 2020-01-01

## 2020-01-01 RX ORDER — CLINDAMYCIN PHOSPHATE 600 MG/50ML
600 INJECTION INTRAVENOUS EVERY 8 HOURS
Status: DISCONTINUED | OUTPATIENT
Start: 2020-01-01 | End: 2020-01-01

## 2020-01-01 RX ORDER — LORAZEPAM 2 MG/ML
1 INJECTION INTRAMUSCULAR EVERY 4 HOURS PRN
Status: DISPENSED | OUTPATIENT
Start: 2020-01-01 | End: 2020-01-01

## 2020-01-01 RX ORDER — POTASSIUM CHLORIDE 20MEQ/15ML
40 LIQUID (ML) ORAL EVERY 6 HOURS
Status: COMPLETED | OUTPATIENT
Start: 2020-01-01 | End: 2020-01-01

## 2020-01-01 RX ORDER — DEXAMETHASONE SODIUM PHOSPHATE 4 MG/ML
4 INJECTION, SOLUTION INTRA-ARTICULAR; INTRALESIONAL; INTRAMUSCULAR; INTRAVENOUS; SOFT TISSUE DAILY
Status: DISCONTINUED | OUTPATIENT
Start: 2020-01-01 | End: 2020-01-01

## 2020-01-01 RX ORDER — IPRATROPIUM BROMIDE AND ALBUTEROL SULFATE 2.5; .5 MG/3ML; MG/3ML
3 SOLUTION RESPIRATORY (INHALATION) EVERY 6 HOURS PRN
Status: DISCONTINUED | OUTPATIENT
Start: 2020-01-01 | End: 2020-01-01

## 2020-01-01 RX ORDER — BENZONATATE 100 MG/1
100 CAPSULE ORAL 3 TIMES DAILY PRN
Status: DISCONTINUED | OUTPATIENT
Start: 2020-01-01 | End: 2020-01-01

## 2020-01-01 RX ORDER — CHOLECALCIFEROL (VITAMIN D3) 125 MCG
1000 CAPSULE ORAL DAILY
Status: DISCONTINUED | OUTPATIENT
Start: 2020-01-01 | End: 2020-01-01

## 2020-01-01 RX ORDER — FAMOTIDINE 10 MG/ML
20 INJECTION, SOLUTION INTRAVENOUS DAILY
Status: DISCONTINUED | OUTPATIENT
Start: 2020-01-01 | End: 2020-01-01

## 2020-01-01 RX ORDER — SODIUM CHLORIDE 9 MG/ML
30 INJECTION, SOLUTION INTRAVENOUS CONTINUOUS
Status: DISCONTINUED | OUTPATIENT
Start: 2020-01-01 | End: 2020-01-01

## 2020-01-01 RX ORDER — ONDANSETRON 4 MG/1
4 TABLET, ORALLY DISINTEGRATING ORAL ONCE
Status: COMPLETED | OUTPATIENT
Start: 2020-01-01 | End: 2020-01-01

## 2020-01-01 RX ORDER — FUROSEMIDE 10 MG/ML
20 INJECTION INTRAMUSCULAR; INTRAVENOUS EVERY 12 HOURS SCHEDULED
Status: DISCONTINUED | OUTPATIENT
Start: 2020-01-01 | End: 2020-01-01

## 2020-01-01 RX ORDER — FAMOTIDINE 10 MG/ML
20 INJECTION, SOLUTION INTRAVENOUS EVERY 12 HOURS SCHEDULED
Status: DISCONTINUED | OUTPATIENT
Start: 2020-01-01 | End: 2020-01-01

## 2020-01-01 RX ORDER — HYDROMORPHONE HYDROCHLORIDE 1 MG/ML
0.5 INJECTION, SOLUTION INTRAMUSCULAR; INTRAVENOUS; SUBCUTANEOUS ONCE
Status: COMPLETED | OUTPATIENT
Start: 2020-01-01 | End: 2020-01-01

## 2020-01-01 RX ORDER — DIPHENOXYLATE HYDROCHLORIDE AND ATROPINE SULFATE 2.5; .025 MG/1; MG/1
1 TABLET ORAL
Status: DISCONTINUED | OUTPATIENT
Start: 2020-01-01 | End: 2020-01-01 | Stop reason: HOSPADM

## 2020-01-01 RX ORDER — DEXTROMETHORPHAN POLISTIREX 30 MG/5ML
10 SUSPENSION ORAL EVERY 12 HOURS PRN
Status: DISCONTINUED | OUTPATIENT
Start: 2020-01-01 | End: 2020-01-01

## 2020-01-01 RX ORDER — MORPHINE SULFATE 2 MG/ML
2 INJECTION, SOLUTION INTRAMUSCULAR; INTRAVENOUS EVERY 4 HOURS PRN
Status: DISPENSED | OUTPATIENT
Start: 2020-01-01 | End: 2020-01-01

## 2020-01-01 RX ORDER — HALOPERIDOL 5 MG/ML
1 INJECTION INTRAMUSCULAR ONCE
Status: COMPLETED | OUTPATIENT
Start: 2020-01-01 | End: 2020-01-01

## 2020-01-01 RX ORDER — HALOPERIDOL 5 MG/ML
2 INJECTION INTRAMUSCULAR EVERY 4 HOURS PRN
Status: DISCONTINUED | OUTPATIENT
Start: 2020-01-01 | End: 2020-01-01 | Stop reason: HOSPADM

## 2020-01-01 RX ORDER — HYDROCODONE BITARTRATE AND ACETAMINOPHEN 5; 325 MG/1; MG/1
1 TABLET ORAL EVERY 8 HOURS PRN
Qty: 10 TABLET | Refills: 0 | Status: ON HOLD | OUTPATIENT
Start: 2020-01-01 | End: 2020-01-01

## 2020-01-01 RX ORDER — MELATONIN
2000 DAILY
Status: DISCONTINUED | OUTPATIENT
Start: 2020-01-01 | End: 2020-01-01

## 2020-01-01 RX ORDER — DEXAMETHASONE SODIUM PHOSPHATE 10 MG/ML
6 INJECTION, SOLUTION INTRAMUSCULAR; INTRAVENOUS DAILY
Status: DISCONTINUED | OUTPATIENT
Start: 2020-01-01 | End: 2020-01-01

## 2020-01-01 RX ORDER — ATORVASTATIN CALCIUM 10 MG/1
10 TABLET, FILM COATED ORAL DAILY
COMMUNITY

## 2020-01-01 RX ORDER — DEXAMETHASONE SODIUM PHOSPHATE 10 MG/ML
10 INJECTION, SOLUTION INTRAMUSCULAR; INTRAVENOUS ONCE
Status: COMPLETED | OUTPATIENT
Start: 2020-01-01 | End: 2020-01-01

## 2020-01-01 RX ORDER — HALOPERIDOL 2 MG/ML
2 SOLUTION ORAL EVERY 4 HOURS PRN
Status: DISCONTINUED | OUTPATIENT
Start: 2020-01-01 | End: 2020-01-01 | Stop reason: HOSPADM

## 2020-01-01 RX ORDER — BUDESONIDE AND FORMOTEROL FUMARATE DIHYDRATE 160; 4.5 UG/1; UG/1
2 AEROSOL RESPIRATORY (INHALATION)
Status: DISCONTINUED | OUTPATIENT
Start: 2020-01-01 | End: 2020-01-01

## 2020-01-01 RX ORDER — METOCLOPRAMIDE HYDROCHLORIDE 5 MG/ML
5 INJECTION INTRAMUSCULAR; INTRAVENOUS EVERY 6 HOURS SCHEDULED
Status: DISCONTINUED | OUTPATIENT
Start: 2020-01-01 | End: 2020-01-01

## 2020-01-01 RX ORDER — LEVOTHYROXINE SODIUM 88 UG/1
88 TABLET ORAL DAILY
COMMUNITY

## 2020-01-01 RX ORDER — GLYCOPYRROLATE 0.2 MG/ML
0.4 INJECTION INTRAMUSCULAR; INTRAVENOUS
Status: DISCONTINUED | OUTPATIENT
Start: 2020-01-01 | End: 2020-01-01 | Stop reason: HOSPADM

## 2020-01-01 RX ORDER — CLINDAMYCIN PHOSPHATE 900 MG/50ML
900 INJECTION INTRAVENOUS EVERY 8 HOURS
Status: DISCONTINUED | OUTPATIENT
Start: 2020-01-01 | End: 2020-01-01

## 2020-01-01 RX ORDER — HYDROMORPHONE HCL 110MG/55ML
2 PATIENT CONTROLLED ANALGESIA SYRINGE INTRAVENOUS ONCE
Status: DISCONTINUED | OUTPATIENT
Start: 2020-01-01 | End: 2020-01-01

## 2020-01-01 RX ORDER — ASCORBIC ACID 500 MG
500 TABLET ORAL 2 TIMES DAILY
Status: DISCONTINUED | OUTPATIENT
Start: 2020-01-01 | End: 2020-01-01

## 2020-01-01 RX ORDER — ACETAMINOPHEN 650 MG/1
650 SUPPOSITORY RECTAL EVERY 4 HOURS PRN
Status: DISCONTINUED | OUTPATIENT
Start: 2020-01-01 | End: 2020-01-01 | Stop reason: HOSPADM

## 2020-01-01 RX ORDER — ZINC SULFATE 50(220)MG
220 CAPSULE ORAL DAILY
Status: DISCONTINUED | OUTPATIENT
Start: 2020-01-01 | End: 2020-01-01

## 2020-01-01 RX ORDER — DILTIAZEM HYDROCHLORIDE 60 MG/1
60 TABLET, FILM COATED ORAL EVERY 6 HOURS SCHEDULED
Status: DISCONTINUED | OUTPATIENT
Start: 2020-01-01 | End: 2020-01-01

## 2020-01-01 RX ORDER — ALBUMIN (HUMAN) 12.5 G/50ML
25 SOLUTION INTRAVENOUS ONCE
Status: COMPLETED | OUTPATIENT
Start: 2020-01-01 | End: 2020-01-01

## 2020-01-01 RX ORDER — LANOLIN ALCOHOL/MO/W.PET/CERES
3 CREAM (GRAM) TOPICAL NIGHTLY
Status: DISCONTINUED | OUTPATIENT
Start: 2020-01-01 | End: 2020-01-01

## 2020-01-01 RX ORDER — DEXAMETHASONE SODIUM PHOSPHATE 4 MG/ML
2 INJECTION, SOLUTION INTRA-ARTICULAR; INTRALESIONAL; INTRAMUSCULAR; INTRAVENOUS; SOFT TISSUE DAILY
Status: DISCONTINUED | OUTPATIENT
Start: 2020-01-01 | End: 2020-01-01

## 2020-01-01 RX ORDER — ZIPRASIDONE MESYLATE 20 MG/ML
20 INJECTION, POWDER, LYOPHILIZED, FOR SOLUTION INTRAMUSCULAR EVERY 4 HOURS PRN
Status: DISCONTINUED | OUTPATIENT
Start: 2020-01-01 | End: 2020-01-01

## 2020-01-01 RX ORDER — ATROPINE SULFATE 10 MG/ML
2 SOLUTION/ DROPS OPHTHALMIC 2 TIMES DAILY PRN
Status: DISCONTINUED | OUTPATIENT
Start: 2020-01-01 | End: 2020-01-01 | Stop reason: HOSPADM

## 2020-01-01 RX ORDER — NICOTINE POLACRILEX 4 MG
15 LOZENGE BUCCAL
Status: DISCONTINUED | OUTPATIENT
Start: 2020-01-01 | End: 2020-01-01

## 2020-01-01 RX ORDER — DEXMEDETOMIDINE HYDROCHLORIDE 4 UG/ML
0.2 INJECTION, SOLUTION INTRAVENOUS
Status: DISCONTINUED | OUTPATIENT
Start: 2020-01-01 | End: 2020-01-01 | Stop reason: SDUPTHER

## 2020-01-01 RX ORDER — POLYETHYLENE GLYCOL 3350 17 G/17G
17 POWDER, FOR SOLUTION ORAL DAILY
Status: DISCONTINUED | OUTPATIENT
Start: 2020-01-01 | End: 2020-01-01

## 2020-01-01 RX ORDER — METOPROLOL TARTRATE 5 MG/5ML
2.5 INJECTION INTRAVENOUS EVERY 6 HOURS PRN
Status: DISCONTINUED | OUTPATIENT
Start: 2020-01-01 | End: 2020-01-01

## 2020-01-01 RX ORDER — GLYCOPYRROLATE 0.2 MG/ML
0.2 INJECTION INTRAMUSCULAR; INTRAVENOUS
Status: DISCONTINUED | OUTPATIENT
Start: 2020-01-01 | End: 2020-01-01 | Stop reason: HOSPADM

## 2020-01-01 RX ORDER — LORAZEPAM 2 MG/ML
2 INJECTION INTRAMUSCULAR ONCE
Status: COMPLETED | OUTPATIENT
Start: 2020-01-01 | End: 2020-01-01

## 2020-01-01 RX ORDER — DEXAMETHASONE SODIUM PHOSPHATE 4 MG/ML
INJECTION, SOLUTION INTRA-ARTICULAR; INTRALESIONAL; INTRAMUSCULAR; INTRAVENOUS; SOFT TISSUE
Status: DISPENSED
Start: 2020-01-01 | End: 2020-01-01

## 2020-01-01 RX ORDER — METOCLOPRAMIDE HYDROCHLORIDE 5 MG/ML
10 INJECTION INTRAMUSCULAR; INTRAVENOUS EVERY 6 HOURS SCHEDULED
Status: DISCONTINUED | OUTPATIENT
Start: 2020-01-01 | End: 2020-01-01

## 2020-01-01 RX ORDER — LANOLIN ALCOHOL/MO/W.PET/CERES
1000 CREAM (GRAM) TOPICAL DAILY
COMMUNITY

## 2020-01-01 RX ORDER — DILTIAZEM HYDROCHLORIDE 5 MG/ML
10 INJECTION INTRAVENOUS ONCE
Status: COMPLETED | OUTPATIENT
Start: 2020-01-01 | End: 2020-01-01

## 2020-01-01 RX ORDER — DEXAMETHASONE SODIUM PHOSPHATE 4 MG/ML
6 INJECTION, SOLUTION INTRA-ARTICULAR; INTRALESIONAL; INTRAMUSCULAR; INTRAVENOUS; SOFT TISSUE DAILY
Status: COMPLETED | OUTPATIENT
Start: 2020-01-01 | End: 2020-01-01

## 2020-01-01 RX ORDER — FAMOTIDINE 20 MG/1
20 TABLET, FILM COATED ORAL 2 TIMES DAILY
Status: DISCONTINUED | OUTPATIENT
Start: 2020-01-01 | End: 2020-01-01

## 2020-01-01 RX ORDER — SCOLOPAMINE TRANSDERMAL SYSTEM 1 MG/1
1 PATCH, EXTENDED RELEASE TRANSDERMAL
Status: DISCONTINUED | OUTPATIENT
Start: 2020-01-01 | End: 2020-01-01 | Stop reason: HOSPADM

## 2020-01-01 RX ORDER — HYDROCODONE BITARTRATE AND ACETAMINOPHEN 5; 325 MG/1; MG/1
1 TABLET ORAL EVERY 8 HOURS PRN
Status: DISCONTINUED | OUTPATIENT
Start: 2020-01-01 | End: 2020-01-01

## 2020-01-01 RX ORDER — HYDROCODONE BITARTRATE AND ACETAMINOPHEN 5; 325 MG/1; MG/1
1 TABLET ORAL ONCE
Status: COMPLETED | OUTPATIENT
Start: 2020-01-01 | End: 2020-01-01

## 2020-01-01 RX ADMIN — Medication 3 MG: at 20:28

## 2020-01-01 RX ADMIN — DILTIAZEM HYDROCHLORIDE 30 MG: 30 TABLET, FILM COATED ORAL at 00:46

## 2020-01-01 RX ADMIN — IPRATROPIUM BROMIDE 2 PUFF: 17 AEROSOL, METERED RESPIRATORY (INHALATION) at 10:51

## 2020-01-01 RX ADMIN — SODIUM CHLORIDE 2 G: 900 INJECTION INTRAVENOUS at 16:17

## 2020-01-01 RX ADMIN — LEVOTHYROXINE SODIUM 88 MCG: 88 TABLET ORAL at 05:02

## 2020-01-01 RX ADMIN — DILTIAZEM HYDROCHLORIDE 30 MG: 30 TABLET, FILM COATED ORAL at 14:00

## 2020-01-01 RX ADMIN — IPRATROPIUM BROMIDE 2 PUFF: 17 AEROSOL, METERED RESPIRATORY (INHALATION) at 07:15

## 2020-01-01 RX ADMIN — DEXMEDETOMIDINE HYDROCHLORIDE 0.25 MCG/KG/HR: 100 INJECTION, SOLUTION INTRAVENOUS at 15:58

## 2020-01-01 RX ADMIN — Medication 1000 MCG: at 08:46

## 2020-01-01 RX ADMIN — INSULIN LISPRO 7 UNITS: 100 INJECTION, SOLUTION INTRAVENOUS; SUBCUTANEOUS at 05:25

## 2020-01-01 RX ADMIN — PANTOPRAZOLE SODIUM 40 MG: 40 INJECTION, POWDER, FOR SOLUTION INTRAVENOUS at 20:53

## 2020-01-01 RX ADMIN — LINEZOLID 600 MG: 600 INJECTION, SOLUTION INTRAVENOUS at 06:18

## 2020-01-01 RX ADMIN — SODIUM CHLORIDE 1000 ML: 9 INJECTION, SOLUTION INTRAVENOUS at 10:15

## 2020-01-01 RX ADMIN — PANTOPRAZOLE SODIUM 40 MG: 40 INJECTION, POWDER, FOR SOLUTION INTRAVENOUS at 08:49

## 2020-01-01 RX ADMIN — OXYCODONE HYDROCHLORIDE AND ACETAMINOPHEN 500 MG: 500 TABLET ORAL at 08:10

## 2020-01-01 RX ADMIN — POTASSIUM CHLORIDE 20 MEQ: 29.8 INJECTION, SOLUTION INTRAVENOUS at 06:12

## 2020-01-01 RX ADMIN — Medication 3 MG: at 20:50

## 2020-01-01 RX ADMIN — REMDESIVIR 100 MG: 100 INJECTION, POWDER, LYOPHILIZED, FOR SOLUTION INTRAVENOUS at 12:51

## 2020-01-01 RX ADMIN — DEXAMETHASONE SODIUM PHOSPHATE 6 MG: 4 INJECTION, SOLUTION INTRAMUSCULAR; INTRAVENOUS at 08:05

## 2020-01-01 RX ADMIN — METOPROLOL TARTRATE 25 MG: 25 TABLET, FILM COATED ORAL at 08:29

## 2020-01-01 RX ADMIN — DILTIAZEM HYDROCHLORIDE 30 MG: 30 TABLET, FILM COATED ORAL at 00:16

## 2020-01-01 RX ADMIN — FAMOTIDINE 20 MG: 20 TABLET, FILM COATED ORAL at 08:29

## 2020-01-01 RX ADMIN — ZINC SULFATE 220 MG (50 MG) CAPSULE 220 MG: CAPSULE at 08:15

## 2020-01-01 RX ADMIN — Medication 1000 MCG: at 11:36

## 2020-01-01 RX ADMIN — HYDROMORPHONE HYDROCHLORIDE 0.5 MG: 1 INJECTION, SOLUTION INTRAMUSCULAR; INTRAVENOUS; SUBCUTANEOUS at 00:07

## 2020-01-01 RX ADMIN — BISACODYL 10 MG: 10 SUPPOSITORY RECTAL at 12:23

## 2020-01-01 RX ADMIN — ATORVASTATIN CALCIUM 10 MG: 10 TABLET, FILM COATED ORAL at 20:00

## 2020-01-01 RX ADMIN — IPRATROPIUM BROMIDE 2 PUFF: 17 AEROSOL, METERED RESPIRATORY (INHALATION) at 06:55

## 2020-01-01 RX ADMIN — INSULIN LISPRO 6 UNITS: 100 INJECTION, SOLUTION INTRAVENOUS; SUBCUTANEOUS at 12:33

## 2020-01-01 RX ADMIN — DEXAMETHASONE SODIUM PHOSPHATE 6 MG: 4 INJECTION, SOLUTION INTRAMUSCULAR; INTRAVENOUS at 09:26

## 2020-01-01 RX ADMIN — INSULIN DETEMIR 10 UNITS: 100 INJECTION, SOLUTION SUBCUTANEOUS at 20:22

## 2020-01-01 RX ADMIN — ZINC SULFATE 220 MG (50 MG) CAPSULE 220 MG: CAPSULE at 08:06

## 2020-01-01 RX ADMIN — FAMOTIDINE 20 MG: 10 INJECTION INTRAVENOUS at 10:03

## 2020-01-01 RX ADMIN — FENTANYL CITRATE 300 MCG/HR: 0.05 INJECTION, SOLUTION INTRAMUSCULAR; INTRAVENOUS at 19:41

## 2020-01-01 RX ADMIN — GLIPIZIDE 2.5 MG: 5 TABLET ORAL at 08:04

## 2020-01-01 RX ADMIN — DILTIAZEM HYDROCHLORIDE 30 MG: 30 TABLET, FILM COATED ORAL at 17:35

## 2020-01-01 RX ADMIN — OXYCODONE HYDROCHLORIDE AND ACETAMINOPHEN 500 MG: 500 TABLET ORAL at 20:06

## 2020-01-01 RX ADMIN — METOPROLOL TARTRATE 2.5 MG: 5 INJECTION INTRAVENOUS at 20:54

## 2020-01-01 RX ADMIN — Medication 1000 MCG: at 08:28

## 2020-01-01 RX ADMIN — DILTIAZEM HYDROCHLORIDE 5 MG/HR: 5 INJECTION INTRAVENOUS at 11:42

## 2020-01-01 RX ADMIN — DEXAMETHASONE SODIUM PHOSPHATE 4 MG: 4 INJECTION, SOLUTION INTRAMUSCULAR; INTRAVENOUS at 08:05

## 2020-01-01 RX ADMIN — ZINC SULFATE 220 MG (50 MG) CAPSULE 220 MG: CAPSULE at 09:40

## 2020-01-01 RX ADMIN — SODIUM CHLORIDE 2 G: 900 INJECTION INTRAVENOUS at 06:45

## 2020-01-01 RX ADMIN — IPRATROPIUM BROMIDE 2 PUFF: 17 AEROSOL, METERED RESPIRATORY (INHALATION) at 10:31

## 2020-01-01 RX ADMIN — GLIPIZIDE 2.5 MG: 5 TABLET ORAL at 09:11

## 2020-01-01 RX ADMIN — NOREPINEPHRINE BITARTRATE 0.1 MCG/KG/MIN: 1 INJECTION INTRAVENOUS at 10:02

## 2020-01-01 RX ADMIN — FUROSEMIDE 20 MG: 10 INJECTION, SOLUTION INTRAMUSCULAR; INTRAVENOUS at 22:55

## 2020-01-01 RX ADMIN — DILTIAZEM HYDROCHLORIDE 30 MG: 30 TABLET, FILM COATED ORAL at 05:14

## 2020-01-01 RX ADMIN — DILTIAZEM HYDROCHLORIDE 30 MG: 30 TABLET, FILM COATED ORAL at 11:18

## 2020-01-01 RX ADMIN — BISACODYL 10 MG: 10 SUPPOSITORY RECTAL at 19:58

## 2020-01-01 RX ADMIN — REMDESIVIR 100 MG: 100 INJECTION, POWDER, LYOPHILIZED, FOR SOLUTION INTRAVENOUS at 12:55

## 2020-01-01 RX ADMIN — DILTIAZEM HYDROCHLORIDE 5 MG/HR: 5 INJECTION INTRAVENOUS at 20:08

## 2020-01-01 RX ADMIN — DEXMEDETOMIDINE HYDROCHLORIDE 1.3 MCG/KG/HR: 100 INJECTION, SOLUTION INTRAVENOUS at 17:34

## 2020-01-01 RX ADMIN — IPRATROPIUM BROMIDE 2 PUFF: 17 AEROSOL, METERED RESPIRATORY (INHALATION) at 14:00

## 2020-01-01 RX ADMIN — SODIUM CHLORIDE 2 G: 900 INJECTION INTRAVENOUS at 15:41

## 2020-01-01 RX ADMIN — DEXAMETHASONE SODIUM PHOSPHATE 6 MG: 4 INJECTION, SOLUTION INTRAMUSCULAR; INTRAVENOUS at 07:55

## 2020-01-01 RX ADMIN — MEROPENEM 2 G: 1 INJECTION, POWDER, FOR SOLUTION INTRAVENOUS at 11:14

## 2020-01-01 RX ADMIN — GLIPIZIDE 2.5 MG: 5 TABLET ORAL at 08:28

## 2020-01-01 RX ADMIN — DILTIAZEM HYDROCHLORIDE 60 MG: 60 TABLET, FILM COATED ORAL at 12:39

## 2020-01-01 RX ADMIN — FAMOTIDINE 20 MG: 20 TABLET, FILM COATED ORAL at 09:11

## 2020-01-01 RX ADMIN — DILTIAZEM HYDROCHLORIDE 10 MG: 5 INJECTION INTRAVENOUS at 13:29

## 2020-01-01 RX ADMIN — DEXMEDETOMIDINE HYDROCHLORIDE 0.75 MCG/KG/HR: 4 INJECTION, SOLUTION INTRAVENOUS at 18:39

## 2020-01-01 RX ADMIN — DILTIAZEM HYDROCHLORIDE 30 MG: 30 TABLET, FILM COATED ORAL at 23:03

## 2020-01-01 RX ADMIN — DILTIAZEM HYDROCHLORIDE 30 MG: 30 TABLET, FILM COATED ORAL at 05:58

## 2020-01-01 RX ADMIN — FAMOTIDINE 20 MG: 10 INJECTION INTRAVENOUS at 21:05

## 2020-01-01 RX ADMIN — DILTIAZEM HYDROCHLORIDE 30 MG: 30 TABLET, FILM COATED ORAL at 06:35

## 2020-01-01 RX ADMIN — OXYCODONE HYDROCHLORIDE AND ACETAMINOPHEN 500 MG: 500 TABLET ORAL at 11:37

## 2020-01-01 RX ADMIN — DILTIAZEM HYDROCHLORIDE 60 MG: 60 TABLET, FILM COATED ORAL at 05:23

## 2020-01-01 RX ADMIN — Medication 1000 MCG: at 08:29

## 2020-01-01 RX ADMIN — OXYCODONE HYDROCHLORIDE AND ACETAMINOPHEN 500 MG: 500 TABLET ORAL at 20:17

## 2020-01-01 RX ADMIN — DEXAMETHASONE SODIUM PHOSPHATE 6 MG: 4 INJECTION, SOLUTION INTRAMUSCULAR; INTRAVENOUS at 08:26

## 2020-01-01 RX ADMIN — Medication 1000 MCG: at 08:00

## 2020-01-01 RX ADMIN — INSULIN LISPRO 4 UNITS: 100 INJECTION, SOLUTION INTRAVENOUS; SUBCUTANEOUS at 18:19

## 2020-01-01 RX ADMIN — DILTIAZEM HYDROCHLORIDE 30 MG: 30 TABLET, FILM COATED ORAL at 17:19

## 2020-01-01 RX ADMIN — PANTOPRAZOLE SODIUM 40 MG: 40 INJECTION, POWDER, FOR SOLUTION INTRAVENOUS at 08:30

## 2020-01-01 RX ADMIN — FUROSEMIDE 20 MG: 10 INJECTION, SOLUTION INTRAMUSCULAR; INTRAVENOUS at 11:01

## 2020-01-01 RX ADMIN — CHOLECALCIFEROL (VITAMIN D3) 25 MCG (1,000 UNIT) TABLET 2000 UNITS: TABLET at 08:29

## 2020-01-01 RX ADMIN — BUDESONIDE AND FORMOTEROL FUMARATE DIHYDRATE 2 PUFF: 160; 4.5 AEROSOL RESPIRATORY (INHALATION) at 21:01

## 2020-01-01 RX ADMIN — Medication 3 MG: at 22:45

## 2020-01-01 RX ADMIN — DEXMEDETOMIDINE HYDROCHLORIDE 0.35 MCG/KG/HR: 100 INJECTION, SOLUTION INTRAVENOUS at 22:07

## 2020-01-01 RX ADMIN — BUDESONIDE AND FORMOTEROL FUMARATE DIHYDRATE 2 PUFF: 160; 4.5 AEROSOL RESPIRATORY (INHALATION) at 06:58

## 2020-01-01 RX ADMIN — DILTIAZEM HYDROCHLORIDE 60 MG: 60 TABLET, FILM COATED ORAL at 00:09

## 2020-01-01 RX ADMIN — LEVOTHYROXINE SODIUM 88 MCG: 88 TABLET ORAL at 05:11

## 2020-01-01 RX ADMIN — DEXMEDETOMIDINE HYDROCHLORIDE 1.5 MCG/KG/HR: 100 INJECTION, SOLUTION INTRAVENOUS at 18:04

## 2020-01-01 RX ADMIN — DILTIAZEM HYDROCHLORIDE 60 MG: 60 TABLET, FILM COATED ORAL at 17:38

## 2020-01-01 RX ADMIN — IPRATROPIUM BROMIDE 2 PUFF: 17 AEROSOL, METERED RESPIRATORY (INHALATION) at 21:13

## 2020-01-01 RX ADMIN — ENOXAPARIN SODIUM 60 MG: 60 INJECTION SUBCUTANEOUS at 08:48

## 2020-01-01 RX ADMIN — OXYCODONE HYDROCHLORIDE AND ACETAMINOPHEN 500 MG: 500 TABLET ORAL at 09:10

## 2020-01-01 RX ADMIN — INSULIN DETEMIR 10 UNITS: 100 INJECTION, SOLUTION SUBCUTANEOUS at 20:00

## 2020-01-01 RX ADMIN — PANTOPRAZOLE SODIUM 40 MG: 40 INJECTION, POWDER, FOR SOLUTION INTRAVENOUS at 08:06

## 2020-01-01 RX ADMIN — METOCLOPRAMIDE 10 MG: 5 INJECTION, SOLUTION INTRAMUSCULAR; INTRAVENOUS at 11:51

## 2020-01-01 RX ADMIN — BUDESONIDE AND FORMOTEROL FUMARATE DIHYDRATE 2 PUFF: 160; 4.5 AEROSOL RESPIRATORY (INHALATION) at 07:15

## 2020-01-01 RX ADMIN — BUDESONIDE AND FORMOTEROL FUMARATE DIHYDRATE 2 PUFF: 160; 4.5 AEROSOL RESPIRATORY (INHALATION) at 19:08

## 2020-01-01 RX ADMIN — IPRATROPIUM BROMIDE 2 PUFF: 17 AEROSOL, METERED RESPIRATORY (INHALATION) at 19:20

## 2020-01-01 RX ADMIN — INSULIN LISPRO 2 UNITS: 100 INJECTION, SOLUTION INTRAVENOUS; SUBCUTANEOUS at 23:15

## 2020-01-01 RX ADMIN — ENOXAPARIN SODIUM 60 MG: 60 INJECTION SUBCUTANEOUS at 20:01

## 2020-01-01 RX ADMIN — FAMOTIDINE 20 MG: 20 TABLET, FILM COATED ORAL at 19:11

## 2020-01-01 RX ADMIN — FAMOTIDINE 20 MG: 10 INJECTION INTRAVENOUS at 10:04

## 2020-01-01 RX ADMIN — INSULIN LISPRO 3 UNITS: 100 INJECTION, SOLUTION INTRAVENOUS; SUBCUTANEOUS at 17:23

## 2020-01-01 RX ADMIN — METOPROLOL TARTRATE 25 MG: 25 TABLET, FILM COATED ORAL at 03:24

## 2020-01-01 RX ADMIN — CHOLECALCIFEROL (VITAMIN D3) 25 MCG (1,000 UNIT) TABLET 2000 UNITS: TABLET at 09:11

## 2020-01-01 RX ADMIN — SODIUM CHLORIDE 2 G: 900 INJECTION INTRAVENOUS at 13:31

## 2020-01-01 RX ADMIN — DEXMEDETOMIDINE HYDROCHLORIDE 1.2 MCG/KG/HR: 100 INJECTION, SOLUTION INTRAVENOUS at 17:50

## 2020-01-01 RX ADMIN — BUDESONIDE AND FORMOTEROL FUMARATE DIHYDRATE 2 PUFF: 160; 4.5 AEROSOL RESPIRATORY (INHALATION) at 06:44

## 2020-01-01 RX ADMIN — OXYCODONE HYDROCHLORIDE AND ACETAMINOPHEN 500 MG: 500 TABLET ORAL at 20:00

## 2020-01-01 RX ADMIN — ZINC SULFATE 220 MG (50 MG) CAPSULE 220 MG: CAPSULE at 11:36

## 2020-01-01 RX ADMIN — ATORVASTATIN CALCIUM 10 MG: 10 TABLET, FILM COATED ORAL at 22:44

## 2020-01-01 RX ADMIN — LEVOTHYROXINE SODIUM 100 MCG: 100 TABLET ORAL at 08:29

## 2020-01-01 RX ADMIN — PANTOPRAZOLE SODIUM 40 MG: 40 INJECTION, POWDER, FOR SOLUTION INTRAVENOUS at 10:00

## 2020-01-01 RX ADMIN — PANTOPRAZOLE SODIUM 40 MG: 40 INJECTION, POWDER, FOR SOLUTION INTRAVENOUS at 08:34

## 2020-01-01 RX ADMIN — METOPROLOL TARTRATE 2.5 MG: 5 INJECTION INTRAVENOUS at 09:06

## 2020-01-01 RX ADMIN — BUDESONIDE AND FORMOTEROL FUMARATE DIHYDRATE 2 PUFF: 160; 4.5 AEROSOL RESPIRATORY (INHALATION) at 19:03

## 2020-01-01 RX ADMIN — OXYCODONE HYDROCHLORIDE AND ACETAMINOPHEN 500 MG: 500 TABLET ORAL at 08:29

## 2020-01-01 RX ADMIN — SODIUM CHLORIDE 2 G: 900 INJECTION INTRAVENOUS at 20:10

## 2020-01-01 RX ADMIN — DILTIAZEM HYDROCHLORIDE 5 MG/HR: 5 INJECTION INTRAVENOUS at 22:52

## 2020-01-01 RX ADMIN — OXYCODONE HYDROCHLORIDE AND ACETAMINOPHEN 500 MG: 500 TABLET ORAL at 20:03

## 2020-01-01 RX ADMIN — BUDESONIDE 2 PUFF: 180 AEROSOL, POWDER RESPIRATORY (INHALATION) at 20:49

## 2020-01-01 RX ADMIN — Medication 1000 MCG: at 08:34

## 2020-01-01 RX ADMIN — IPRATROPIUM BROMIDE 2 PUFF: 17 AEROSOL, METERED RESPIRATORY (INHALATION) at 14:44

## 2020-01-01 RX ADMIN — LORAZEPAM 1 MG: 2 INJECTION INTRAMUSCULAR; INTRAVENOUS at 06:29

## 2020-01-01 RX ADMIN — METOPROLOL TARTRATE 25 MG: 25 TABLET, FILM COATED ORAL at 09:10

## 2020-01-01 RX ADMIN — CHOLECALCIFEROL (VITAMIN D3) 25 MCG (1,000 UNIT) TABLET 2000 UNITS: TABLET at 08:15

## 2020-01-01 RX ADMIN — BUDESONIDE AND FORMOTEROL FUMARATE DIHYDRATE 2 PUFF: 160; 4.5 AEROSOL RESPIRATORY (INHALATION) at 21:26

## 2020-01-01 RX ADMIN — DEXAMETHASONE SODIUM PHOSPHATE 4 MG: 4 INJECTION, SOLUTION INTRAMUSCULAR; INTRAVENOUS at 08:34

## 2020-01-01 RX ADMIN — Medication 3 MG: at 20:03

## 2020-01-01 RX ADMIN — INSULIN LISPRO 4 UNITS: 100 INJECTION, SOLUTION INTRAVENOUS; SUBCUTANEOUS at 17:01

## 2020-01-01 RX ADMIN — Medication 3 MG: at 20:00

## 2020-01-01 RX ADMIN — METOPROLOL TARTRATE 2.5 MG: 5 INJECTION INTRAVENOUS at 10:03

## 2020-01-01 RX ADMIN — BUDESONIDE 2 PUFF: 180 AEROSOL, POWDER RESPIRATORY (INHALATION) at 07:09

## 2020-01-01 RX ADMIN — ENOXAPARIN SODIUM 60 MG: 60 INJECTION SUBCUTANEOUS at 10:03

## 2020-01-01 RX ADMIN — ENOXAPARIN SODIUM 60 MG: 60 INJECTION SUBCUTANEOUS at 20:00

## 2020-01-01 RX ADMIN — DILTIAZEM HYDROCHLORIDE 60 MG: 60 TABLET, FILM COATED ORAL at 05:11

## 2020-01-01 RX ADMIN — INSULIN DETEMIR 10 UNITS: 100 INJECTION, SOLUTION SUBCUTANEOUS at 10:24

## 2020-01-01 RX ADMIN — DEXMEDETOMIDINE HYDROCHLORIDE 1.2 MCG/KG/HR: 100 INJECTION, SOLUTION INTRAVENOUS at 10:42

## 2020-01-01 RX ADMIN — METOCLOPRAMIDE 10 MG: 5 INJECTION, SOLUTION INTRAMUSCULAR; INTRAVENOUS at 23:21

## 2020-01-01 RX ADMIN — SODIUM CHLORIDE 2 G: 900 INJECTION INTRAVENOUS at 16:36

## 2020-01-01 RX ADMIN — DEXAMETHASONE SODIUM PHOSPHATE 4 MG: 4 INJECTION, SOLUTION INTRAMUSCULAR; INTRAVENOUS at 09:42

## 2020-01-01 RX ADMIN — DEXAMETHASONE SODIUM PHOSPHATE 2 MG: 4 INJECTION, SOLUTION INTRAMUSCULAR; INTRAVENOUS at 08:29

## 2020-01-01 RX ADMIN — DEXMEDETOMIDINE HYDROCHLORIDE 1.5 MCG/KG/HR: 100 INJECTION, SOLUTION INTRAVENOUS at 22:13

## 2020-01-01 RX ADMIN — HYDROCODONE BITARTRATE AND ACETAMINOPHEN 1 TABLET: 5; 325 TABLET ORAL at 09:56

## 2020-01-01 RX ADMIN — DILTIAZEM HYDROCHLORIDE 30 MG: 30 TABLET, FILM COATED ORAL at 23:21

## 2020-01-01 RX ADMIN — INSULIN LISPRO 2 UNITS: 100 INJECTION, SOLUTION INTRAVENOUS; SUBCUTANEOUS at 06:46

## 2020-01-01 RX ADMIN — POTASSIUM CHLORIDE 40 MEQ: 20 SOLUTION ORAL at 10:01

## 2020-01-01 RX ADMIN — LEVOTHYROXINE SODIUM 88 MCG: 88 TABLET ORAL at 06:17

## 2020-01-01 RX ADMIN — DEXMEDETOMIDINE HYDROCHLORIDE 0.75 MCG/KG/HR: 4 INJECTION, SOLUTION INTRAVENOUS at 03:02

## 2020-01-01 RX ADMIN — CHOLECALCIFEROL (VITAMIN D3) 25 MCG (1,000 UNIT) TABLET 2000 UNITS: TABLET at 10:26

## 2020-01-01 RX ADMIN — PANTOPRAZOLE SODIUM 40 MG: 40 INJECTION, POWDER, FOR SOLUTION INTRAVENOUS at 08:01

## 2020-01-01 RX ADMIN — DILTIAZEM HYDROCHLORIDE 30 MG: 30 TABLET, FILM COATED ORAL at 12:01

## 2020-01-01 RX ADMIN — FENTANYL CITRATE 300 MCG/HR: 0.05 INJECTION, SOLUTION INTRAMUSCULAR; INTRAVENOUS at 10:03

## 2020-01-01 RX ADMIN — BUDESONIDE AND FORMOTEROL FUMARATE DIHYDRATE 2 PUFF: 160; 4.5 AEROSOL RESPIRATORY (INHALATION) at 08:22

## 2020-01-01 RX ADMIN — LORAZEPAM 1 MG: 2 INJECTION INTRAMUSCULAR; INTRAVENOUS at 20:38

## 2020-01-01 RX ADMIN — DEXMEDETOMIDINE HYDROCHLORIDE 1.2 MCG/KG/HR: 100 INJECTION, SOLUTION INTRAVENOUS at 06:11

## 2020-01-01 RX ADMIN — DILTIAZEM HYDROCHLORIDE 30 MG: 30 TABLET, FILM COATED ORAL at 05:21

## 2020-01-01 RX ADMIN — OXYCODONE HYDROCHLORIDE AND ACETAMINOPHEN 500 MG: 500 TABLET ORAL at 20:25

## 2020-01-01 RX ADMIN — POTASSIUM CHLORIDE 20 MEQ: 29.8 INJECTION, SOLUTION INTRAVENOUS at 04:52

## 2020-01-01 RX ADMIN — DILTIAZEM HYDROCHLORIDE 30 MG: 30 TABLET, FILM COATED ORAL at 00:18

## 2020-01-01 RX ADMIN — GLIPIZIDE 2.5 MG: 5 TABLET ORAL at 10:02

## 2020-01-01 RX ADMIN — METOPROLOL TARTRATE 2.5 MG: 5 INJECTION INTRAVENOUS at 12:45

## 2020-01-01 RX ADMIN — INSULIN LISPRO 6 UNITS: 100 INJECTION, SOLUTION INTRAVENOUS; SUBCUTANEOUS at 09:06

## 2020-01-01 RX ADMIN — METOPROLOL TARTRATE 2.5 MG: 5 INJECTION INTRAVENOUS at 17:07

## 2020-01-01 RX ADMIN — BUDESONIDE AND FORMOTEROL FUMARATE DIHYDRATE 2 PUFF: 160; 4.5 AEROSOL RESPIRATORY (INHALATION) at 06:20

## 2020-01-01 RX ADMIN — BUDESONIDE AND FORMOTEROL FUMARATE DIHYDRATE 2 PUFF: 160; 4.5 AEROSOL RESPIRATORY (INHALATION) at 19:32

## 2020-01-01 RX ADMIN — DEXAMETHASONE SODIUM PHOSPHATE 2 MG: 4 INJECTION, SOLUTION INTRAMUSCULAR; INTRAVENOUS at 08:01

## 2020-01-01 RX ADMIN — DEXMEDETOMIDINE HYDROCHLORIDE 1.2 MCG/KG/HR: 100 INJECTION, SOLUTION INTRAVENOUS at 05:22

## 2020-01-01 RX ADMIN — INSULIN LISPRO 4 UNITS: 100 INJECTION, SOLUTION INTRAVENOUS; SUBCUTANEOUS at 00:52

## 2020-01-01 RX ADMIN — DILTIAZEM HYDROCHLORIDE 30 MG: 30 TABLET, FILM COATED ORAL at 06:47

## 2020-01-01 RX ADMIN — ZINC SULFATE 220 MG (50 MG) CAPSULE 220 MG: CAPSULE at 08:29

## 2020-01-01 RX ADMIN — OXYCODONE HYDROCHLORIDE AND ACETAMINOPHEN 500 MG: 500 TABLET ORAL at 08:01

## 2020-01-01 RX ADMIN — DILTIAZEM HYDROCHLORIDE 60 MG: 60 TABLET, FILM COATED ORAL at 11:18

## 2020-01-01 RX ADMIN — CHOLECALCIFEROL (VITAMIN D3) 25 MCG (1,000 UNIT) TABLET 2000 UNITS: TABLET at 08:34

## 2020-01-01 RX ADMIN — LORAZEPAM 1 MG: 2 INJECTION INTRAMUSCULAR; INTRAVENOUS at 01:30

## 2020-01-01 RX ADMIN — ACETAMINOPHEN 650 MG: 325 TABLET, FILM COATED ORAL at 16:39

## 2020-01-01 RX ADMIN — DEXMEDETOMIDINE HYDROCHLORIDE 1.3 MCG/KG/HR: 100 INJECTION, SOLUTION INTRAVENOUS at 03:50

## 2020-01-01 RX ADMIN — ENOXAPARIN SODIUM 70 MG: 80 INJECTION SUBCUTANEOUS at 09:12

## 2020-01-01 RX ADMIN — METOCLOPRAMIDE 10 MG: 5 INJECTION, SOLUTION INTRAMUSCULAR; INTRAVENOUS at 12:00

## 2020-01-01 RX ADMIN — HYDROCODONE BITARTRATE AND ACETAMINOPHEN 1 TABLET: 5; 325 TABLET ORAL at 16:47

## 2020-01-01 RX ADMIN — Medication 3 MG: at 20:36

## 2020-01-01 RX ADMIN — Medication 1000 MCG: at 09:10

## 2020-01-01 RX ADMIN — IPRATROPIUM BROMIDE 2 PUFF: 17 AEROSOL, METERED RESPIRATORY (INHALATION) at 13:54

## 2020-01-01 RX ADMIN — IPRATROPIUM BROMIDE 2 PUFF: 17 AEROSOL, METERED RESPIRATORY (INHALATION) at 15:12

## 2020-01-01 RX ADMIN — ENOXAPARIN SODIUM 60 MG: 60 INJECTION SUBCUTANEOUS at 20:35

## 2020-01-01 RX ADMIN — OXYCODONE HYDROCHLORIDE AND ACETAMINOPHEN 500 MG: 500 TABLET ORAL at 08:30

## 2020-01-01 RX ADMIN — INSULIN LISPRO 2 UNITS: 100 INJECTION, SOLUTION INTRAVENOUS; SUBCUTANEOUS at 16:45

## 2020-01-01 RX ADMIN — Medication 1000 MCG: at 19:11

## 2020-01-01 RX ADMIN — SODIUM CHLORIDE 2 G: 900 INJECTION INTRAVENOUS at 15:53

## 2020-01-01 RX ADMIN — METOPROLOL TARTRATE 25 MG: 25 TABLET, FILM COATED ORAL at 08:00

## 2020-01-01 RX ADMIN — METOPROLOL TARTRATE 2.5 MG: 5 INJECTION INTRAVENOUS at 17:17

## 2020-01-01 RX ADMIN — DEXAMETHASONE SODIUM PHOSPHATE 4 MG: 4 INJECTION, SOLUTION INTRAMUSCULAR; INTRAVENOUS at 08:55

## 2020-01-01 RX ADMIN — ATORVASTATIN CALCIUM 10 MG: 10 TABLET, FILM COATED ORAL at 20:06

## 2020-01-01 RX ADMIN — FENTANYL CITRATE 300 MCG/HR: 0.05 INJECTION, SOLUTION INTRAMUSCULAR; INTRAVENOUS at 05:25

## 2020-01-01 RX ADMIN — FAMOTIDINE 20 MG: 20 TABLET, FILM COATED ORAL at 22:52

## 2020-01-01 RX ADMIN — INSULIN LISPRO 4 UNITS: 100 INJECTION, SOLUTION INTRAVENOUS; SUBCUTANEOUS at 11:34

## 2020-01-01 RX ADMIN — INSULIN LISPRO 6 UNITS: 100 INJECTION, SOLUTION INTRAVENOUS; SUBCUTANEOUS at 11:41

## 2020-01-01 RX ADMIN — ENOXAPARIN SODIUM 60 MG: 60 INJECTION SUBCUTANEOUS at 20:16

## 2020-01-01 RX ADMIN — HYDROCODONE BITARTRATE AND ACETAMINOPHEN 1 TABLET: 5; 325 TABLET ORAL at 18:41

## 2020-01-01 RX ADMIN — POTASSIUM CHLORIDE 20 MEQ: 29.8 INJECTION, SOLUTION INTRAVENOUS at 05:53

## 2020-01-01 RX ADMIN — OXYCODONE HYDROCHLORIDE AND ACETAMINOPHEN 500 MG: 500 TABLET ORAL at 23:18

## 2020-01-01 RX ADMIN — INSULIN LISPRO 5 UNITS: 100 INJECTION, SOLUTION INTRAVENOUS; SUBCUTANEOUS at 23:12

## 2020-01-01 RX ADMIN — BUDESONIDE AND FORMOTEROL FUMARATE DIHYDRATE 2 PUFF: 160; 4.5 AEROSOL RESPIRATORY (INHALATION) at 06:00

## 2020-01-01 RX ADMIN — POTASSIUM CHLORIDE 40 MEQ: 20 SOLUTION ORAL at 15:52

## 2020-01-01 RX ADMIN — OXYCODONE HYDROCHLORIDE AND ACETAMINOPHEN 500 MG: 500 TABLET ORAL at 09:40

## 2020-01-01 RX ADMIN — FAMOTIDINE 20 MG: 20 TABLET, FILM COATED ORAL at 20:06

## 2020-01-01 RX ADMIN — BISACODYL 10 MG: 10 SUPPOSITORY RECTAL at 10:03

## 2020-01-01 RX ADMIN — INSULIN LISPRO 3 UNITS: 100 INJECTION, SOLUTION INTRAVENOUS; SUBCUTANEOUS at 13:06

## 2020-01-01 RX ADMIN — PANTOPRAZOLE SODIUM 40 MG: 40 INJECTION, POWDER, FOR SOLUTION INTRAVENOUS at 21:01

## 2020-01-01 RX ADMIN — HALOPERIDOL LACTATE 1 MG: 5 INJECTION, SOLUTION INTRAMUSCULAR at 15:41

## 2020-01-01 RX ADMIN — BUDESONIDE 2 PUFF: 180 AEROSOL, POWDER RESPIRATORY (INHALATION) at 06:41

## 2020-01-01 RX ADMIN — SODIUM CHLORIDE 2 G: 900 INJECTION INTRAVENOUS at 18:28

## 2020-01-01 RX ADMIN — Medication 3 MG: at 20:33

## 2020-01-01 RX ADMIN — FENTANYL CITRATE 200 MCG/HR: 0.05 INJECTION, SOLUTION INTRAMUSCULAR; INTRAVENOUS at 18:41

## 2020-01-01 RX ADMIN — BUDESONIDE AND FORMOTEROL FUMARATE DIHYDRATE 2 PUFF: 160; 4.5 AEROSOL RESPIRATORY (INHALATION) at 19:35

## 2020-01-01 RX ADMIN — PANTOPRAZOLE SODIUM 40 MG: 40 INJECTION, POWDER, FOR SOLUTION INTRAVENOUS at 20:12

## 2020-01-01 RX ADMIN — FUROSEMIDE 20 MG: 10 INJECTION, SOLUTION INTRAMUSCULAR; INTRAVENOUS at 23:12

## 2020-01-01 RX ADMIN — HYDROCODONE BITARTRATE AND ACETAMINOPHEN 1 TABLET: 5; 325 TABLET ORAL at 02:58

## 2020-01-01 RX ADMIN — INSULIN LISPRO 4 UNITS: 100 INJECTION, SOLUTION INTRAVENOUS; SUBCUTANEOUS at 11:02

## 2020-01-01 RX ADMIN — IPRATROPIUM BROMIDE 2 PUFF: 17 AEROSOL, METERED RESPIRATORY (INHALATION) at 15:33

## 2020-01-01 RX ADMIN — INSULIN DETEMIR 10 UNITS: 100 INJECTION, SOLUTION SUBCUTANEOUS at 20:29

## 2020-01-01 RX ADMIN — INSULIN LISPRO 4 UNITS: 100 INJECTION, SOLUTION INTRAVENOUS; SUBCUTANEOUS at 23:31

## 2020-01-01 RX ADMIN — CLINDAMYCIN IN 5 PERCENT DEXTROSE 900 MG: 18 INJECTION, SOLUTION INTRAVENOUS at 13:31

## 2020-01-01 RX ADMIN — SODIUM CHLORIDE 2 G: 900 INJECTION INTRAVENOUS at 00:50

## 2020-01-01 RX ADMIN — IPRATROPIUM BROMIDE 2 PUFF: 17 AEROSOL, METERED RESPIRATORY (INHALATION) at 10:45

## 2020-01-01 RX ADMIN — DEXAMETHASONE SODIUM PHOSPHATE 6 MG: 4 INJECTION, SOLUTION INTRAMUSCULAR; INTRAVENOUS at 08:29

## 2020-01-01 RX ADMIN — METOPROLOL TARTRATE 2.5 MG: 5 INJECTION INTRAVENOUS at 06:22

## 2020-01-01 RX ADMIN — ENOXAPARIN SODIUM 60 MG: 60 INJECTION SUBCUTANEOUS at 21:05

## 2020-01-01 RX ADMIN — REMDESIVIR 200 MG: 100 INJECTION, POWDER, LYOPHILIZED, FOR SOLUTION INTRAVENOUS at 13:30

## 2020-01-01 RX ADMIN — PANTOPRAZOLE SODIUM 40 MG: 40 INJECTION, POWDER, FOR SOLUTION INTRAVENOUS at 09:10

## 2020-01-01 RX ADMIN — Medication 1000 MCG: at 10:48

## 2020-01-01 RX ADMIN — DEXMEDETOMIDINE HYDROCHLORIDE 0.2 MCG/KG/HR: 100 INJECTION, SOLUTION INTRAVENOUS at 11:33

## 2020-01-01 RX ADMIN — DEXMEDETOMIDINE HYDROCHLORIDE 1.2 MCG/KG/HR: 100 INJECTION, SOLUTION INTRAVENOUS at 11:43

## 2020-01-01 RX ADMIN — DEXMEDETOMIDINE HYDROCHLORIDE 1.3 MCG/KG/HR: 100 INJECTION, SOLUTION INTRAVENOUS at 22:16

## 2020-01-01 RX ADMIN — METOPROLOL TARTRATE 2.5 MG: 5 INJECTION INTRAVENOUS at 17:45

## 2020-01-01 RX ADMIN — SODIUM CHLORIDE 2 G: 900 INJECTION INTRAVENOUS at 06:34

## 2020-01-01 RX ADMIN — LEVOTHYROXINE SODIUM 88 MCG: 88 TABLET ORAL at 05:52

## 2020-01-01 RX ADMIN — OXYCODONE HYDROCHLORIDE AND ACETAMINOPHEN 500 MG: 500 TABLET ORAL at 08:04

## 2020-01-01 RX ADMIN — IPRATROPIUM BROMIDE 2 PUFF: 17 AEROSOL, METERED RESPIRATORY (INHALATION) at 07:03

## 2020-01-01 RX ADMIN — LORAZEPAM 1 MG: 2 INJECTION INTRAMUSCULAR; INTRAVENOUS at 15:49

## 2020-01-01 RX ADMIN — INSULIN LISPRO 7 UNITS: 100 INJECTION, SOLUTION INTRAVENOUS; SUBCUTANEOUS at 00:27

## 2020-01-01 RX ADMIN — DILTIAZEM HYDROCHLORIDE 60 MG: 60 TABLET, FILM COATED ORAL at 11:33

## 2020-01-01 RX ADMIN — NOREPINEPHRINE BITARTRATE 0.02 MCG/KG/MIN: 1 INJECTION INTRAVENOUS at 00:58

## 2020-01-01 RX ADMIN — DILTIAZEM HYDROCHLORIDE 30 MG: 30 TABLET, FILM COATED ORAL at 05:23

## 2020-01-01 RX ADMIN — ZINC SULFATE 220 MG (50 MG) CAPSULE 220 MG: CAPSULE at 10:49

## 2020-01-01 RX ADMIN — DILTIAZEM HYDROCHLORIDE 30 MG: 30 TABLET, FILM COATED ORAL at 18:28

## 2020-01-01 RX ADMIN — IPRATROPIUM BROMIDE 2 PUFF: 17 AEROSOL, METERED RESPIRATORY (INHALATION) at 08:18

## 2020-01-01 RX ADMIN — METOCLOPRAMIDE HYDROCHLORIDE 5 MG: 5 INJECTION INTRAMUSCULAR; INTRAVENOUS at 11:03

## 2020-01-01 RX ADMIN — NOREPINEPHRINE BITARTRATE 0.06 MCG/KG/MIN: 1 INJECTION, SOLUTION, CONCENTRATE INTRAVENOUS at 03:03

## 2020-01-01 RX ADMIN — GLIPIZIDE 2.5 MG: 5 TABLET ORAL at 11:36

## 2020-01-01 RX ADMIN — REMDESIVIR 100 MG: 100 INJECTION, POWDER, LYOPHILIZED, FOR SOLUTION INTRAVENOUS at 13:01

## 2020-01-01 RX ADMIN — METOPROLOL TARTRATE 25 MG: 25 TABLET, FILM COATED ORAL at 22:45

## 2020-01-01 RX ADMIN — Medication 1000 MCG: at 09:18

## 2020-01-01 RX ADMIN — FAMOTIDINE 20 MG: 10 INJECTION INTRAVENOUS at 08:05

## 2020-01-01 RX ADMIN — DILTIAZEM HYDROCHLORIDE 30 MG: 30 TABLET, FILM COATED ORAL at 05:11

## 2020-01-01 RX ADMIN — FUROSEMIDE 20 MG: 10 INJECTION, SOLUTION INTRAMUSCULAR; INTRAVENOUS at 08:26

## 2020-01-01 RX ADMIN — OXYCODONE HYDROCHLORIDE AND ACETAMINOPHEN 500 MG: 500 TABLET ORAL at 20:50

## 2020-01-01 RX ADMIN — OXYCODONE HYDROCHLORIDE AND ACETAMINOPHEN 500 MG: 500 TABLET ORAL at 10:24

## 2020-01-01 RX ADMIN — BUDESONIDE AND FORMOTEROL FUMARATE DIHYDRATE 2 PUFF: 160; 4.5 AEROSOL RESPIRATORY (INHALATION) at 11:09

## 2020-01-01 RX ADMIN — ATORVASTATIN CALCIUM 10 MG: 10 TABLET, FILM COATED ORAL at 20:33

## 2020-01-01 RX ADMIN — INSULIN DETEMIR 10 UNITS: 100 INJECTION, SOLUTION SUBCUTANEOUS at 09:30

## 2020-01-01 RX ADMIN — LEVOTHYROXINE SODIUM 100 MCG: 100 TABLET ORAL at 08:15

## 2020-01-01 RX ADMIN — INSULIN LISPRO 2 UNITS: 100 INJECTION, SOLUTION INTRAVENOUS; SUBCUTANEOUS at 11:17

## 2020-01-01 RX ADMIN — MORPHINE SULFATE 2 MG: 2 INJECTION, SOLUTION INTRAMUSCULAR; INTRAVENOUS at 23:52

## 2020-01-01 RX ADMIN — INSULIN LISPRO 2 UNITS: 100 INJECTION, SOLUTION INTRAVENOUS; SUBCUTANEOUS at 12:01

## 2020-01-01 RX ADMIN — LINEZOLID 600 MG: 600 INJECTION, SOLUTION INTRAVENOUS at 17:57

## 2020-01-01 RX ADMIN — ENOXAPARIN SODIUM 60 MG: 60 INJECTION SUBCUTANEOUS at 20:56

## 2020-01-01 RX ADMIN — IPRATROPIUM BROMIDE 2 PUFF: 17 AEROSOL, METERED RESPIRATORY (INHALATION) at 14:17

## 2020-01-01 RX ADMIN — Medication 1000 MCG: at 10:02

## 2020-01-01 RX ADMIN — SODIUM CHLORIDE 2 G: 900 INJECTION INTRAVENOUS at 00:21

## 2020-01-01 RX ADMIN — INSULIN LISPRO 3 UNITS: 100 INJECTION, SOLUTION INTRAVENOUS; SUBCUTANEOUS at 05:59

## 2020-01-01 RX ADMIN — LEVOTHYROXINE SODIUM 88 MCG: 88 TABLET ORAL at 05:14

## 2020-01-01 RX ADMIN — METOCLOPRAMIDE HYDROCHLORIDE 5 MG: 5 INJECTION INTRAMUSCULAR; INTRAVENOUS at 15:38

## 2020-01-01 RX ADMIN — ALBUMIN HUMAN 25 G: 0.25 SOLUTION INTRAVENOUS at 22:29

## 2020-01-01 RX ADMIN — FUROSEMIDE 20 MG: 10 INJECTION, SOLUTION INTRAMUSCULAR; INTRAVENOUS at 08:05

## 2020-01-01 RX ADMIN — IPRATROPIUM BROMIDE 2 PUFF: 17 AEROSOL, METERED RESPIRATORY (INHALATION) at 14:26

## 2020-01-01 RX ADMIN — CHOLECALCIFEROL (VITAMIN D3) 25 MCG (1,000 UNIT) TABLET 2000 UNITS: TABLET at 09:40

## 2020-01-01 RX ADMIN — ATORVASTATIN CALCIUM 10 MG: 10 TABLET, FILM COATED ORAL at 20:28

## 2020-01-01 RX ADMIN — METOPROLOL TARTRATE 2.5 MG: 5 INJECTION INTRAVENOUS at 21:10

## 2020-01-01 RX ADMIN — NOREPINEPHRINE BITARTRATE 0.02 MCG/KG/MIN: 1 INJECTION, SOLUTION, CONCENTRATE INTRAVENOUS at 01:34

## 2020-01-01 RX ADMIN — IPRATROPIUM BROMIDE 2 PUFF: 17 AEROSOL, METERED RESPIRATORY (INHALATION) at 10:48

## 2020-01-01 RX ADMIN — GLIPIZIDE 2.5 MG: 5 TABLET ORAL at 08:29

## 2020-01-01 RX ADMIN — INSULIN DETEMIR 10 UNITS: 100 INJECTION, SOLUTION SUBCUTANEOUS at 21:28

## 2020-01-01 RX ADMIN — APIXABAN 5 MG: 5 TABLET, FILM COATED ORAL at 22:45

## 2020-01-01 RX ADMIN — OXYCODONE HYDROCHLORIDE AND ACETAMINOPHEN 500 MG: 500 TABLET ORAL at 09:11

## 2020-01-01 RX ADMIN — METOPROLOL TARTRATE 2.5 MG: 5 INJECTION INTRAVENOUS at 13:29

## 2020-01-01 RX ADMIN — DEXAMETHASONE SODIUM PHOSPHATE 6 MG: 4 INJECTION, SOLUTION INTRAMUSCULAR; INTRAVENOUS at 09:00

## 2020-01-01 RX ADMIN — DEXAMETHASONE SODIUM PHOSPHATE 6 MG: 10 INJECTION, SOLUTION INTRAMUSCULAR; INTRAVENOUS at 10:23

## 2020-01-01 RX ADMIN — DILTIAZEM HYDROCHLORIDE 30 MG: 30 TABLET, FILM COATED ORAL at 00:52

## 2020-01-01 RX ADMIN — PANTOPRAZOLE SODIUM 40 MG: 40 INJECTION, POWDER, FOR SOLUTION INTRAVENOUS at 20:33

## 2020-01-01 RX ADMIN — LACTULOSE 20 G: 20 SOLUTION ORAL at 20:13

## 2020-01-01 RX ADMIN — ENOXAPARIN SODIUM 60 MG: 60 INJECTION SUBCUTANEOUS at 10:49

## 2020-01-01 RX ADMIN — METOPROLOL TARTRATE 2.5 MG: 5 INJECTION INTRAVENOUS at 16:31

## 2020-01-01 RX ADMIN — ZINC SULFATE 220 MG (50 MG) CAPSULE 220 MG: CAPSULE at 08:00

## 2020-01-01 RX ADMIN — BUDESONIDE AND FORMOTEROL FUMARATE DIHYDRATE 2 PUFF: 160; 4.5 AEROSOL RESPIRATORY (INHALATION) at 11:02

## 2020-01-01 RX ADMIN — INSULIN LISPRO 6 UNITS: 100 INJECTION, SOLUTION INTRAVENOUS; SUBCUTANEOUS at 05:13

## 2020-01-01 RX ADMIN — INSULIN LISPRO 2 UNITS: 100 INJECTION, SOLUTION INTRAVENOUS; SUBCUTANEOUS at 11:23

## 2020-01-01 RX ADMIN — LORAZEPAM 1 MG: 2 INJECTION INTRAMUSCULAR; INTRAVENOUS at 06:24

## 2020-01-01 RX ADMIN — NOREPINEPHRINE BITARTRATE 0.14 MCG/KG/MIN: 1 INJECTION INTRAVENOUS at 00:53

## 2020-01-01 RX ADMIN — SODIUM CHLORIDE 2 G: 900 INJECTION INTRAVENOUS at 08:25

## 2020-01-01 RX ADMIN — IPRATROPIUM BROMIDE 2 PUFF: 17 AEROSOL, METERED RESPIRATORY (INHALATION) at 21:01

## 2020-01-01 RX ADMIN — DEXMEDETOMIDINE HYDROCHLORIDE 0.25 MCG/KG/HR: 100 INJECTION, SOLUTION INTRAVENOUS at 21:52

## 2020-01-01 RX ADMIN — HALOPERIDOL LACTATE 1 MG: 5 INJECTION, SOLUTION INTRAMUSCULAR at 13:50

## 2020-01-01 RX ADMIN — IPRATROPIUM BROMIDE 2 PUFF: 17 AEROSOL, METERED RESPIRATORY (INHALATION) at 06:38

## 2020-01-01 RX ADMIN — CHOLECALCIFEROL (VITAMIN D3) 25 MCG (1,000 UNIT) TABLET 2000 UNITS: TABLET at 08:10

## 2020-01-01 RX ADMIN — OXYCODONE HYDROCHLORIDE AND ACETAMINOPHEN 500 MG: 500 TABLET ORAL at 20:01

## 2020-01-01 RX ADMIN — APIXABAN 5 MG: 5 TABLET, FILM COATED ORAL at 08:00

## 2020-01-01 RX ADMIN — LEVOTHYROXINE SODIUM 88 MCG: 88 TABLET ORAL at 05:58

## 2020-01-01 RX ADMIN — GLIPIZIDE 2.5 MG: 5 TABLET ORAL at 08:47

## 2020-01-01 RX ADMIN — DILTIAZEM HYDROCHLORIDE 30 MG: 30 TABLET, FILM COATED ORAL at 17:00

## 2020-01-01 RX ADMIN — Medication 3 MG: at 03:23

## 2020-01-01 RX ADMIN — Medication 3 MG: at 20:25

## 2020-01-01 RX ADMIN — MIDAZOLAM HYDROCHLORIDE 4 MG/HR: 5 INJECTION, SOLUTION INTRAMUSCULAR; INTRAVENOUS at 16:42

## 2020-01-01 RX ADMIN — METOCLOPRAMIDE 10 MG: 5 INJECTION, SOLUTION INTRAMUSCULAR; INTRAVENOUS at 05:58

## 2020-01-01 RX ADMIN — NOREPINEPHRINE BITARTRATE 0.14 MCG/KG/MIN: 1 INJECTION INTRAVENOUS at 16:04

## 2020-01-01 RX ADMIN — PANTOPRAZOLE SODIUM 40 MG: 40 INJECTION, POWDER, FOR SOLUTION INTRAVENOUS at 20:04

## 2020-01-01 RX ADMIN — IPRATROPIUM BROMIDE 2 PUFF: 17 AEROSOL, METERED RESPIRATORY (INHALATION) at 18:37

## 2020-01-01 RX ADMIN — CHOLECALCIFEROL (VITAMIN D3) 25 MCG (1,000 UNIT) TABLET 2000 UNITS: TABLET at 11:36

## 2020-01-01 RX ADMIN — METOCLOPRAMIDE HYDROCHLORIDE 5 MG: 5 INJECTION INTRAMUSCULAR; INTRAVENOUS at 18:01

## 2020-01-01 RX ADMIN — BUDESONIDE AND FORMOTEROL FUMARATE DIHYDRATE 2 PUFF: 160; 4.5 AEROSOL RESPIRATORY (INHALATION) at 19:16

## 2020-01-01 RX ADMIN — LORAZEPAM 1 MG: 2 INJECTION INTRAMUSCULAR; INTRAVENOUS at 05:53

## 2020-01-01 RX ADMIN — INSULIN LISPRO 6 UNITS: 100 INJECTION, SOLUTION INTRAVENOUS; SUBCUTANEOUS at 11:00

## 2020-01-01 RX ADMIN — ENOXAPARIN SODIUM 60 MG: 60 INJECTION SUBCUTANEOUS at 08:26

## 2020-01-01 RX ADMIN — IPRATROPIUM BROMIDE 2 PUFF: 17 AEROSOL, METERED RESPIRATORY (INHALATION) at 14:36

## 2020-01-01 RX ADMIN — IPRATROPIUM BROMIDE 2 PUFF: 17 AEROSOL, METERED RESPIRATORY (INHALATION) at 19:35

## 2020-01-01 RX ADMIN — DEXMEDETOMIDINE HYDROCHLORIDE 0.5 MCG/KG/HR: 4 INJECTION, SOLUTION INTRAVENOUS at 21:17

## 2020-01-01 RX ADMIN — BUDESONIDE AND FORMOTEROL FUMARATE DIHYDRATE 2 PUFF: 160; 4.5 AEROSOL RESPIRATORY (INHALATION) at 18:35

## 2020-01-01 RX ADMIN — IPRATROPIUM BROMIDE 2 PUFF: 17 AEROSOL, METERED RESPIRATORY (INHALATION) at 19:03

## 2020-01-01 RX ADMIN — DEXMEDETOMIDINE HYDROCHLORIDE 0.25 MCG/KG/HR: 100 INJECTION, SOLUTION INTRAVENOUS at 20:51

## 2020-01-01 RX ADMIN — METOCLOPRAMIDE 10 MG: 5 INJECTION, SOLUTION INTRAMUSCULAR; INTRAVENOUS at 00:46

## 2020-01-01 RX ADMIN — IPRATROPIUM BROMIDE 2 PUFF: 17 AEROSOL, METERED RESPIRATORY (INHALATION) at 06:58

## 2020-01-01 RX ADMIN — METOPROLOL TARTRATE 2.5 MG: 5 INJECTION INTRAVENOUS at 16:36

## 2020-01-01 RX ADMIN — INSULIN DETEMIR 10 UNITS: 100 INJECTION, SOLUTION SUBCUTANEOUS at 09:13

## 2020-01-01 RX ADMIN — HYDROCODONE BITARTRATE AND ACETAMINOPHEN 1 TABLET: 5; 325 TABLET ORAL at 18:59

## 2020-01-01 RX ADMIN — LEVOTHYROXINE SODIUM 88 MCG: 88 TABLET ORAL at 06:35

## 2020-01-01 RX ADMIN — ZINC SULFATE 220 MG (50 MG) CAPSULE 220 MG: CAPSULE at 08:48

## 2020-01-01 RX ADMIN — BUDESONIDE AND FORMOTEROL FUMARATE DIHYDRATE 2 PUFF: 160; 4.5 AEROSOL RESPIRATORY (INHALATION) at 18:46

## 2020-01-01 RX ADMIN — CHOLECALCIFEROL (VITAMIN D3) 25 MCG (1,000 UNIT) TABLET 2000 UNITS: TABLET at 08:48

## 2020-01-01 RX ADMIN — INSULIN LISPRO 3 UNITS: 100 INJECTION, SOLUTION INTRAVENOUS; SUBCUTANEOUS at 11:46

## 2020-01-01 RX ADMIN — BUDESONIDE 2 PUFF: 180 AEROSOL, POWDER RESPIRATORY (INHALATION) at 21:13

## 2020-01-01 RX ADMIN — INSULIN DETEMIR 10 UNITS: 100 INJECTION, SOLUTION SUBCUTANEOUS at 21:01

## 2020-01-01 RX ADMIN — POTASSIUM CHLORIDE 20 MEQ: 29.8 INJECTION, SOLUTION INTRAVENOUS at 08:17

## 2020-01-01 RX ADMIN — CHOLECALCIFEROL (VITAMIN D3) 25 MCG (1,000 UNIT) TABLET 2000 UNITS: TABLET at 09:09

## 2020-01-01 RX ADMIN — DILTIAZEM HYDROCHLORIDE 15 MG/HR: 5 INJECTION INTRAVENOUS at 10:13

## 2020-01-01 RX ADMIN — LORAZEPAM 1 MG: 2 INJECTION INTRAMUSCULAR; INTRAVENOUS at 19:57

## 2020-01-01 RX ADMIN — DILTIAZEM HYDROCHLORIDE 30 MG: 30 TABLET, FILM COATED ORAL at 06:17

## 2020-01-01 RX ADMIN — IPRATROPIUM BROMIDE 2 PUFF: 17 AEROSOL, METERED RESPIRATORY (INHALATION) at 19:08

## 2020-01-01 RX ADMIN — INSULIN LISPRO 4 UNITS: 100 INJECTION, SOLUTION INTRAVENOUS; SUBCUTANEOUS at 09:00

## 2020-01-01 RX ADMIN — ATORVASTATIN CALCIUM 10 MG: 10 TABLET, FILM COATED ORAL at 21:01

## 2020-01-01 RX ADMIN — PANTOPRAZOLE SODIUM 40 MG: 40 INJECTION, POWDER, FOR SOLUTION INTRAVENOUS at 21:23

## 2020-01-01 RX ADMIN — DILTIAZEM HYDROCHLORIDE 30 MG: 30 TABLET, FILM COATED ORAL at 11:00

## 2020-01-01 RX ADMIN — INSULIN LISPRO 6 UNITS: 100 INJECTION, SOLUTION INTRAVENOUS; SUBCUTANEOUS at 18:22

## 2020-01-01 RX ADMIN — SODIUM CHLORIDE 2 G: 900 INJECTION INTRAVENOUS at 08:03

## 2020-01-01 RX ADMIN — APIXABAN 5 MG: 5 TABLET, FILM COATED ORAL at 20:06

## 2020-01-01 RX ADMIN — DILTIAZEM HYDROCHLORIDE 5 MG/HR: 5 INJECTION INTRAVENOUS at 11:15

## 2020-01-01 RX ADMIN — OXYCODONE HYDROCHLORIDE AND ACETAMINOPHEN 500 MG: 500 TABLET ORAL at 08:14

## 2020-01-01 RX ADMIN — METOPROLOL TARTRATE 2.5 MG: 5 INJECTION INTRAVENOUS at 20:35

## 2020-01-01 RX ADMIN — DILTIAZEM HYDROCHLORIDE 30 MG: 30 TABLET, FILM COATED ORAL at 00:14

## 2020-01-01 RX ADMIN — INSULIN LISPRO 3 UNITS: 100 INJECTION, SOLUTION INTRAVENOUS; SUBCUTANEOUS at 00:21

## 2020-01-01 RX ADMIN — DILTIAZEM HYDROCHLORIDE 30 MG: 30 TABLET, FILM COATED ORAL at 18:12

## 2020-01-01 RX ADMIN — IPRATROPIUM BROMIDE 2 PUFF: 17 AEROSOL, METERED RESPIRATORY (INHALATION) at 21:03

## 2020-01-01 RX ADMIN — Medication 3 MG: at 20:56

## 2020-01-01 RX ADMIN — ENOXAPARIN SODIUM 60 MG: 60 INJECTION SUBCUTANEOUS at 20:25

## 2020-01-01 RX ADMIN — IPRATROPIUM BROMIDE 2 PUFF: 17 AEROSOL, METERED RESPIRATORY (INHALATION) at 10:34

## 2020-01-01 RX ADMIN — DEXMEDETOMIDINE HYDROCHLORIDE 1.5 MCG/KG/HR: 100 INJECTION, SOLUTION INTRAVENOUS at 10:22

## 2020-01-01 RX ADMIN — BUDESONIDE AND FORMOTEROL FUMARATE DIHYDRATE 2 PUFF: 160; 4.5 AEROSOL RESPIRATORY (INHALATION) at 18:37

## 2020-01-01 RX ADMIN — FAMOTIDINE 20 MG: 10 INJECTION INTRAVENOUS at 21:56

## 2020-01-01 RX ADMIN — SODIUM CHLORIDE 2 G: 900 INJECTION INTRAVENOUS at 17:16

## 2020-01-01 RX ADMIN — BUDESONIDE AND FORMOTEROL FUMARATE DIHYDRATE 2 PUFF: 160; 4.5 AEROSOL RESPIRATORY (INHALATION) at 07:03

## 2020-01-01 RX ADMIN — METOCLOPRAMIDE 10 MG: 5 INJECTION, SOLUTION INTRAMUSCULAR; INTRAVENOUS at 00:52

## 2020-01-01 RX ADMIN — BUDESONIDE 2 PUFF: 180 AEROSOL, POWDER RESPIRATORY (INHALATION) at 06:56

## 2020-01-01 RX ADMIN — ENOXAPARIN SODIUM 60 MG: 60 INJECTION SUBCUTANEOUS at 09:06

## 2020-01-01 RX ADMIN — APIXABAN 5 MG: 5 TABLET, FILM COATED ORAL at 08:29

## 2020-01-01 RX ADMIN — FENTANYL CITRATE 200 MCG/HR: 0.05 INJECTION, SOLUTION INTRAMUSCULAR; INTRAVENOUS at 10:15

## 2020-01-01 RX ADMIN — HYDROMORPHONE HYDROCHLORIDE 0.5 MG: 1 INJECTION, SOLUTION INTRAMUSCULAR; INTRAVENOUS; SUBCUTANEOUS at 23:30

## 2020-01-01 RX ADMIN — CALCIUM GLUCONATE 1 G: 98 INJECTION, SOLUTION INTRAVENOUS at 11:32

## 2020-01-01 RX ADMIN — DILTIAZEM HYDROCHLORIDE 30 MG: 30 TABLET, FILM COATED ORAL at 16:48

## 2020-01-01 RX ADMIN — Medication 3 MG: at 20:17

## 2020-01-01 RX ADMIN — METOPROLOL TARTRATE 2.5 MG: 5 INJECTION INTRAVENOUS at 05:11

## 2020-01-01 RX ADMIN — DILTIAZEM HYDROCHLORIDE 30 MG: 30 TABLET, FILM COATED ORAL at 05:54

## 2020-01-01 RX ADMIN — IPRATROPIUM BROMIDE 2 PUFF: 17 AEROSOL, METERED RESPIRATORY (INHALATION) at 20:49

## 2020-01-01 RX ADMIN — Medication 1000 MCG: at 08:01

## 2020-01-01 RX ADMIN — FAMOTIDINE 20 MG: 10 INJECTION INTRAVENOUS at 08:29

## 2020-01-01 RX ADMIN — METOPROLOL TARTRATE 25 MG: 25 TABLET, FILM COATED ORAL at 08:14

## 2020-01-01 RX ADMIN — INSULIN DETEMIR 10 UNITS: 100 INJECTION, SOLUTION SUBCUTANEOUS at 08:46

## 2020-01-01 RX ADMIN — INSULIN LISPRO 6 UNITS: 100 INJECTION, SOLUTION INTRAVENOUS; SUBCUTANEOUS at 12:38

## 2020-01-01 RX ADMIN — ENOXAPARIN SODIUM 60 MG: 60 INJECTION SUBCUTANEOUS at 09:18

## 2020-01-01 RX ADMIN — DILTIAZEM HYDROCHLORIDE 30 MG: 30 TABLET, FILM COATED ORAL at 23:08

## 2020-01-01 RX ADMIN — INSULIN DETEMIR 10 UNITS: 100 INJECTION, SOLUTION SUBCUTANEOUS at 20:35

## 2020-01-01 RX ADMIN — NOREPINEPHRINE BITARTRATE 0.13 MCG/KG/MIN: 1 INJECTION INTRAVENOUS at 15:07

## 2020-01-01 RX ADMIN — FUROSEMIDE 20 MG: 10 INJECTION, SOLUTION INTRAMUSCULAR; INTRAVENOUS at 12:37

## 2020-01-01 RX ADMIN — INSULIN LISPRO 3 UNITS: 100 INJECTION, SOLUTION INTRAVENOUS; SUBCUTANEOUS at 16:30

## 2020-01-01 RX ADMIN — LEVOTHYROXINE SODIUM 88 MCG: 88 TABLET ORAL at 05:23

## 2020-01-01 RX ADMIN — NOREPINEPHRINE BITARTRATE 0.14 MCG/KG/MIN: 1 INJECTION INTRAVENOUS at 08:04

## 2020-01-01 RX ADMIN — METOPROLOL TARTRATE 2.5 MG: 5 INJECTION INTRAVENOUS at 01:40

## 2020-01-01 RX ADMIN — INSULIN DETEMIR 10 UNITS: 100 INJECTION, SOLUTION SUBCUTANEOUS at 20:04

## 2020-01-01 RX ADMIN — SODIUM CHLORIDE 2 G: 900 INJECTION INTRAVENOUS at 08:10

## 2020-01-01 RX ADMIN — FUROSEMIDE 20 MG: 10 INJECTION, SOLUTION INTRAMUSCULAR; INTRAVENOUS at 21:00

## 2020-01-01 RX ADMIN — DILTIAZEM HYDROCHLORIDE 5 MG/HR: 5 INJECTION INTRAVENOUS at 01:36

## 2020-01-01 RX ADMIN — INSULIN LISPRO 5 UNITS: 100 INJECTION, SOLUTION INTRAVENOUS; SUBCUTANEOUS at 05:58

## 2020-01-01 RX ADMIN — OXYCODONE HYDROCHLORIDE AND ACETAMINOPHEN 500 MG: 500 TABLET ORAL at 21:04

## 2020-01-01 RX ADMIN — Medication 1000 MCG: at 08:48

## 2020-01-01 RX ADMIN — METOCLOPRAMIDE 10 MG: 5 INJECTION, SOLUTION INTRAMUSCULAR; INTRAVENOUS at 12:01

## 2020-01-01 RX ADMIN — DEXAMETHASONE SODIUM PHOSPHATE 6 MG: 4 INJECTION, SOLUTION INTRAMUSCULAR; INTRAVENOUS at 09:05

## 2020-01-01 RX ADMIN — CLINDAMYCIN IN 5 PERCENT DEXTROSE 900 MG: 18 INJECTION, SOLUTION INTRAVENOUS at 14:30

## 2020-01-01 RX ADMIN — METOPROLOL TARTRATE 2.5 MG: 5 INJECTION INTRAVENOUS at 12:51

## 2020-01-01 RX ADMIN — ZINC SULFATE 220 MG (50 MG) CAPSULE 220 MG: CAPSULE at 19:12

## 2020-01-01 RX ADMIN — PANTOPRAZOLE SODIUM 40 MG: 40 INJECTION, POWDER, FOR SOLUTION INTRAVENOUS at 20:56

## 2020-01-01 RX ADMIN — DEXAMETHASONE SODIUM PHOSPHATE 10 MG: 10 INJECTION, SOLUTION INTRAMUSCULAR; INTRAVENOUS at 11:17

## 2020-01-01 RX ADMIN — FENTANYL CITRATE 200 MCG/HR: 0.05 INJECTION, SOLUTION INTRAMUSCULAR; INTRAVENOUS at 04:47

## 2020-01-01 RX ADMIN — IPRATROPIUM BROMIDE 2 PUFF: 17 AEROSOL, METERED RESPIRATORY (INHALATION) at 10:18

## 2020-01-01 RX ADMIN — DILTIAZEM HYDROCHLORIDE 30 MG: 30 TABLET, FILM COATED ORAL at 06:22

## 2020-01-01 RX ADMIN — IPRATROPIUM BROMIDE 2 PUFF: 17 AEROSOL, METERED RESPIRATORY (INHALATION) at 18:46

## 2020-01-01 RX ADMIN — INSULIN LISPRO 4 UNITS: 100 INJECTION, SOLUTION INTRAVENOUS; SUBCUTANEOUS at 06:03

## 2020-01-01 RX ADMIN — DEXMEDETOMIDINE HYDROCHLORIDE 1.3 MCG/KG/HR: 100 INJECTION, SOLUTION INTRAVENOUS at 03:06

## 2020-01-01 RX ADMIN — DILTIAZEM HYDROCHLORIDE 60 MG: 60 TABLET, FILM COATED ORAL at 17:17

## 2020-01-01 RX ADMIN — LORAZEPAM 2 MG: 2 INJECTION INTRAMUSCULAR at 06:52

## 2020-01-01 RX ADMIN — FUROSEMIDE 20 MG: 10 INJECTION, SOLUTION INTRAMUSCULAR; INTRAVENOUS at 08:00

## 2020-01-01 RX ADMIN — DILTIAZEM HYDROCHLORIDE 30 MG: 30 TABLET, FILM COATED ORAL at 11:51

## 2020-01-01 RX ADMIN — ATORVASTATIN CALCIUM 10 MG: 10 TABLET, FILM COATED ORAL at 20:51

## 2020-01-01 RX ADMIN — ZINC SULFATE 220 MG (50 MG) CAPSULE 220 MG: CAPSULE at 08:05

## 2020-01-01 RX ADMIN — METOPROLOL TARTRATE 2.5 MG: 5 INJECTION INTRAVENOUS at 06:46

## 2020-01-01 RX ADMIN — OXYCODONE HYDROCHLORIDE AND ACETAMINOPHEN 500 MG: 500 TABLET ORAL at 21:23

## 2020-01-01 RX ADMIN — METOPROLOL TARTRATE 2.5 MG: 5 INJECTION INTRAVENOUS at 21:58

## 2020-01-01 RX ADMIN — BISACODYL 10 MG: 10 SUPPOSITORY RECTAL at 08:48

## 2020-01-01 RX ADMIN — DILTIAZEM HYDROCHLORIDE 30 MG: 30 TABLET, FILM COATED ORAL at 06:05

## 2020-01-01 RX ADMIN — BUDESONIDE 2 PUFF: 180 AEROSOL, POWDER RESPIRATORY (INHALATION) at 06:38

## 2020-01-01 RX ADMIN — LINEZOLID 600 MG: 600 INJECTION, SOLUTION INTRAVENOUS at 06:07

## 2020-01-01 RX ADMIN — DEXMEDETOMIDINE HYDROCHLORIDE 0.3 MCG/KG/HR: 100 INJECTION, SOLUTION INTRAVENOUS at 00:09

## 2020-01-01 RX ADMIN — ZINC SULFATE 220 MG (50 MG) CAPSULE 220 MG: CAPSULE at 09:11

## 2020-01-01 RX ADMIN — BUDESONIDE AND FORMOTEROL FUMARATE DIHYDRATE 2 PUFF: 160; 4.5 AEROSOL RESPIRATORY (INHALATION) at 06:10

## 2020-01-01 RX ADMIN — OXYCODONE HYDROCHLORIDE AND ACETAMINOPHEN 500 MG: 500 TABLET ORAL at 20:28

## 2020-01-01 RX ADMIN — PANTOPRAZOLE SODIUM 40 MG: 40 INJECTION, POWDER, FOR SOLUTION INTRAVENOUS at 10:23

## 2020-01-01 RX ADMIN — Medication 1 PACKET: at 23:02

## 2020-01-01 RX ADMIN — METOPROLOL TARTRATE 2.5 MG: 5 INJECTION INTRAVENOUS at 17:30

## 2020-01-01 RX ADMIN — ATORVASTATIN CALCIUM 10 MG: 10 TABLET, FILM COATED ORAL at 20:56

## 2020-01-01 RX ADMIN — METOPROLOL TARTRATE 2.5 MG: 5 INJECTION INTRAVENOUS at 02:28

## 2020-01-01 RX ADMIN — CHOLECALCIFEROL (VITAMIN D3) 25 MCG (1,000 UNIT) TABLET 2000 UNITS: TABLET at 08:01

## 2020-01-01 RX ADMIN — FAMOTIDINE 20 MG: 20 TABLET, FILM COATED ORAL at 22:45

## 2020-01-01 RX ADMIN — ATORVASTATIN CALCIUM 10 MG: 10 TABLET, FILM COATED ORAL at 20:17

## 2020-01-01 RX ADMIN — DEXMEDETOMIDINE HYDROCHLORIDE 1.2 MCG/KG/HR: 100 INJECTION, SOLUTION INTRAVENOUS at 00:22

## 2020-01-01 RX ADMIN — SODIUM POLYSTYRENE SULFONATE 15 G: 15 SUSPENSION ORAL; RECTAL at 08:47

## 2020-01-01 RX ADMIN — METOPROLOL TARTRATE 2.5 MG: 5 INJECTION INTRAVENOUS at 01:48

## 2020-01-01 RX ADMIN — CHOLECALCIFEROL (VITAMIN D3) 25 MCG (1,000 UNIT) TABLET 2000 UNITS: TABLET at 08:46

## 2020-01-01 RX ADMIN — REMDESIVIR 100 MG: 100 INJECTION, POWDER, LYOPHILIZED, FOR SOLUTION INTRAVENOUS at 13:21

## 2020-01-01 RX ADMIN — INSULIN LISPRO 3 UNITS: 100 INJECTION, SOLUTION INTRAVENOUS; SUBCUTANEOUS at 06:00

## 2020-01-01 RX ADMIN — INSULIN LISPRO 2 UNITS: 100 INJECTION, SOLUTION INTRAVENOUS; SUBCUTANEOUS at 17:41

## 2020-01-01 RX ADMIN — LINEZOLID 600 MG: 600 INJECTION, SOLUTION INTRAVENOUS at 18:09

## 2020-01-01 RX ADMIN — FUROSEMIDE 20 MG: 10 INJECTION, SOLUTION INTRAMUSCULAR; INTRAVENOUS at 09:00

## 2020-01-01 RX ADMIN — INSULIN LISPRO 4 UNITS: 100 INJECTION, SOLUTION INTRAVENOUS; SUBCUTANEOUS at 00:14

## 2020-01-01 RX ADMIN — OXYCODONE HYDROCHLORIDE AND ACETAMINOPHEN 500 MG: 500 TABLET ORAL at 20:55

## 2020-01-01 RX ADMIN — METOPROLOL TARTRATE 2.5 MG: 5 INJECTION INTRAVENOUS at 16:44

## 2020-01-01 RX ADMIN — Medication 3 MG: at 22:52

## 2020-01-01 RX ADMIN — OXYCODONE HYDROCHLORIDE AND ACETAMINOPHEN 500 MG: 500 TABLET ORAL at 08:06

## 2020-01-01 RX ADMIN — METOPROLOL TARTRATE 2.5 MG: 5 INJECTION INTRAVENOUS at 12:39

## 2020-01-01 RX ADMIN — INSULIN DETEMIR 10 UNITS: 100 INJECTION, SOLUTION SUBCUTANEOUS at 20:57

## 2020-01-01 RX ADMIN — Medication 1 PACKET: at 15:53

## 2020-01-01 RX ADMIN — OXYCODONE HYDROCHLORIDE AND ACETAMINOPHEN 500 MG: 500 TABLET ORAL at 08:48

## 2020-01-01 RX ADMIN — LEVOTHYROXINE SODIUM 88 MCG: 88 TABLET ORAL at 06:47

## 2020-01-01 RX ADMIN — FUROSEMIDE 20 MG: 10 INJECTION, SOLUTION INTRAMUSCULAR; INTRAVENOUS at 21:05

## 2020-01-01 RX ADMIN — ZINC SULFATE 220 MG (50 MG) CAPSULE 220 MG: CAPSULE at 08:46

## 2020-01-01 RX ADMIN — ATORVASTATIN CALCIUM 10 MG: 10 TABLET, FILM COATED ORAL at 20:55

## 2020-01-01 RX ADMIN — BUDESONIDE 2 PUFF: 180 AEROSOL, POWDER RESPIRATORY (INHALATION) at 21:03

## 2020-01-01 RX ADMIN — DEXTROSE MONOHYDRATE 25 G: 25 INJECTION, SOLUTION INTRAVENOUS at 08:02

## 2020-01-01 RX ADMIN — Medication 1000 MCG: at 08:55

## 2020-01-01 RX ADMIN — FAMOTIDINE 20 MG: 20 TABLET, FILM COATED ORAL at 08:15

## 2020-01-01 RX ADMIN — APIXABAN 5 MG: 5 TABLET, FILM COATED ORAL at 22:52

## 2020-01-01 RX ADMIN — SODIUM CHLORIDE 2 G: 900 INJECTION INTRAVENOUS at 00:09

## 2020-01-01 RX ADMIN — OXYCODONE HYDROCHLORIDE AND ACETAMINOPHEN 500 MG: 500 TABLET ORAL at 09:18

## 2020-01-01 RX ADMIN — ENOXAPARIN SODIUM 60 MG: 60 INJECTION SUBCUTANEOUS at 08:11

## 2020-01-01 RX ADMIN — INSULIN LISPRO 4 UNITS: 100 INJECTION, SOLUTION INTRAVENOUS; SUBCUTANEOUS at 06:36

## 2020-01-01 RX ADMIN — METOPROLOL TARTRATE 2.5 MG: 5 INJECTION INTRAVENOUS at 04:52

## 2020-01-01 RX ADMIN — METOCLOPRAMIDE 10 MG: 5 INJECTION, SOLUTION INTRAMUSCULAR; INTRAVENOUS at 06:45

## 2020-01-01 RX ADMIN — BUDESONIDE AND FORMOTEROL FUMARATE DIHYDRATE 2 PUFF: 160; 4.5 AEROSOL RESPIRATORY (INHALATION) at 06:29

## 2020-01-01 RX ADMIN — DEXMEDETOMIDINE HYDROCHLORIDE 0.4 MCG/KG/HR: 100 INJECTION, SOLUTION INTRAVENOUS at 17:40

## 2020-01-01 RX ADMIN — BUDESONIDE 2 PUFF: 180 AEROSOL, POWDER RESPIRATORY (INHALATION) at 22:11

## 2020-01-01 RX ADMIN — PANTOPRAZOLE SODIUM 40 MG: 40 INJECTION, POWDER, FOR SOLUTION INTRAVENOUS at 20:51

## 2020-01-01 RX ADMIN — INSULIN DETEMIR 10 UNITS: 100 INJECTION, SOLUTION SUBCUTANEOUS at 08:58

## 2020-01-01 RX ADMIN — ONDANSETRON 4 MG: 4 TABLET, ORALLY DISINTEGRATING ORAL at 09:57

## 2020-01-01 RX ADMIN — LEVOTHYROXINE SODIUM 88 MCG: 88 TABLET ORAL at 06:22

## 2020-01-01 RX ADMIN — LINEZOLID 600 MG: 600 INJECTION, SOLUTION INTRAVENOUS at 17:38

## 2020-01-01 RX ADMIN — BISACODYL 10 MG: 10 SUPPOSITORY RECTAL at 08:01

## 2020-01-01 RX ADMIN — SODIUM CHLORIDE 2 G: 900 INJECTION INTRAVENOUS at 09:06

## 2020-01-01 RX ADMIN — OXYCODONE HYDROCHLORIDE AND ACETAMINOPHEN 500 MG: 500 TABLET ORAL at 08:00

## 2020-01-01 RX ADMIN — SODIUM CHLORIDE 30 ML/HR: 9 INJECTION, SOLUTION INTRAVENOUS at 00:41

## 2020-01-01 RX ADMIN — ZINC SULFATE 220 MG (50 MG) CAPSULE 220 MG: CAPSULE at 10:24

## 2020-01-01 RX ADMIN — CHOLECALCIFEROL (VITAMIN D3) 25 MCG (1,000 UNIT) TABLET 2000 UNITS: TABLET at 11:13

## 2020-01-01 RX ADMIN — METOPROLOL TARTRATE 2.5 MG: 5 INJECTION INTRAVENOUS at 05:53

## 2020-01-01 RX ADMIN — ENOXAPARIN SODIUM 60 MG: 60 INJECTION SUBCUTANEOUS at 08:04

## 2020-01-01 RX ADMIN — FENTANYL CITRATE 125 MCG/HR: 0.05 INJECTION, SOLUTION INTRAMUSCULAR; INTRAVENOUS at 13:37

## 2020-01-01 RX ADMIN — INSULIN LISPRO 8 UNITS: 100 INJECTION, SOLUTION INTRAVENOUS; SUBCUTANEOUS at 17:35

## 2020-01-01 RX ADMIN — Medication 1000 MCG: at 08:15

## 2020-01-01 RX ADMIN — DILTIAZEM HYDROCHLORIDE 30 MG: 30 TABLET, FILM COATED ORAL at 17:45

## 2020-01-01 RX ADMIN — FUROSEMIDE 20 MG: 10 INJECTION, SOLUTION INTRAMUSCULAR; INTRAVENOUS at 20:01

## 2020-01-01 RX ADMIN — LORAZEPAM 1 MG: 2 INJECTION INTRAMUSCULAR; INTRAVENOUS at 22:13

## 2020-01-01 RX ADMIN — INSULIN LISPRO 2 UNITS: 100 INJECTION, SOLUTION INTRAVENOUS; SUBCUTANEOUS at 12:16

## 2020-01-01 RX ADMIN — Medication 1000 MCG: at 08:06

## 2020-01-01 RX ADMIN — CLINDAMYCIN IN 5 PERCENT DEXTROSE 900 MG: 18 INJECTION, SOLUTION INTRAVENOUS at 20:50

## 2020-01-01 RX ADMIN — Medication 3 MG: at 21:04

## 2020-01-01 RX ADMIN — IPRATROPIUM BROMIDE 2 PUFF: 17 AEROSOL, METERED RESPIRATORY (INHALATION) at 07:09

## 2020-01-01 RX ADMIN — DEXMEDETOMIDINE HYDROCHLORIDE 0.2 MCG/KG/HR: 100 INJECTION, SOLUTION INTRAVENOUS at 19:59

## 2020-01-01 RX ADMIN — INSULIN DETEMIR 10 UNITS: 100 INJECTION, SOLUTION SUBCUTANEOUS at 08:07

## 2020-01-01 RX ADMIN — INSULIN LISPRO 2 UNITS: 100 INJECTION, SOLUTION INTRAVENOUS; SUBCUTANEOUS at 08:29

## 2020-01-01 RX ADMIN — SODIUM CHLORIDE 2 G: 900 INJECTION INTRAVENOUS at 23:03

## 2020-01-01 RX ADMIN — INSULIN LISPRO 4 UNITS: 100 INJECTION, SOLUTION INTRAVENOUS; SUBCUTANEOUS at 11:41

## 2020-01-01 RX ADMIN — INSULIN LISPRO 4 UNITS: 100 INJECTION, SOLUTION INTRAVENOUS; SUBCUTANEOUS at 17:41

## 2020-01-01 RX ADMIN — DILTIAZEM HYDROCHLORIDE 60 MG: 60 TABLET, FILM COATED ORAL at 23:05

## 2020-01-01 RX ADMIN — Medication 1000 MCG: at 08:04

## 2020-01-01 RX ADMIN — HYDROCODONE BITARTRATE AND ACETAMINOPHEN 1 TABLET: 5; 325 TABLET ORAL at 23:25

## 2020-01-01 RX ADMIN — ATORVASTATIN CALCIUM 10 MG: 10 TABLET, FILM COATED ORAL at 20:13

## 2020-01-01 RX ADMIN — INSULIN LISPRO 4 UNITS: 100 INJECTION, SOLUTION INTRAVENOUS; SUBCUTANEOUS at 00:30

## 2020-01-01 RX ADMIN — DEXMEDETOMIDINE HYDROCHLORIDE 1 MCG/KG/HR: 100 INJECTION, SOLUTION INTRAVENOUS at 15:39

## 2020-01-01 RX ADMIN — ENOXAPARIN SODIUM 70 MG: 80 INJECTION SUBCUTANEOUS at 11:17

## 2020-01-01 RX ADMIN — HALOPERIDOL LACTATE 1 MG: 5 INJECTION, SOLUTION INTRAMUSCULAR at 13:16

## 2020-01-01 RX ADMIN — INSULIN LISPRO 4 UNITS: 100 INJECTION, SOLUTION INTRAVENOUS; SUBCUTANEOUS at 11:54

## 2020-01-01 RX ADMIN — INSULIN LISPRO 4 UNITS: 100 INJECTION, SOLUTION INTRAVENOUS; SUBCUTANEOUS at 05:20

## 2020-01-01 RX ADMIN — DEXTROSE MONOHYDRATE 50 ML/HR: 50 INJECTION, SOLUTION INTRAVENOUS at 07:33

## 2020-01-01 RX ADMIN — DILTIAZEM HYDROCHLORIDE 30 MG: 30 TABLET, FILM COATED ORAL at 23:29

## 2020-01-01 RX ADMIN — MIDAZOLAM HYDROCHLORIDE 1 MG/HR: 5 INJECTION, SOLUTION INTRAMUSCULAR; INTRAVENOUS at 01:08

## 2020-01-01 RX ADMIN — IPRATROPIUM BROMIDE 2 PUFF: 17 AEROSOL, METERED RESPIRATORY (INHALATION) at 10:27

## 2020-01-01 RX ADMIN — IPRATROPIUM BROMIDE 2 PUFF: 17 AEROSOL, METERED RESPIRATORY (INHALATION) at 14:59

## 2020-01-01 RX ADMIN — DILTIAZEM HYDROCHLORIDE 30 MG: 30 TABLET, FILM COATED ORAL at 17:07

## 2020-01-01 RX ADMIN — CHOLECALCIFEROL (VITAMIN D3) 25 MCG (1,000 UNIT) TABLET 2000 UNITS: TABLET at 09:20

## 2020-01-01 RX ADMIN — OXYCODONE HYDROCHLORIDE AND ACETAMINOPHEN 500 MG: 500 TABLET ORAL at 20:14

## 2020-01-01 RX ADMIN — METOCLOPRAMIDE 10 MG: 5 INJECTION, SOLUTION INTRAMUSCULAR; INTRAVENOUS at 17:53

## 2020-01-01 RX ADMIN — BISACODYL 10 MG: 10 SUPPOSITORY RECTAL at 08:11

## 2020-01-01 RX ADMIN — OXYCODONE HYDROCHLORIDE AND ACETAMINOPHEN 500 MG: 500 TABLET ORAL at 20:56

## 2020-01-01 RX ADMIN — LEVOTHYROXINE SODIUM 88 MCG: 88 TABLET ORAL at 06:27

## 2020-01-01 RX ADMIN — SODIUM CHLORIDE 2 G: 900 INJECTION INTRAVENOUS at 09:18

## 2020-01-01 RX ADMIN — METOPROLOL TARTRATE 25 MG: 25 TABLET, FILM COATED ORAL at 22:52

## 2020-01-01 RX ADMIN — LEVOTHYROXINE SODIUM 88 MCG: 88 TABLET ORAL at 06:05

## 2020-01-01 RX ADMIN — INSULIN LISPRO 4 UNITS: 100 INJECTION, SOLUTION INTRAVENOUS; SUBCUTANEOUS at 18:32

## 2020-01-01 RX ADMIN — FENTANYL CITRATE 200 MCG/HR: 0.05 INJECTION, SOLUTION INTRAMUSCULAR; INTRAVENOUS at 21:38

## 2020-01-01 RX ADMIN — CHOLECALCIFEROL (VITAMIN D3) 25 MCG (1,000 UNIT) TABLET 2000 UNITS: TABLET at 08:06

## 2020-01-01 RX ADMIN — SODIUM CHLORIDE 2 G: 900 INJECTION INTRAVENOUS at 23:54

## 2020-01-01 RX ADMIN — DILTIAZEM HYDROCHLORIDE 30 MG: 30 TABLET, FILM COATED ORAL at 00:27

## 2020-01-01 RX ADMIN — OXYCODONE HYDROCHLORIDE AND ACETAMINOPHEN 500 MG: 500 TABLET ORAL at 10:03

## 2020-01-01 RX ADMIN — GLIPIZIDE 2.5 MG: 5 TABLET ORAL at 10:49

## 2020-01-01 RX ADMIN — CLINDAMYCIN IN 5 PERCENT DEXTROSE 900 MG: 18 INJECTION, SOLUTION INTRAVENOUS at 20:53

## 2020-01-01 RX ADMIN — ZINC SULFATE 220 MG (50 MG) CAPSULE 220 MG: CAPSULE at 10:02

## 2020-01-01 RX ADMIN — GLIPIZIDE 2.5 MG: 5 TABLET ORAL at 08:15

## 2020-01-01 RX ADMIN — FAMOTIDINE 20 MG: 10 INJECTION INTRAVENOUS at 08:10

## 2020-01-01 RX ADMIN — ENOXAPARIN SODIUM 70 MG: 80 INJECTION SUBCUTANEOUS at 06:11

## 2020-01-01 RX ADMIN — IPRATROPIUM BROMIDE 2 PUFF: 17 AEROSOL, METERED RESPIRATORY (INHALATION) at 10:53

## 2020-01-01 RX ADMIN — HYDROCODONE BITARTRATE AND ACETAMINOPHEN 1 TABLET: 5; 325 TABLET ORAL at 10:03

## 2020-01-01 RX ADMIN — FUROSEMIDE 20 MG: 10 INJECTION, SOLUTION INTRAMUSCULAR; INTRAVENOUS at 20:08

## 2020-01-01 RX ADMIN — DILTIAZEM HYDROCHLORIDE 30 MG: 30 TABLET, FILM COATED ORAL at 17:38

## 2020-01-01 RX ADMIN — FUROSEMIDE 20 MG: 10 INJECTION, SOLUTION INTRAMUSCULAR; INTRAVENOUS at 11:43

## 2020-01-01 RX ADMIN — CLINDAMYCIN IN 5 PERCENT DEXTROSE 900 MG: 18 INJECTION, SOLUTION INTRAVENOUS at 06:27

## 2020-01-01 RX ADMIN — LEVOTHYROXINE SODIUM 88 MCG: 88 TABLET ORAL at 05:21

## 2020-01-01 RX ADMIN — FENTANYL CITRATE 200 MCG/HR: 0.05 INJECTION, SOLUTION INTRAMUSCULAR; INTRAVENOUS at 18:31

## 2020-01-01 RX ADMIN — CLINDAMYCIN IN 5 PERCENT DEXTROSE 900 MG: 18 INJECTION, SOLUTION INTRAVENOUS at 05:58

## 2020-01-01 RX ADMIN — IPRATROPIUM BROMIDE 2 PUFF: 17 AEROSOL, METERED RESPIRATORY (INHALATION) at 22:10

## 2020-01-01 RX ADMIN — LEVOTHYROXINE SODIUM 88 MCG: 88 TABLET ORAL at 06:01

## 2020-01-01 RX ADMIN — FAMOTIDINE 20 MG: 10 INJECTION INTRAVENOUS at 08:48

## 2020-01-01 RX ADMIN — LEVOTHYROXINE SODIUM 100 MCG: 100 TABLET ORAL at 09:13

## 2020-01-01 RX ADMIN — GLIPIZIDE 2.5 MG: 5 TABLET ORAL at 09:20

## 2020-01-01 RX ADMIN — MORPHINE SULFATE 2 MG: 2 INJECTION, SOLUTION INTRAMUSCULAR; INTRAVENOUS at 02:11

## 2020-01-01 RX ADMIN — Medication 1 PACKET: at 17:17

## 2020-01-01 RX ADMIN — IPRATROPIUM BROMIDE 2 PUFF: 17 AEROSOL, METERED RESPIRATORY (INHALATION) at 06:44

## 2020-01-01 RX ADMIN — METOPROLOL TARTRATE 2.5 MG: 5 INJECTION INTRAVENOUS at 10:02

## 2020-01-01 RX ADMIN — FAMOTIDINE 20 MG: 10 INJECTION INTRAVENOUS at 10:49

## 2020-01-01 RX ADMIN — Medication 1 PACKET: at 08:35

## 2020-01-01 RX ADMIN — FENTANYL CITRATE 50 MCG/HR: 0.05 INJECTION, SOLUTION INTRAMUSCULAR; INTRAVENOUS at 00:07

## 2020-01-01 RX ADMIN — INSULIN LISPRO 2 UNITS: 100 INJECTION, SOLUTION INTRAVENOUS; SUBCUTANEOUS at 00:22

## 2020-01-01 RX ADMIN — OXYCODONE HYDROCHLORIDE AND ACETAMINOPHEN 500 MG: 500 TABLET ORAL at 20:36

## 2020-01-01 RX ADMIN — OXYCODONE HYDROCHLORIDE AND ACETAMINOPHEN 500 MG: 500 TABLET ORAL at 08:34

## 2020-01-01 RX ADMIN — DILTIAZEM HYDROCHLORIDE 30 MG: 30 TABLET, FILM COATED ORAL at 11:03

## 2020-01-01 RX ADMIN — BISACODYL 10 MG: 10 SUPPOSITORY RECTAL at 20:22

## 2020-01-01 RX ADMIN — SODIUM CHLORIDE 2 G: 900 INJECTION INTRAVENOUS at 19:58

## 2020-01-01 RX ADMIN — ZINC SULFATE 220 MG (50 MG) CAPSULE 220 MG: CAPSULE at 08:11

## 2020-01-01 RX ADMIN — ATORVASTATIN CALCIUM 10 MG: 10 TABLET, FILM COATED ORAL at 21:23

## 2020-01-01 RX ADMIN — LORAZEPAM 1 MG: 2 INJECTION INTRAMUSCULAR; INTRAVENOUS at 09:45

## 2020-01-01 RX ADMIN — FENTANYL CITRATE 250 MCG/HR: 0.05 INJECTION, SOLUTION INTRAMUSCULAR; INTRAVENOUS at 15:30

## 2020-01-01 RX ADMIN — INSULIN LISPRO 3 UNITS: 100 INJECTION, SOLUTION INTRAVENOUS; SUBCUTANEOUS at 12:56

## 2020-01-01 RX ADMIN — METOPROLOL TARTRATE 2.5 MG: 5 INJECTION INTRAVENOUS at 02:55

## 2020-01-01 RX ADMIN — FAMOTIDINE 20 MG: 10 INJECTION INTRAVENOUS at 09:18

## 2020-01-01 RX ADMIN — ATORVASTATIN CALCIUM 10 MG: 10 TABLET, FILM COATED ORAL at 20:36

## 2020-01-01 RX ADMIN — DEXMEDETOMIDINE HYDROCHLORIDE 0.2 MCG/KG/HR: 100 INJECTION, SOLUTION INTRAVENOUS at 04:31

## 2020-01-01 RX ADMIN — IPRATROPIUM BROMIDE 2 PUFF: 17 AEROSOL, METERED RESPIRATORY (INHALATION) at 18:35

## 2020-01-01 RX ADMIN — GLIPIZIDE 2.5 MG: 5 TABLET ORAL at 08:00

## 2020-01-01 RX ADMIN — CHOLECALCIFEROL (VITAMIN D3) 25 MCG (1,000 UNIT) TABLET 2000 UNITS: TABLET at 08:03

## 2020-01-01 RX ADMIN — FENTANYL CITRATE 200 MCG/HR: 0.05 INJECTION, SOLUTION INTRAMUSCULAR; INTRAVENOUS at 06:31

## 2020-01-01 RX ADMIN — Medication 3 MG: at 20:13

## 2020-01-01 RX ADMIN — OXYCODONE HYDROCHLORIDE AND ACETAMINOPHEN 500 MG: 500 TABLET ORAL at 20:33

## 2020-01-01 RX ADMIN — DEXMEDETOMIDINE HYDROCHLORIDE 1.5 MCG/KG/HR: 100 INJECTION, SOLUTION INTRAVENOUS at 03:35

## 2020-01-01 RX ADMIN — ZINC SULFATE 220 MG (50 MG) CAPSULE 220 MG: CAPSULE at 09:10

## 2020-01-01 RX ADMIN — INSULIN DETEMIR 10 UNITS: 100 INJECTION, SOLUTION SUBCUTANEOUS at 11:41

## 2020-01-01 RX ADMIN — FUROSEMIDE 20 MG: 10 INJECTION, SOLUTION INTRAMUSCULAR; INTRAVENOUS at 07:55

## 2020-01-01 RX ADMIN — DEXAMETHASONE SODIUM PHOSPHATE 6 MG: 4 INJECTION, SOLUTION INTRAMUSCULAR; INTRAVENOUS at 08:00

## 2020-01-01 RX ADMIN — HYDROCODONE BITARTRATE AND ACETAMINOPHEN 1 TABLET: 5; 325 TABLET ORAL at 08:47

## 2020-01-01 RX ADMIN — METOCLOPRAMIDE HYDROCHLORIDE 5 MG: 5 INJECTION INTRAMUSCULAR; INTRAVENOUS at 00:12

## 2020-01-01 RX ADMIN — NOREPINEPHRINE BITARTRATE 0.08 MCG/KG/MIN: 1 INJECTION INTRAVENOUS at 14:21

## 2020-01-01 RX ADMIN — DEXMEDETOMIDINE HYDROCHLORIDE 0.75 MCG/KG/HR: 4 INJECTION, SOLUTION INTRAVENOUS at 06:38

## 2020-01-01 RX ADMIN — LORAZEPAM 2 MG: 2 INJECTION INTRAMUSCULAR; INTRAVENOUS at 06:52

## 2020-01-01 RX ADMIN — DEXTROSE MONOHYDRATE 50 ML/HR: 50 INJECTION, SOLUTION INTRAVENOUS at 11:32

## 2020-01-01 RX ADMIN — FENTANYL CITRATE 300 MCG/HR: 0.05 INJECTION, SOLUTION INTRAMUSCULAR; INTRAVENOUS at 07:31

## 2020-01-01 RX ADMIN — ENOXAPARIN SODIUM 60 MG: 60 INJECTION SUBCUTANEOUS at 12:55

## 2020-01-01 RX ADMIN — Medication 1000 MCG: at 10:23

## 2020-01-01 RX ADMIN — INSULIN DETEMIR 10 UNITS: 100 INJECTION, SOLUTION SUBCUTANEOUS at 21:24

## 2020-01-01 RX ADMIN — INSULIN LISPRO 6 UNITS: 100 INJECTION, SOLUTION INTRAVENOUS; SUBCUTANEOUS at 17:17

## 2020-01-01 RX ADMIN — DEXMEDETOMIDINE HYDROCHLORIDE 1.5 MCG/KG/HR: 100 INJECTION, SOLUTION INTRAVENOUS at 13:54

## 2020-01-01 RX ADMIN — METOCLOPRAMIDE HYDROCHLORIDE 5 MG: 5 INJECTION INTRAMUSCULAR; INTRAVENOUS at 06:08

## 2020-01-01 RX ADMIN — Medication 3 MG: at 21:01

## 2020-01-01 RX ADMIN — METOPROLOL TARTRATE 2.5 MG: 5 INJECTION INTRAVENOUS at 06:04

## 2020-01-01 RX ADMIN — DEXMEDETOMIDINE HYDROCHLORIDE 0.5 MCG/KG/HR: 100 INJECTION, SOLUTION INTRAVENOUS at 00:59

## 2020-01-01 RX ADMIN — ATORVASTATIN CALCIUM 10 MG: 10 TABLET, FILM COATED ORAL at 22:52

## 2020-01-01 RX ADMIN — BUDESONIDE AND FORMOTEROL FUMARATE DIHYDRATE 2 PUFF: 160; 4.5 AEROSOL RESPIRATORY (INHALATION) at 20:09

## 2020-01-01 RX ADMIN — IPRATROPIUM BROMIDE 2 PUFF: 17 AEROSOL, METERED RESPIRATORY (INHALATION) at 11:14

## 2020-01-01 RX ADMIN — SODIUM CHLORIDE 2 G: 900 INJECTION INTRAVENOUS at 09:00

## 2020-01-01 RX ADMIN — SODIUM CHLORIDE 2 G: 900 INJECTION INTRAVENOUS at 08:30

## 2020-01-01 RX ADMIN — DEXMEDETOMIDINE HYDROCHLORIDE 0.5 MCG/KG/HR: 100 INJECTION, SOLUTION INTRAVENOUS at 14:01

## 2020-01-01 RX ADMIN — METOPROLOL TARTRATE 2.5 MG: 5 INJECTION INTRAVENOUS at 02:44

## 2020-01-01 RX ADMIN — CHOLECALCIFEROL (VITAMIN D3) 25 MCG (1,000 UNIT) TABLET 2000 UNITS: TABLET at 10:03

## 2020-01-01 RX ADMIN — OXYCODONE HYDROCHLORIDE AND ACETAMINOPHEN 500 MG: 500 TABLET ORAL at 22:45

## 2020-01-01 RX ADMIN — ZINC SULFATE 220 MG (50 MG) CAPSULE 220 MG: CAPSULE at 08:01

## 2020-01-01 RX ADMIN — DEXAMETHASONE SODIUM PHOSPHATE 6 MG: 10 INJECTION, SOLUTION INTRAMUSCULAR; INTRAVENOUS at 11:01

## 2020-01-01 RX ADMIN — IPRATROPIUM BROMIDE 2 PUFF: 17 AEROSOL, METERED RESPIRATORY (INHALATION) at 15:22

## 2020-01-01 RX ADMIN — BUDESONIDE AND FORMOTEROL FUMARATE DIHYDRATE 2 PUFF: 160; 4.5 AEROSOL RESPIRATORY (INHALATION) at 06:47

## 2020-01-01 RX ADMIN — METOCLOPRAMIDE HYDROCHLORIDE 5 MG: 5 INJECTION INTRAMUSCULAR; INTRAVENOUS at 18:11

## 2020-01-01 RX ADMIN — IOPAMIDOL 85 ML: 755 INJECTION, SOLUTION INTRAVENOUS at 18:20

## 2020-01-01 RX ADMIN — APIXABAN 5 MG: 5 TABLET, FILM COATED ORAL at 08:14

## 2020-01-01 RX ADMIN — DEXMEDETOMIDINE HYDROCHLORIDE 0.85 MCG/KG/HR: 4 INJECTION, SOLUTION INTRAVENOUS at 08:48

## 2020-01-01 RX ADMIN — ATORVASTATIN CALCIUM 10 MG: 10 TABLET, FILM COATED ORAL at 21:04

## 2020-01-01 RX ADMIN — DEXMEDETOMIDINE HYDROCHLORIDE 0.25 MCG/KG/HR: 100 INJECTION, SOLUTION INTRAVENOUS at 22:38

## 2020-01-01 RX ADMIN — DILTIAZEM HYDROCHLORIDE 30 MG: 30 TABLET, FILM COATED ORAL at 18:38

## 2020-01-01 RX ADMIN — SODIUM CHLORIDE 30 ML/HR: 9 INJECTION, SOLUTION INTRAVENOUS at 10:01

## 2020-01-01 RX ADMIN — OXYCODONE HYDROCHLORIDE AND ACETAMINOPHEN 500 MG: 500 TABLET ORAL at 08:46

## 2020-01-01 RX ADMIN — ATORVASTATIN CALCIUM 10 MG: 10 TABLET, FILM COATED ORAL at 20:03

## 2020-01-01 RX ADMIN — BUDESONIDE AND FORMOTEROL FUMARATE DIHYDRATE 2 PUFF: 160; 4.5 AEROSOL RESPIRATORY (INHALATION) at 12:37

## 2020-01-01 RX ADMIN — METOCLOPRAMIDE 10 MG: 5 INJECTION, SOLUTION INTRAMUSCULAR; INTRAVENOUS at 05:54

## 2020-01-01 RX ADMIN — INSULIN DETEMIR 10 UNITS: 100 INJECTION, SOLUTION SUBCUTANEOUS at 20:46

## 2020-01-01 RX ADMIN — IPRATROPIUM BROMIDE 2 PUFF: 17 AEROSOL, METERED RESPIRATORY (INHALATION) at 06:29

## 2020-01-01 RX ADMIN — FENTANYL CITRATE 200 MCG/HR: 0.05 INJECTION, SOLUTION INTRAMUSCULAR; INTRAVENOUS at 08:14

## 2020-01-01 RX ADMIN — DILTIAZEM HYDROCHLORIDE 30 MG: 30 TABLET, FILM COATED ORAL at 12:33

## 2020-01-01 RX ADMIN — NOREPINEPHRINE BITARTRATE 0.08 MCG/KG/MIN: 1 INJECTION, SOLUTION, CONCENTRATE INTRAVENOUS at 16:43

## 2020-01-01 RX ADMIN — LORAZEPAM 1 MG: 2 INJECTION INTRAMUSCULAR; INTRAVENOUS at 23:42

## 2020-01-01 RX ADMIN — BUDESONIDE AND FORMOTEROL FUMARATE DIHYDRATE 2 PUFF: 160; 4.5 AEROSOL RESPIRATORY (INHALATION) at 19:20

## 2020-01-01 RX ADMIN — GLIPIZIDE 2.5 MG: 5 TABLET ORAL at 08:10

## 2020-01-01 RX ADMIN — SODIUM POLYSTYRENE SULFONATE 15 G: 15 SUSPENSION ORAL; RECTAL at 13:26

## 2020-01-01 RX ADMIN — METOPROLOL TARTRATE 2.5 MG: 5 INJECTION INTRAVENOUS at 16:48

## 2020-01-01 RX ADMIN — DEXTROSE MONOHYDRATE 25 G: 25 INJECTION, SOLUTION INTRAVENOUS at 06:43

## 2020-01-01 RX ADMIN — FAMOTIDINE 20 MG: 20 TABLET, FILM COATED ORAL at 08:00

## 2020-01-01 RX ADMIN — BUDESONIDE 2 PUFF: 180 AEROSOL, POWDER RESPIRATORY (INHALATION) at 19:20

## 2020-01-01 RX ADMIN — DEXMEDETOMIDINE HYDROCHLORIDE 1.5 MCG/KG/HR: 100 INJECTION, SOLUTION INTRAVENOUS at 07:31

## 2020-01-01 RX ADMIN — LINEZOLID 600 MG: 600 INJECTION, SOLUTION INTRAVENOUS at 05:54

## 2020-01-01 RX ADMIN — ATORVASTATIN CALCIUM 10 MG: 10 TABLET, FILM COATED ORAL at 20:24

## 2020-01-01 RX ADMIN — INSULIN HUMAN 10 UNITS: 100 INJECTION, SOLUTION PARENTERAL at 08:47

## 2020-01-01 RX ADMIN — Medication 1 PACKET: at 20:38

## 2020-01-01 RX ADMIN — DEXMEDETOMIDINE HYDROCHLORIDE 1.5 MCG/KG/HR: 100 INJECTION, SOLUTION INTRAVENOUS at 06:48

## 2020-01-01 RX ADMIN — CHOLECALCIFEROL (VITAMIN D3) 25 MCG (1,000 UNIT) TABLET 2000 UNITS: TABLET at 10:24

## 2020-01-01 RX ADMIN — DILTIAZEM HYDROCHLORIDE 30 MG: 30 TABLET, FILM COATED ORAL at 12:41

## 2020-01-01 RX ADMIN — Medication 1000 MCG: at 08:10

## 2020-01-01 RX ADMIN — FAMOTIDINE 20 MG: 10 INJECTION INTRAVENOUS at 09:06

## 2020-01-01 RX ADMIN — INSULIN LISPRO 2 UNITS: 100 INJECTION, SOLUTION INTRAVENOUS; SUBCUTANEOUS at 17:12

## 2020-01-01 RX ADMIN — DILTIAZEM HYDROCHLORIDE 30 MG: 30 TABLET, FILM COATED ORAL at 11:32

## 2020-01-01 RX ADMIN — ZINC SULFATE 220 MG (50 MG) CAPSULE 220 MG: CAPSULE at 09:18

## 2020-01-01 RX ADMIN — OXYCODONE HYDROCHLORIDE AND ACETAMINOPHEN 500 MG: 500 TABLET ORAL at 21:01

## 2020-01-01 RX ADMIN — CHOLECALCIFEROL (VITAMIN D3) 25 MCG (1,000 UNIT) TABLET 2000 UNITS: TABLET at 08:28

## 2020-01-01 RX ADMIN — INSULIN LISPRO 2 UNITS: 100 INJECTION, SOLUTION INTRAVENOUS; SUBCUTANEOUS at 16:32

## 2020-01-01 RX ADMIN — OXYCODONE HYDROCHLORIDE AND ACETAMINOPHEN 500 MG: 500 TABLET ORAL at 19:11

## 2020-01-01 RX ADMIN — CHOLECALCIFEROL (VITAMIN D3) 25 MCG (1,000 UNIT) TABLET 2000 UNITS: TABLET at 08:32

## 2020-01-01 RX ADMIN — ENOXAPARIN SODIUM 60 MG: 60 INJECTION SUBCUTANEOUS at 08:33

## 2020-01-01 RX ADMIN — DEXAMETHASONE SODIUM PHOSPHATE 4 MG: 4 INJECTION, SOLUTION INTRAMUSCULAR; INTRAVENOUS at 08:46

## 2020-01-01 RX ADMIN — IPRATROPIUM BROMIDE 2 PUFF: 17 AEROSOL, METERED RESPIRATORY (INHALATION) at 06:41

## 2020-01-01 RX ADMIN — DILTIAZEM HYDROCHLORIDE 30 MG: 30 TABLET, FILM COATED ORAL at 11:13

## 2020-01-01 RX ADMIN — Medication 3 MG: at 21:23

## 2020-01-01 RX ADMIN — OXYCODONE HYDROCHLORIDE AND ACETAMINOPHEN 500 MG: 500 TABLET ORAL at 10:49

## 2020-01-01 RX ADMIN — DILTIAZEM HYDROCHLORIDE 30 MG: 30 TABLET, FILM COATED ORAL at 23:10

## 2020-01-01 RX ADMIN — ZINC SULFATE 220 MG (50 MG) CAPSULE 220 MG: CAPSULE at 08:34

## 2020-01-01 RX ADMIN — METOPROLOL TARTRATE 2.5 MG: 5 INJECTION INTRAVENOUS at 08:34

## 2020-01-01 RX ADMIN — METOCLOPRAMIDE 10 MG: 5 INJECTION, SOLUTION INTRAMUSCULAR; INTRAVENOUS at 18:30

## 2020-01-01 RX ADMIN — CHOLECALCIFEROL (VITAMIN D3) 25 MCG (1,000 UNIT) TABLET 2000 UNITS: TABLET at 19:11

## 2020-01-01 RX ADMIN — INSULIN LISPRO 6 UNITS: 100 INJECTION, SOLUTION INTRAVENOUS; SUBCUTANEOUS at 17:00

## 2020-05-25 NOTE — DISCHARGE INSTRUCTIONS
Close follow up with pcp for re-evaluation of lungs and pain; will need to return with any acute or worsening sxs.   May take over the counter NSAIDs such as ibuprofen or aleve for the rib pain as well

## 2020-05-25 NOTE — ED PROVIDER NOTES
Subjective     Fall   Mechanism of injury: fall    Injury location:  Torso  Torso injury location:  R chest  Incident location:  Home  Time since incident:  2 days  Arrived directly from scene: no    Fall:     Fall occurred:  Tripped and walking  Prior to arrival data:     Blood loss:  None    Loss of consciousness: no    Associated symptoms: back pain    Associated symptoms: no abdominal pain, no chest pain, no loss of consciousness, no neck pain and no vomiting        Review of Systems   Constitutional: Negative.    HENT: Negative.    Respiratory:        Reports right rib pain   Cardiovascular: Negative.  Negative for chest pain.   Gastrointestinal: Negative for abdominal pain and vomiting.   Musculoskeletal: Positive for back pain. Negative for neck pain.   Skin: Negative.    Neurological: Negative for loss of consciousness.   All other systems reviewed and are negative.      Past Medical History:   Diagnosis Date   • Diabetes mellitus (CMS/HCC)    • Dysrhythmia, cardiac    • History of pericarditis    • Hypertension    • Hypothyroid    • Palpitations     : PAR AFIB   • Paroxysmal atrial tachycardia (CMS/HCC)    • Pericardial disease        Allergies   Allergen Reactions   • Levaquin [Levofloxacin] Rash   • Rocephin [Ceftriaxone] Rash   • Vancomycin Rash       Past Surgical History:   Procedure Laterality Date   • BREAST CYST EXCISION     • PERICARDIAL WINDOW         Family History   Problem Relation Age of Onset   • Cancer Mother    • Cancer Father    • Cancer Sister    • Cancer Brother    • Cancer Sister        Social History     Socioeconomic History   • Marital status:      Spouse name: Not on file   • Number of children: Not on file   • Years of education: Not on file   • Highest education level: Not on file   Tobacco Use   • Smoking status: Former Smoker   • Smokeless tobacco: Never Used   • Tobacco comment: quit 40 years ago   Substance and Sexual Activity   • Alcohol use: No   • Drug use: No            Objective   Physical Exam   Constitutional: She is oriented to person, place, and time. She appears well-developed and well-nourished.   HENT:   Head: Normocephalic and atraumatic.   Right Ear: External ear normal.   Left Ear: External ear normal.   Nose: Nose normal.   Eyes: Pupils are equal, round, and reactive to light. Conjunctivae and EOM are normal.   Neck: Normal range of motion. Neck supple.   Cardiovascular: Normal rate, regular rhythm, normal heart sounds and intact distal pulses.   Pulmonary/Chest: Effort normal and breath sounds normal. She exhibits tenderness.   Pain to palpation to the right posterior rib region with small bruise noted; no crepitus noted on exam   Abdominal: Soft. Bowel sounds are normal.   Musculoskeletal: Normal range of motion. She exhibits tenderness.   Pain to palpation to the thoracic and lumbar spine without step off or vertebral point tenderness noted; sensory intact, neurovascular intact, motor strength intact; no loss of bowel or bladder control, no saddle anesthesia   Neurological: She is alert and oriented to person, place, and time.   [place]   Skin: Skin is warm and dry. Capillary refill takes less than 2 seconds.   Psychiatric: She has a normal mood and affect. Her behavior is normal. Judgment and thought content normal.   Nursing note and vitals reviewed.      Procedures           ED Course  ED Course as of May 25 1216   Mon May 25, 2020   1105 Xray noted bilateral diffuse reticulonodular lung markings with additional peribronchial thickening. No consolidation noted. Underlying infectious bronchiolitis/bronchopneumonia possible. Patient likely has been splinting secondary to the fall and rib pain. We will treat for possible bronchial pneumonia. Lungs appear clear on exam and no indication of fluid overload. Discussed with patient that she will need close follow up with pcp and/or return with any worsening sxs. Reviewed with Dr. Diop who agrees with treatment  plan.     [TW]      ED Course User Index  [TW] Fela Hernandes, APRN                                           MDM  Number of Diagnoses or Management Options  Bronchial pneumonia: new and requires workup  Contusion of rib on right side, initial encounter: new and requires workup  Fall, initial encounter: new and requires workup     Amount and/or Complexity of Data Reviewed  Tests in the radiology section of CPT®: ordered and reviewed  Discuss the patient with other providers: yes    Risk of Complications, Morbidity, and/or Mortality  Presenting problems: moderate  Diagnostic procedures: moderate  Management options: moderate    Patient Progress  Patient progress: improved      Final diagnoses:   Fall, initial encounter   Contusion of rib on right side, initial encounter   Bronchial pneumonia            Fela Hernandes, APRN  05/25/20 1216

## 2020-09-16 NOTE — PROGRESS NOTES
Priya Mobley is a 89 y.o. female. Fu of rhythm    History of Present Illness     HX PAT:  Has no significant palpitations since LOV. No spells of soa or light-headedness. Is active for her age. Is compliant with meds that are well tolerated. EKG is nsc today.    HTN:  Checks at home and clinic are to goal.        The following portions of the patient's history were reviewed and updated as appropriate: allergies, current medications, past family history, past medical history, past social history, past surgical history and problem list.    Patient Active Problem List   Diagnosis   • Atrial tachycardia (CMS/HCC)   • Essential hypertension   • Mixed hyperlipidemia   • Palpitation   • Lung nodule   • Cardiomegaly   • Diabetes mellitus (CMS/HCC)       Allergies   Allergen Reactions   • Levaquin [Levofloxacin] Rash   • Rocephin [Ceftriaxone] Rash   • Vancomycin Rash       Family History   Problem Relation Age of Onset   • Cancer Mother    • Cancer Father    • Cancer Sister    • Cancer Brother    • Cancer Sister        Social History     Socioeconomic History   • Marital status:      Spouse name: Not on file   • Number of children: Not on file   • Years of education: Not on file   • Highest education level: Not on file   Tobacco Use   • Smoking status: Former Smoker   • Smokeless tobacco: Never Used   • Tobacco comment: quit 40 years ago   Substance and Sexual Activity   • Alcohol use: No   • Drug use: No         Current Outpatient Medications:   •  atorvastatin (LIPITOR) 10 MG tablet, Take 10 mg by mouth Daily., Disp: , Rfl:   •  levothyroxine (SYNTHROID) 100 MCG tablet, Take 1 tablet by mouth Daily., Disp: 30 tablet, Rfl: 2  •  metoprolol tartrate (LOPRESSOR) 25 MG tablet, Take 25 mg by mouth Daily., Disp: , Rfl:   •  vitamin B-12 (CYANOCOBALAMIN) 1000 MCG tablet, Take 1,000 mcg by mouth Daily., Disp: , Rfl:   •  azithromycin (Zithromax Z-Booker) 250 MG tablet, Take 2 tablets the first day, then 1  "tablet daily for 4 days., Disp: 6 tablet, Rfl: 0  •  Cholecalciferol (VITAMIN D3 PO), Take  by mouth., Disp: , Rfl:   •  GLIMEPIRIDE PO, Take  by mouth., Disp: , Rfl:   •  HYDROcodone-acetaminophen (NORCO) 5-325 MG per tablet, Take 1 tablet by mouth Every 8 (Eight) Hours As Needed for Moderate Pain ., Disp: 10 tablet, Rfl: 0    Past Surgical History:   Procedure Laterality Date   • BREAST CYST EXCISION     • PERICARDIAL WINDOW         Review of Systems   Constitutional: Negative for activity change, appetite change, fatigue, fever and unexpected weight change.   Respiratory: Negative for apnea, chest tightness and shortness of breath.    Cardiovascular: Negative for chest pain, palpitations and leg swelling.   Gastrointestinal: Negative for abdominal pain and blood in stool.   Genitourinary: Negative for dysuria and hematuria.   Musculoskeletal: Positive for arthralgias. Negative for myalgias.   Neurological: Negative for weakness and light-headedness.   Hematological: Bruises/bleeds easily.   Psychiatric/Behavioral: Negative for sleep disturbance.       /71   Pulse 70   Ht 149.9 cm (59\")   Wt 67.6 kg (149 lb)   BMI 30.09 kg/m²   Procedures    Objective   Physical Exam  Constitutional:       General: She is not in acute distress.     Appearance: She is not ill-appearing.      Comments: Elderly and mildly frail   Eyes:      Pupils: Pupils are equal, round, and reactive to light.   Cardiovascular:      Rate and Rhythm: Normal rate and regular rhythm.      Pulses: Normal pulses.      Heart sounds: No murmur. No friction rub. No gallop.    Pulmonary:      Effort: Pulmonary effort is normal. No respiratory distress.      Breath sounds: Normal breath sounds. No wheezing, rhonchi or rales.   Abdominal:      General: Bowel sounds are normal. There is no distension.      Palpations: Abdomen is soft. There is no mass.      Tenderness: There is no abdominal tenderness.   Musculoskeletal:         General: No " tenderness or deformity.      Right lower leg: No edema.      Left lower leg: No edema.   Skin:     General: Skin is warm and dry.      Findings: Bruising present.      Comments: Skin is thin with multiple ecchymoses   Neurological:      General: No focal deficit present.      Mental Status: She is alert.   Psychiatric:         Mood and Affect: Mood normal.         Assessment/Plan   Rashard was seen today for rapid heart rate, hypertension and hyperlipidemia.    Diagnoses and all orders for this visit:    Atrial tachycardia (CMS/HCC)  Comments:  asymptomatic - can increase Metoprolol to bid if needed  Orders:  -     ECG 12 Lead    Essential hypertension  Comments:  good control                 Return in about 1 year (around 9/16/2021) for Next scheduled follow up.  Orders Placed This Encounter   Procedures   • ECG 12 Lead     Order Specific Question:   Reason for Exam:     Answer:   [email or web address]

## 2020-11-14 PROBLEM — I48.91 ATRIAL FIBRILLATION WITH RVR (HCC): Status: ACTIVE | Noted: 2020-01-01

## 2020-11-14 PROBLEM — U07.1 COVID-19 VIRUS DETECTED: Status: ACTIVE | Noted: 2020-01-01

## 2020-11-14 PROBLEM — U07.1 COVID-19 VIRUS INFECTION: Status: ACTIVE | Noted: 2020-01-01

## 2020-11-23 PROBLEM — J96.01 ACUTE RESPIRATORY FAILURE WITH HYPOXIA (HCC): Status: ACTIVE | Noted: 2020-01-01

## 2020-11-23 PROBLEM — E03.9 HYPOTHYROID: Status: ACTIVE | Noted: 2020-01-01

## 2020-11-23 PROBLEM — D72.829 LEUKOCYTOSIS: Status: ACTIVE | Noted: 2020-01-01

## 2020-11-23 PROBLEM — E87.0 HYPERNATREMIA: Status: ACTIVE | Noted: 2020-01-01

## 2020-11-23 PROBLEM — R54 ADVANCED AGE: Status: ACTIVE | Noted: 2020-01-01

## 2020-11-23 PROBLEM — U07.1 PNEUMONIA DUE TO COVID-19 VIRUS: Status: ACTIVE | Noted: 2020-01-01

## 2020-11-23 PROBLEM — J12.82 PNEUMONIA DUE TO COVID-19 VIRUS: Status: ACTIVE | Noted: 2020-01-01

## 2020-11-23 PROBLEM — E87.6 HYPOKALEMIA: Status: ACTIVE | Noted: 2020-01-01

## 2020-12-01 PROBLEM — D64.9 ANEMIA: Status: ACTIVE | Noted: 2020-01-01

## 2020-12-01 PROBLEM — D69.6 THROMBOCYTOPENIA (HCC): Status: ACTIVE | Noted: 2020-01-01

## 2020-12-01 NOTE — PROGRESS NOTES
PULMONARY AND CRITICAL CARE PROGRESS NOTE - [facility]    Patient: Rashard Mobley    7/17/1928    MR# [id number]    Acct# [id number]  12/01/20   08:40 CST  Referring Provider: Vicente Quiroz MD    Chief Complaint: Mechanically ventilated    Interval history: Pt remains mechanically ventilated due to SARS-CoV-19 infection.  Patient was evaluated through through glass door as the patient is in COVID-19 isolation in effort to preserve PPE and avoid unnecessary exposure.  The patient remains intubated and sedated on Precedex and fentanyl drips.  Levophed infusing.  O2 sat 100% on 100%% FiO2, 12 PEEP.  Nursing reports the patient had worsening hypoxemia overnight requiring increase FiO2 to 100%.  Will decrease PEEP to 10 today.  No other aggravating or alleviating factors.     Meds:  atorvastatin, 10 mg, Oral, Nightly  budesonide-formoterol, 2 puff, Inhalation, BID - RT  calcium gluconate, 1 g, Intravenous, Once  cholecalciferol, 2,000 Units, Oral, Daily  dexamethasone, 4 mg, Intravenous, Daily  dilTIAZem, 30 mg, Oral, Q6H  HYDROmorphone, 2 mg, Intravenous, Once  insulin detemir, 10 Units, Subcutaneous, Q12H  insulin lispro, 0-9 Units, Subcutaneous, Q6H  insulin regular, 10 Units, Intravenous, Once  ipratropium, 2 puff, Inhalation, 4x Daily - RT  levothyroxine, 88 mcg, Oral, Q AM  melatonin, 3 mg, Oral, Nightly  pantoprazole, 40 mg, Intravenous, BID  sodium polystyrene, 15 g, Oral, Once  vitamin B-12, 1,000 mcg, Oral, Daily  vitamin C, 500 mg, Oral, BID  zinc sulfate, 220 mg, Oral, Daily      dexmedetomidine, 0.2-1.5 mcg/kg/hr, Last Rate: 1.4 mcg/kg/hr (12/01/20 0831)  fentanyl 10 mcg/mL,  mcg/hr, Last Rate: 275 mcg/hr (12/01/20 0832)  midazolam (VERSED) infusion 1 mg/mL (derrek/ped), 0.03 mg/kg/hr, Last Rate: Stopped (12/01/20 0753)  norepinephrine, 0.02-0.3 mcg/kg/min, Last Rate: 0.14 mcg/kg/min (12/01/20 4175)      Review of Systems:   Cannot obtain due to mechanical ventilation.  The  patient notably is critically ill and connected to a ventilator.  As such patient cannot communicate and provide any history whatsoever, including any history of present illness or interval history since arrival or review of systems. The interested reviewer may note this fact, as an attempt has been made at collecting and documenting these portions of the patient history, but this information is unobtainable despite attempted review and therefore cannot be documented at this time.   Ventilator Settings:        Resp Rate (Set): 22  Pressure Support (cm H2O): 0 cm H20  FiO2 (%): 50 %  PEEP/CPAP (cm H2O): 12 cm H20  Minute Ventilation (L/min) (Obs): 14.6 L/min  Resp Rate (Observed) Vent: 29  I:E Ratio (Set): 2.10:1  I:E Ratio (Obs): 1:2.0  PIP Observed (cm H2O): 21 cm H2O     Physical Exam:  Temp:  [96.5 °F (35.8 °C)-98.3 °F (36.8 °C)] 98.3 °F (36.8 °C)  Heart Rate:  [] 103  Resp:  [23-30] 26  BP: ()/(43-71) 113/62  FiO2 (%):  [50 %] 50 %    Intake/Output Summary (Last 24 hours) at 12/1/2020 0840  Last data filed at 12/1/2020 0400  Gross per 24 hour   Intake 2776.92 ml   Output 1200 ml   Net 1576.92 ml     SpO2 Percentage    12/01/20 0600 12/01/20 0629 12/01/20 0654   SpO2: 95% 95% (!) 71%      GENERAL/CONSTITUTIONAL: no distress.  Pt is on vent  HEENT: atraumatic, normocephalic  NOSE: normal  NECK: jugular veins nondistended  CHEST: no paradox, no retractions.  No respiratory distress.   Vent synchrony: None  CARDIAC: Irregular rhythm  ABDOMEN: nondistended  : Deferred  EXTREMITIES: Mild edema.  NEURO:  sedated, mechanically ventilated  SKIN: no jaundice.  No rash   Results from last 7 days   Lab Units 12/01/20  0427 11/30/20  1052 11/30/20  0529  11/29/20  1419   WBC 10*3/mm3 24.19*  --  24.65*  --  20.19*   HEMOGLOBIN g/dL 7.3* 7.5* 7.8*   < > 8.5*   PLATELETS 10*3/mm3 95*  --  99*  --  75*    < > = values in this interval not displayed.     Results from last 7 days   Lab Units 12/01/20  0426  11/30/20  0529 11/29/20  1419   SODIUM mmol/L 135* 135* 136   POTASSIUM mmol/L 6.0* 5.4* 4.6   BUN mg/dL 62* 57* 45*   CREATININE mg/dL 0.80 0.91 0.58     Results from last 7 days   Lab Units 11/30/20  0334 11/30/20  0005 11/29/20  1930   PH, ARTERIAL pH units 7.326* 7.224* 7.445   PCO2, ARTERIAL mm Hg 52.9* 67.0* 38.6   PO2 ART mm Hg 189.0* 77.3* 59.2*   FIO2 % 100 100 100     Blood Culture   Date Value Ref Range Status   11/29/2020 No growth at less than 24 hours  Preliminary   11/29/2020 No growth at less than 24 hours  Preliminary     Recent films:  Xr Chest 1 View    Result Date: 11/30/2020  Impression: 1. ET tube is mildly low, with tip projecting only 2.5 cm above the jesus. 2. Lung volumes and bilateral infiltrates are not significantly changed.   This report was finalized on 11/30/2020 09:13 by Dr Ede Olivo, .    Xr Chest 1 View    Result Date: 11/30/2020  Impression:  1.  CVL tip overlies the LEFT brachiocephalic vein. 2.  Endotracheal tube is 2.5 cm above the jesus. 3.  No change in bilateral interstitial and airspace opacity. This report was finalized on 11/30/2020 08:08 by Dr. Gage Miller MD.    Xr Chest 1 View    Result Date: 11/29/2020  Bilateral infiltrates with no significant change.   This report was finalized on 11/29/2020 12:32 by Dr. William Taylor MD.    Us Venous Doppler Upper Extremity Left (duplex)    Result Date: 11/30/2020  Impression: There is no evidence of deep venous thrombosis of the left upper extremity.  Comments: Left upper extremity venous duplex exam was performed using color Doppler flow, Doppler wave form analysis, and grayscale imaging, with and without compression. There is no evidence of deep venous thrombosis of the subclavian vein. This report was finalized on 11/30/2020 14:53 by Dr. Matheus Gusman MD.    Films reviewed personally by me.  My interpretation: Endotracheal tube intact.  Bilateral lung infiltrates are unchanged.  Pulmonary Assessment:  1. Acute  respiratory failure with hypoxia due to Covid-19  2. Atrial fibrillation, new onset  3. Leukocytosis  4. Hypothyroidism  5. Advanced age  6. Thrombocytopenia    Recommend:   · Day #3 ETT of 2nd intubation.  Continue mechanical ventilation.  Decrease PEEP to 10.  Titrate FiO2 for sat 90-94%  · Continue bronchodilators with Atrovent and Symbicort. Avoid albuterol due to risk of tachycardia.  · Candidate for prone positioning: no  · Remdesivir, dexamethasone completed  · Dexamethasone resumed 11/29/20 for worsening respiratory status requiting re-intubation, will titrate as tolerated.  Decreased to 4 mg IV daily today.    · Off abx at this time   · DVT prophylaxis-Lovenox on hold   · Stress ulcer prophylaxis - protonix   · Continue zinc, vitamin C, melatonin, vitamin D  · Nutrition per Dobbhoff tube  · Recommend palliative care consult if family is agreeable   · Patients prognosis appears to be poor    Electronically signed by RUSSELL Lacey on 12/1/2020 at 08:40 CST     Physician substantive portion:  Patient remains on the ventilator sedated mechanically ventilated.  Peak airway pressure is 29.  She is on 12 cm of PEEP she is on maximal sedation and 100% FiO2.  She remains in atrial fibrillation with heart rate a little over 100.  She remains on fentanyl and Precedex.  Her tube feeds were changed to a lower potassium containing formula but she is still hyperkalemic.  Dr. Quiroz is working on that.  She remains on Levophed and Versed drips.  On exam she is ill-appearing elderly.  She does not have respiratory paradox.  No were really to go with the ventilator right now.  Continue dexamethasone with a gradual taper.    I have seen and examined patient personally, performing a face-to-face diagnostic evaluation with plan of care reviewed and developed with APRN and nursing staff. I have addended and/or modified the above history of present illness, physical examination, and assessment and plan to reflect my  findings and impressions. Essential elements of the care plan were discussed with APRN above.  Agree with findings and assessment/plan as documented above.    Electronically signed by Newton Woodard MD, on 12/1/2020, 09:36 CST

## 2020-12-01 NOTE — CONSULTS
[facility] Oncology/Hematology Services  CONSULT NOTE    PATIENT NAME:  Rashard Mobley  YOB: 1928  PATIENT MRN:  [id number]    Date of Admission:  11/14/2020  Consultation Date:  12/1/2020  Referring Provider: Provider, No Known    Subjective     Reason for Consultation: Thrombocytopenia from sepsis syndrome due to COVID-19.    History of present illness: Rashard Mobleyis a 89 y.o.  female who is seen on consultation for thrombocytopenia from sepsis syndrome/disseminated intravascular coagulation. She is seen through the glass door to avoid exposure and conserve PPE. History from the chart and nurse Pascual.    She presented with shortness of breathing, weakness and dizziness. She developed atrial fibrillation with rapid ventricular response.    CBC 11/14/2020 remarkable for MCV 97.8 and platelet 125.  No schistocytes.  CMP remarkable for glucose of 219.  Covid 19 positive. PT 13.1 and PTT 34. D-dimer normal at 0.41. Lactate normal at 1.4.  Chest x-ray showed small faint infiltrate in the right lung base.. CT angiogram showed no evidence of pulmonary embolus.  Extensive groundglass consolidation within both lungs with more confluent consolidation in the lower lobes.  Moderate cardiomegaly.    CBC 11/15/2020 remarkable for MCV 90.3 and platelet 128.  No schistocytes.    CBC 11/16/2020 revealed WBC 11.2, and platelet 114.  No schistocytes.    She had required increasing oxygen requirement and moved to MICU 11/17/2020.     CBC 11/17/2020 remarkable for WBC 16, platelet 112 and ANC 15.  No schistocytes.    CBC 11/18/2020 remarkable for WBC 21.9, platelet 117 and ANC 20.4.    CBC 11/19/2020 remarkable for WBC 20.4 and ANC 18.2.  D-dimer 0.82 and fibrinogen 677.    Palliative care has been consulted 11/19/2020. No CPR per family.    CBC 11/20/2020 remarkable for WBC 16.1 and ANC 15.09.    CBC 11/21/2020 remarkable for WBC 16.2 and ANC 14.1.    CBC 11/22/2020 remarkable for WBC  21.3.    CBC 11/23/2020 revealed WBC 23.02.  LDH was 639.  D-dimer 0.29 fibrinogen 481.    CBC 11/25/2020 remarkable for WBC 23.1.    Blood culture 11/29/2020 showed no growth for 2 days.    CBC 11/27/2020 revealed WBC 24.9, hemoglobin 11.1, and platelet 121.    CBC 11/29/2020 remarkable for WBC 20.1, hemoglobin 8.5, MCV 90.1 and platelets 75.  Fecal occult blood + 11/29/2020.  Venous Doppler 11/29/2020 showed no evidence of deep venous thrombosis left upper extremity.    CBC 11/30/2020 remarkable for WBC 24.6, hemoglobin 7.8, hematocrit 24.1, MCV 99.2, platelet 99, and ANC 22.4.    Blood film review 11/30/2020 showed severe macrocytic anemia, 1 to 2% circulating schistocytes.  Severe thrombocytopenia.  Leukocytosis with absolute neutrophilia and monocytosis.  Stable lymphopenia.    Chest x-ray 11/30/2020 showed ET tube is mildly low.  Lung volumes and bilateral infiltrates are not significantly changed.    CBC 12/01/2020 revealed WBC 24.1, hemoglobin 7.3, hematocrit 22.2, MCV 98.2, platelet 95, ANC 22.01, and no schistocytes.  CMP remarkable for potassium of 6, ALT 37, alkaline phosphatase 121 and glucose 220.  Procalcitonin 1.1.  Ferritin 1527, CK 18, , and CRP 7.01.  D-dimer 6.6, fibrinogen 420, PT 14.9, INR 1.2, and PTT 29.4.    She has been intubated for the last 2 days and also on Levophed.    With above background, she is seen.    Past Medical History:  Past Medical History:   Diagnosis Date   • Diabetes mellitus (CMS/HCC)    • Dysrhythmia, cardiac    • History of pericarditis    • Hypertension    • Hypothyroid    • Palpitations     : PAR AFIB   • Paroxysmal atrial tachycardia (CMS/HCC)    • Pericardial disease      Prior Surgeries:  Past Surgical History:   Procedure Laterality Date   • BREAST CYST EXCISION     • PERICARDIAL WINDOW          Allergies:Levaquin [levofloxacin], Rocephin [ceftriaxone], and Vancomycin  PTA Meds:  Medications Prior to Admission   Medication Sig Dispense Refill Last Dose   •  levothyroxine (SYNTHROID, LEVOTHROID) 88 MCG tablet Take 88 mcg by mouth Daily.   11/14/2020   • atorvastatin (LIPITOR) 10 MG tablet Take 10 mg by mouth Daily.   11/13/2020   • cholecalciferol (VITAMIN D3) 25 MCG (1000 UT) tablet Take 1 tablet by mouth Daily.   11/14/2020   • metoprolol tartrate (LOPRESSOR) 25 MG tablet Take 25 mg by mouth Daily.   11/14/2020   • vitamin B-12 (CYANOCOBALAMIN) 1000 MCG tablet Take 1,000 mcg by mouth Daily.   11/14/2020      Current Meds:   Current Facility-Administered Medications   Medication Dose Route Frequency Provider Last Rate Last Admin   • acetaminophen (TYLENOL) tablet 650 mg  650 mg Oral Q4H PRN Pascual Diego MD   650 mg at 11/16/20 1639    Or   • acetaminophen (TYLENOL) suppository 650 mg  650 mg Rectal Q4H PRN Pascual Diego MD       • albuterol sulfate HFA (PROVENTIL HFA;VENTOLIN HFA;PROAIR HFA) inhaler 2 puff  2 puff Inhalation Q6H PRN Pascual Diego MD       • atorvastatin (LIPITOR) tablet 10 mg  10 mg Oral Nightly Pascual Diego MD   10 mg at 11/30/20 2101   • benzonatate (TESSALON) capsule 100 mg  100 mg Oral TID PRN Pascual Diego MD       • budesonide-formoterol (SYMBICORT) 160-4.5 MCG/ACT inhaler 2 puff  2 puff Inhalation BID - RT Vicente Quiroz MD   2 puff at 12/01/20 0629   • cholecalciferol (VITAMIN D3) tablet 2,000 Units  2,000 Units Oral Daily Pascual Diego MD   2,000 Units at 12/01/20 0806   • dexamethasone (DECADRON) injection 4 mg  4 mg Intravenous Daily Newton Woodard MD   4 mg at 12/01/20 0805   • dexmedetomidine (PRECEDEX) 400 mcg in 100 mL NS infusion  0.2-1.5 mcg/kg/hr Intravenous Titrated Pascual Diego MD 19.9 mL/hr at 12/01/20 0859 1.3 mcg/kg/hr at 12/01/20 0859   • dextromethorphan polistirex ER (DELSYM) 30 MG/5ML oral suspension 60 mg  10 mL Oral Q12H PRN Pascual Diego MD       • dextrose (D50W) 25 g/ 50mL Intravenous Solution 25 g  25 g Intravenous Q15 Min PRN Johnathon,  Pascual Almonte MD       • dextrose (GLUTOSE) oral gel 15 g  15 g Oral Q15 Min PRN Pascual Diego MD       • dilTIAZem (CARDIZEM) tablet 30 mg  30 mg Oral Q6H Vicente Quiroz MD   30 mg at 12/01/20 1132   • fentaNYL citrate (PF) (SUBLIMAZE) 2,500 mcg in sodium chloride 0.9 % 250 mL (10 mcg/mL) infusion   mcg/hr Intravenous Titrated Mele Cabrera MD 22.5 mL/hr at 12/01/20 1134 225 mcg/hr at 12/01/20 1134   • glucagon (human recombinant) (GLUCAGEN DIAGNOSTIC) injection 1 mg  1 mg Subcutaneous Q15 Min PRN Pascual Diego MD       • HYDROcodone-acetaminophen (NORCO) 5-325 MG per tablet 1 tablet  1 tablet Oral Q8H PRN Pascual Diego MD   1 tablet at 11/29/20 1647   • HYDROmorphone (DILAUDID) injection 2 mg  2 mg Intravenous Once Mele Cabrera MD       • insulin detemir (LEVEMIR) injection 10 Units  10 Units Subcutaneous Q12H Vicente Quiroz MD   10 Units at 12/01/20 0807   • insulin lispro (humaLOG) injection 0-9 Units  0-9 Units Subcutaneous Q6H Vicente Quiroz MD   4 Units at 12/01/20 0636   • ipratropium (ATROVENT HFA) inhaler 2 puff  2 puff Inhalation 4x Daily - RT LoganYamilet APRN   2 puff at 12/01/20 1114   • levothyroxine (SYNTHROID, LEVOTHROID) tablet 88 mcg  88 mcg Oral Q AM Pascual Diego MD   88 mcg at 12/01/20 0635   • melatonin tablet 3 mg  3 mg Oral Nightly Pascaul Diego MD   3 mg at 11/30/20 2101   • metoprolol tartrate (LOPRESSOR) injection 2.5 mg  2.5 mg Intravenous Q6H PRN Adonis Melo MD   2.5 mg at 11/29/20 1648   • midazolam (VERSED) 1 mg/mL in dextrose (D5W) 5 % 50 mL infusion  0.03 mg/kg/hr Intravenous Continuous Mele Cabrera MD   Stopped at 12/01/20 0753   • norepinephrine (LEVOPHED) 8,000 mcg in sodium chloride 0.9 % 250 mL infusion  0.02-0.3 mcg/kg/min Intravenous Titrated Mele Cabrera MD 17.12 mL/hr at 12/01/20 0804 0.14 mcg/kg/min at 12/01/20 0804   • pantoprazole  (PROTONIX) injection 40 mg  40 mg Intravenous BID Adonis Melo MD   40 mg at 12/01/20 0806   • potassium chloride 20 mEq in 50 mL IVPB  20 mEq Intravenous Q1H PRN Nikolas Boogie MD 50 mL/hr at 11/21/20 0553 20 mEq at 11/21/20 0553   • vitamin B-12 (CYANOCOBALAMIN) tablet 1,000 mcg  1,000 mcg Oral Daily Pascual Diego MD   1,000 mcg at 12/01/20 0806   • vitamin C (ASCORBIC ACID) tablet 500 mg  500 mg Oral BID Pascual Diego MD   500 mg at 12/01/20 0806   • zinc sulfate (ZINCATE) capsule 220 mg  220 mg Oral Daily Pascual Diego MD   220 mg at 12/01/20 0806       Family History: Unobtainable.   Social History: Remote history of smoking. No alcohol use. She lives with her spouse.    Review of Systems  Review of Systems   Unable to perform ROS: Intubated         Objective      Vital Signs   Temp:  [96.5 °F (35.8 °C)-101.1 °F (38.4 °C)] 101.1 °F (38.4 °C)  Heart Rate:  [] 92  Resp:  [11-30] 11  BP: ()/(44-71) 115/56  FiO2 (%):  [50 %-100 %] 100 %    Physical Exam  Vitals signs and nursing note reviewed.   Constitutional:       Comments: Patient is seen through the glass door to avoid unnecessary exposure. She is ill looking on mechanical ventilation. She is sedated. Cardiac monitor revealed atrial fibrillation.         Results from last 7 days   Lab Units 12/01/20 0427 11/30/20  1052 11/30/20  0529 11/29/20  1419   WBC 10*3/mm3 24.19*  --  24.65*  --  20.19*   HEMOGLOBIN g/dL 7.3* 7.5* 7.8*   < > 8.5*   HEMATOCRIT % 22.2* 22.9* 24.1*   < > 25.9*   PLATELETS 10*3/mm3 95*  --  99*  --  75*    < > = values in this interval not displayed.        Results from last 7 days   Lab Units 12/01/20 0427 11/30/20  0529 11/29/20  1419   SODIUM mmol/L 135* 135* 136   POTASSIUM mmol/L 6.0* 5.4* 4.6   CHLORIDE mmol/L 101 100 101   CO2 mmol/L 28.0 28.0 28.0   BUN mg/dL 62* 57* 45*   CREATININE mg/dL 0.80 0.91 0.58   CALCIUM mg/dL 9.6 9.5 9.7*   BILIRUBIN mg/dL 0.5 0.5 0.5   ALK PHOS U/L 121*  141* 150*   ALT (SGPT) U/L 37* 40* 27   AST (SGOT) U/L 24 30 23   GLUCOSE mg/dL 220* 311* 274*       ABG:    pH, Arterial   Date Value Ref Range Status   11/30/2020 7.326 (L) 7.350 - 7.450 pH units Final     Comment:     84 Value below reference range     pO2, Arterial   Date Value Ref Range Status   11/30/2020 189.0 (H) 83.0 - 108.0 mm Hg Final     Comment:     83 Value above reference range     pCO2, Arterial   Date Value Ref Range Status   11/30/2020 52.9 (H) 35.0 - 45.0 mm Hg Final     Comment:     83 Value above reference range       Culture Data:   Blood Culture   Date Value Ref Range Status   11/29/2020 No growth at 2 days  Preliminary   11/29/2020 No growth at 2 days  Preliminary       Radiology:   Imaging Results (Last 7 Days)     Procedure Component Value Units Date/Time    US Venous Doppler Upper Extremity Left (duplex) [[id number]] Collected: 11/30/20 1452     Updated: 11/30/20 1456    Narrative:      History: Swelling       Impression:      Impression: There is no evidence of deep venous thrombosis of the left  upper extremity.     Comments: Left upper extremity venous duplex exam was performed using  color Doppler flow, Doppler wave form analysis, and grayscale imaging,  with and without compression. There is no evidence of deep venous  thrombosis of the subclavian vein.  This report was finalized on 11/30/2020 14:53 by Dr. Matheus Gusman MD.    XR Chest 1 View [[id number]] Collected: 11/30/20 0911     Updated: 11/30/20 0916    Narrative:      Frontal supine radiograph of the chest 11/30/2020 8:45 AM CST     History: Had to advance the tube to 21cm; U07.1-COVID-19;  J18.9-Pneumonia, unspecified organism; I48.91-Unspecified atrial  fibrillation; R09.02-Hypoxemia; Z74.09-Other reduced mobility;  U07.1-COVID-19; J96.01-Acute respiratory failure with hypoxia;  U07.1-COVID-19; J12.89-Other viral pneumonia     Comparison: Chest exam dated 11/29/2020      Findings:      Endotracheal tube is somewhat low,  with tip projecting 2.5 cm above the  jesus. Bilateral perihilar and basilar patchy lung infiltrates are  stable. Lung volumes are stable. No dense consolidation is seen. No  pleural effusion or pneumothorax. Enteric tube is in good position.  Remnant left chest wall catheter. No acute bony abnormality.       Impression:      Impression:   1. ET tube is mildly low, with tip projecting only 2.5 cm above the  jesus.  2. Lung volumes and bilateral infiltrates are not significantly changed.        This report was finalized on 11/30/2020 09:13 by Dr Ede Olivo, .    XR Chest 1 View [[id number]] Collected: 11/30/20 0806     Updated: 11/30/20 0811    Narrative:      Exam: XR CHEST 1 VW-     Indication: Line placement, Covid pneumonia     Comparison: 11/29/2020 at 12:04 PM     Findings:     Left-sided CVL with tip overlying the LEFT brachiocephalic vein.  Endotracheal tube is 2.5 cm above the jesus. Enteric tube terminates  below the diaphragm out of the field of view. Cardiac silhouette is  stable. No significant change in bilateral interstitial and airspace  opacity. No pneumothorax. No acute osseous finding.       Impression:      Impression:     1.  CVL tip overlies the LEFT brachiocephalic vein.  2.  Endotracheal tube is 2.5 cm above the jesus.  3.  No change in bilateral interstitial and airspace opacity.  This report was finalized on 11/30/2020 08:08 by Dr. Gage Miller MD.    XR Chest 1 View [[id number]] Collected: 11/29/20 1231     Updated: 11/29/20 1235    Narrative:      EXAMINATION:  XR CHEST 1 VW-  11/29/2020 12:15 PM CST     HISTORY: Respiratory failure. U07.1-COVID-19; J18.9-Pneumonia,  unspecified organism; I48.91-Unspecified atrial fibrillation;  R09.02-Hypoxemia.     COMPARISON: 11/25/2020.     FINDINGS:  Bilateral infiltrates are not significantly changed. No  significant pleural effusion is seen. Heart size is upper limits of  normal. The thoracic aorta is atheromatous. An NG tube remains in  place.       Impression:      Bilateral infiltrates with no significant change.        This report was finalized on 11/29/2020 12:32 by Dr. William Taylor MD.    XR Abdomen KUB [[id number]] Collected: 11/25/20 1042     Updated: 11/25/20 1045    Narrative:      EXAMINATION: KUB radiograph 11/25/2020     HISTORY: NG tube     FINDINGS: KUB radiograph reveals an NG tube has been advanced with the  tip at the gastric outlet. The tube is well-positioned and ready for  use.  This report was finalized on 11/25/2020 10:42 by Dr. Desmond Wolfe MD.    XR Chest 1 View [[id number]] Collected: 11/25/20 1041     Updated: 11/25/20 1045    Narrative:      EXAMINATION: Chest 1 view 11/25/2020     HISTORY: Covid     FINDINGS: Today's exam is compared to previous study of 2 days earlier.  An NG tube remains in place. Persistent bilateral interstitial  infiltrates are present stable from the previous study. There is no  effusion or free air present.       Impression:      1.. Stable diffuse bilateral interstitial infiltrates.  2. No interval line changes.  This report was finalized on 11/25/2020 10:41 by Dr. Desmond Wolfe MD.          Pathology Results:   Lab Results   Component Value Date    PATHINTERP  11/30/2020     Peripheral blood smear, pathologist review:  Severe macrocytic anemia.  1-2% circulating schistocytes.  Severe thrombocytopenia.  Leukocytosis with absolute neutrophilia and monocytosis.  Absolute lymphopenia.            Assessment/Plan        ASSESSMENT:   1.  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  Not compatible with thrombotic thrombocytic purpura/ hemolytic uremic syndrome/heparin induced thrombocytopenia.  2.  Anemia likely from the infection.  3.  Leukocytosis, reactive to the infection.  4.  Respiratory failure from COVID-19 infection.  5.  New onset atrial fibrillation.  6.  Advanced age.    PLAN:  1.  Discussed with nurse  "Pascual at bedside. Case discussed with Dr. Quiroz. No heparin therapy unless platelet is 100,000 or above. Agreed for direct oral anticoagulation and hold if platelet below 50,000. Case discussed with Dr. Mobley. Dr. Mobley has stated it after her father to decide for comfort/supportive measures. Aware of poor prognosis. \" Thank you for seeing her.\"  2.  Suggest to transfuse 2 unit platelet if platelet count below 20,000 or any level if actively bleeding.  3.  Suggest to transfuse 2 units packed RBCs if hemoglobin below 7.  4.  Poor overall prognosis.  5.  Palliative care follow-up.  6.  No further suggestions from hematology. I will resume follow-up if needed. Dr. Quiroz in agreement.      Thank you for the consult.    Prince Heck MD  12/01/20  11:38 CST    I spent 51 total minutes, face-to-face, caring for Rashard today.  Greater than 50% of this time involved counseling and/or coordination of care as documented within this note regarding the patient's illness(es), pros and cons of various treatment options, instructions and/or risk reduction.      Thank you for allowing me to participate in the care of Ms. Rashard Mobley    "

## 2020-12-01 NOTE — PROGRESS NOTES
"Infectious Diseases Progress Note    Patient:  Rashard Mobley  YOB: 1928  MRN: [id number]   Admit date: 11/14/2020   Admitting Physician: Vicente Quiroz MD  Primary Care Physician: Jimy Montano MD    Chief Complaint/Interval History: She required intubation overnight.  She is currently on 50% FiO2.  She is on 12 of PEEP.  She is on Precedex and fentanyl for sedation.  As a result she has had some hypotension.  On low-dose Levophed.  She is without fever.  No excess pulmonary secretions.      Intake/Output Summary (Last 24 hours) at 11/30/2020 1901  Last data filed at 11/30/2020 1600  Gross per 24 hour   Intake 2832.8 ml   Output 900 ml   Net 1932.8 ml     Allergies:   Allergies   Allergen Reactions   • Levaquin [Levofloxacin] Rash   • Rocephin [Ceftriaxone] Rash   • Vancomycin Rash     Current Scheduled Medications:   atorvastatin, 10 mg, Oral, Nightly  budesonide-formoterol, 2 puff, Inhalation, BID - RT  cholecalciferol, 2,000 Units, Oral, Daily  dexamethasone, 6 mg, Intravenous, Daily  dilTIAZem, 30 mg, Oral, Q6H  HYDROmorphone, 2 mg, Intravenous, Once  insulin detemir, 10 Units, Subcutaneous, Q12H  insulin lispro, 0-9 Units, Subcutaneous, Q6H  ipratropium, 2 puff, Inhalation, 4x Daily - RT  levothyroxine, 88 mcg, Oral, Q AM  melatonin, 3 mg, Oral, Nightly  pantoprazole, 40 mg, Intravenous, BID  vitamin B-12, 1,000 mcg, Oral, Daily  vitamin C, 500 mg, Oral, BID  zinc sulfate, 220 mg, Oral, Daily      Current PRN Medications:  •  acetaminophen **OR** acetaminophen  •  albuterol sulfate HFA  •  benzonatate  •  dextromethorphan polistirex ER  •  dextrose  •  dextrose  •  glucagon (human recombinant)  •  HYDROcodone-acetaminophen  •  metoprolol tartrate  •  potassium chloride    Review of Systems  Unobtainable from patient    Vital Signs:  /57   Pulse 99   Temp 96.8 °F (36 °C) (Axillary)   Resp (!) 29   Ht 152.4 cm (60\")   Wt 66.4 kg (146 lb 6.2 oz)   SpO2 95%   BMI 28.59 " kg/m²     Physical Exam  Vital signs - reviewed.      Lab Results:  CBC:   Results from last 7 days   Lab Units 11/30/20  1052 11/30/20  0529 11/29/20  2352 11/29/20  1419 11/29/20  0549 11/27/20  0303 11/25/20  0857   WBC 10*3/mm3  --  24.65*  --  20.19* 17.23* 24.96* 23.12*   HEMOGLOBIN g/dL 7.5* 7.8* 8.6* 8.5* 7.9* 11.4* 12.2   HEMATOCRIT % 22.9* 24.1* 26.3* 25.9* 23.7* 34.1 36.6   PLATELETS 10*3/mm3  --  99*  --  75* 71* 121* 154     BMP:  Results from last 7 days   Lab Units 11/30/20  0529 11/29/20  1419 11/28/20  0000 11/24/20  0500   SODIUM mmol/L 135* 136 138 139   POTASSIUM mmol/L 5.4* 4.6 5.1 4.5   CHLORIDE mmol/L 100 101 103 107   CO2 mmol/L 28.0 28.0 25.0 25.0   BUN mg/dL 57* 45* 54* 49*   CREATININE mg/dL 0.91 0.58 0.75 0.58   GLUCOSE mg/dL 311* 274* 213* 343*   CALCIUM mg/dL 9.5 9.7* 9.7* 9.1   ALT (SGPT) U/L 40* 27 30  --      Culture Results:   Blood Culture   Date Value Ref Range Status   11/29/2020 No growth at 24 hours  Preliminary   11/29/2020 No growth at 24 hours  Preliminary     Radiology:   Chest x-ray today  IMPRESSION:  Impression:   1. ET tube is mildly low, with tip projecting only 2.5 cm above the  jesus.  2. Lung volumes and bilateral infiltrates are not significantly changed.  This report was finalized on 11/30/2020 09:13 by Dr Ede Olivo, .    Additional Studies Reviewed: None    Impression:   1.  Respiratory failure secondary to COVID-19 c could not be maintained on BiPAP.  Required intubation.  2.  Developing thrombocytopenia  3.  Leukocytosis-suspect dexamethasone or leukemoid reaction post viral infection  -  Recommendations:   Continue supportive care  Most recent blood cultures remain negative  Does not seem to have excess pulmonary secretions  Continue mechanical ventilation  Hopefully healing that would allow eventual extubation  Continue supportive care    Pelon Campbell MD

## 2020-12-01 NOTE — PROGRESS NOTES
[facility] Medicine Services  INPATIENT PROGRESS NOTE    Patient Name: Rashard Mobley  Date of Admission: 11/14/2020  Today's Date: 12/01/20  Length of Stay: 17  Primary Care Physician: Jimy Montano MD    Subjective   Chief Complaint: follow-up COVID  Dizziness    Patient had an eventful night.  When her sedation was held she became more restless and agitated.  This did result in worsening hypoxemia with O2 sats reportedly trending down into the 70s.  Sedation was subsequently restarted.  When she was bathed, and reposition, this also resulted in worsening hypoxemia.  Her FiO2 is now at 100% and her PEEP is at 12.  She has been tolerating her tube feeds without significant residuals.  She did have a bowel movement over the course of the past 24 hours.  She remains on Levophed for blood pressure support and goal mean arterial pressure of 65.  Urine output has been adequate at this juncture.  Review of Systems   Neurological: Positive for dizziness.        All pertinent negatives and positives are as above. All other systems have been reviewed and are negative unless otherwise stated.     Objective    Temp:  [96.5 °F (35.8 °C)-98.3 °F (36.8 °C)] 98.3 °F (36.8 °C)  Heart Rate:  [] 103  Resp:  [23-30] 26  BP: ()/(43-71) 113/62  FiO2 (%):  [50 %] 50 %  Physical Exam  Vitals signs and nursing note reviewed.   Constitutional:       Appearance: She is ill-appearing.      Comments: Intubated and on mechanical ventilation   HENT:      Head: Normocephalic.      Nose:      Comments: NG in place; TFs being administered at 60mL/hr     Mouth/Throat:      Pharynx: No oropharyngeal exudate (ET tube in place).   Cardiovascular:      Rate and Rhythm: Normal rate.      Comments: Rate 100-110 on the monitor  Pulmonary:      Comments: On mechanical ventilation; Fi02 at 100% and PEEP at 12  Genitourinary:     Comments: Yee  Musculoskeletal:         General: Swelling (bruising and  swelling left hand) present.   Skin:     General: Skin is warm.   Neurological:      Comments: Currently on sedation with Precedex and Fentanyl         Results Review:  I have reviewed the labs, radiology results, and diagnostic studies.    Laboratory Data:   Results from last 7 days   Lab Units 12/01/20  0427 11/30/20  1052 11/30/20  0529  11/29/20  1419   WBC 10*3/mm3 24.19*  --  24.65*  --  20.19*   HEMOGLOBIN g/dL 7.3* 7.5* 7.8*   < > 8.5*   HEMATOCRIT % 22.2* 22.9* 24.1*   < > 25.9*   PLATELETS 10*3/mm3 95*  --  99*  --  75*    < > = values in this interval not displayed.        Results from last 7 days   Lab Units 12/01/20  0427 11/30/20  0529 11/29/20  1419   SODIUM mmol/L 135* 135* 136   POTASSIUM mmol/L 6.0* 5.4* 4.6   CHLORIDE mmol/L 101 100 101   CO2 mmol/L 28.0 28.0 28.0   BUN mg/dL 62* 57* 45*   CREATININE mg/dL 0.80 0.91 0.58   CALCIUM mg/dL 9.6 9.5 9.7*   BILIRUBIN mg/dL 0.5 0.5 0.5   ALK PHOS U/L 121* 141* 150*   ALT (SGPT) U/L 37* 40* 27   AST (SGOT) U/L 24 30 23   GLUCOSE mg/dL 220* 311* 274*       Culture Data:   Blood Culture   Date Value Ref Range Status   11/19/2020 No growth at 3 days  Preliminary       Radiology Data:   Imaging Results (Last 24 Hours)     Procedure Component Value Units Date/Time    US Venous Doppler Upper Extremity Left (duplex) [[id number]] Collected: 11/30/20 1452     Updated: 11/30/20 1456    Narrative:      History: Swelling       Impression:      Impression: There is no evidence of deep venous thrombosis of the left  upper extremity.     Comments: Left upper extremity venous duplex exam was performed using  color Doppler flow, Doppler wave form analysis, and grayscale imaging,  with and without compression. There is no evidence of deep venous  thrombosis of the subclavian vein.  This report was finalized on 11/30/2020 14:53 by Dr. Matheus Gusman MD.    XR Chest 1 View [[id number]] Collected: 11/30/20 0911     Updated: 11/30/20 0916    Narrative:      Frontal supine  radiograph of the chest 11/30/2020 8:45 AM CST     History: Had to advance the tube to 21cm; U07.1-COVID-19;  J18.9-Pneumonia, unspecified organism; I48.91-Unspecified atrial  fibrillation; R09.02-Hypoxemia; Z74.09-Other reduced mobility;  U07.1-COVID-19; J96.01-Acute respiratory failure with hypoxia;  U07.1-COVID-19; J12.89-Other viral pneumonia     Comparison: Chest exam dated 11/29/2020      Findings:      Endotracheal tube is somewhat low, with tip projecting 2.5 cm above the  jesus. Bilateral perihilar and basilar patchy lung infiltrates are  stable. Lung volumes are stable. No dense consolidation is seen. No  pleural effusion or pneumothorax. Enteric tube is in good position.  Remnant left chest wall catheter. No acute bony abnormality.       Impression:      Impression:   1. ET tube is mildly low, with tip projecting only 2.5 cm above the  jesus.  2. Lung volumes and bilateral infiltrates are not significantly changed.        This report was finalized on 11/30/2020 09:13 by Dr Ede Olivo, .    XR Chest 1 View [[id number]] Collected: 11/30/20 0806     Updated: 11/30/20 0811    Narrative:      Exam: XR CHEST 1 VW-     Indication: Line placement, Covid pneumonia     Comparison: 11/29/2020 at 12:04 PM     Findings:     Left-sided CVL with tip overlying the LEFT brachiocephalic vein.  Endotracheal tube is 2.5 cm above the jesus. Enteric tube terminates  below the diaphragm out of the field of view. Cardiac silhouette is  stable. No significant change in bilateral interstitial and airspace  opacity. No pneumothorax. No acute osseous finding.       Impression:      Impression:     1.  CVL tip overlies the LEFT brachiocephalic vein.  2.  Endotracheal tube is 2.5 cm above the jesus.  3.  No change in bilateral interstitial and airspace opacity.  This report was finalized on 11/30/2020 08:08 by Dr. Gage Miller MD.          I have reviewed the patient's current medications.     Assessment/Plan     Active  Hospital Problems    Diagnosis   • Thrombocytopenia (CMS/HCC)   • Anemia   • Acute respiratory failure with hypoxia (CMS/HCC)   • Pneumonia due to COVID-19 virus   • Hypernatremia   • Hypokalemia   • Hypothyroid   • Leukocytosis   • Advanced age   • Atrial fibrillation with RVR (CMS/HCC)   • COVID-19 virus detected   • COVID-19 virus infection   • Diabetes (CMS/HCC)   • Essential hypertension     Plan:  1.  Day #2 on mechanical ventilation  2.  Currently on 100% Fi02 and 12 PEEP  3.  Levophed IV gtt - wean as tolerated for goal MAP 65  4.  Sedation with Fentanyl and Precedex; hold adding Versed for now  5.  Completed 5 day course of Remdesivir  6.  Completed course of Azactam  7.  Pulmonary and ID following; appreciate their assistance  8.  D/C Yee.  Utilize Purewick and I/O catheterization if needed.    9.  Completed 10 days of Decadron at 6mg; tapered to 4mg now  10.  Blood cultures X 2 for fever this AM  11.  Left subclavian CVL placed on 11/29  12.  H/H and platelet trend noted  13.  Lovenox currently on hold  14.  SCDs  15.  Peripheral smear as follows:    16.  Will check fibrinogen, PT/INR, PTT, D-dimer now.  Check procalcitonin now  17.  Ask for Hematology evaluation  18.  Statin, Zinc, vitamin C, vitamin D, Melatonin and Pepcid  19.  TFs for nutrition (changed to Diabetasource)  20.  Kayexalate X 1, regular insulin IV X 1, and calcium gluconate for treatment of hyperkalemia; repeat BMP this PM  21.  Insulin Levemir at 10 units BID. SSI coverage  22.  Duplex study LUE negative  23.  Patient remains critically ill; prognosis very guarded.      Electronically signed by Vicente Quiroz MD, 12/01/20, 08:01 CST.

## 2020-12-02 PROBLEM — E03.9 HYPOTHYROID: Status: RESOLVED | Noted: 2020-01-01 | Resolved: 2020-01-01

## 2020-12-02 PROBLEM — E87.6 HYPOKALEMIA: Status: RESOLVED | Noted: 2020-01-01 | Resolved: 2020-01-01

## 2020-12-02 PROBLEM — E87.5 HYPERKALEMIA: Status: ACTIVE | Noted: 2020-01-01

## 2020-12-02 NOTE — PROGRESS NOTES
Palliative Medicine Note    CC:unable to obtain secondary to acuity of condition and intubation    History:  Rashard Mobley is a 89 y.o. female   Worse today with increasing desaturations requiring increase in PEEP and FiO2. Increased ectopy and irregularity of heart rhythm. On max sedation with multiple meds with worsening of dyssynchrony and desaturations with weaning.    ROS:  Review of Systems   Unable to perform ROS: Intubated         Current Facility-Administered Medications:   •  acetaminophen (TYLENOL) tablet 650 mg, 650 mg, Oral, Q4H PRN, 650 mg at 11/16/20 1639 **OR** acetaminophen (TYLENOL) suppository 650 mg, 650 mg, Rectal, Q4H PRN, Pascual Diego MD  •  albuterol sulfate HFA (PROVENTIL HFA;VENTOLIN HFA;PROAIR HFA) inhaler 2 puff, 2 puff, Inhalation, Q6H PRN, Pascual Diego MD  •  atorvastatin (LIPITOR) tablet 10 mg, 10 mg, Oral, Nightly, Pascual Diego MD, 10 mg at 11/30/20 2101  •  benzonatate (TESSALON) capsule 100 mg, 100 mg, Oral, TID PRN, Pascual Diego MD  •  budesonide-formoterol (SYMBICORT) 160-4.5 MCG/ACT inhaler 2 puff, 2 puff, Inhalation, BID - RT, Vicente Quiroz MD, 2 puff at 12/01/20 0629  •  cholecalciferol (VITAMIN D3) tablet 2,000 Units, 2,000 Units, Oral, Daily, Pascual Diego MD, 2,000 Units at 12/01/20 0806  •  dexamethasone (DECADRON) injection 4 mg, 4 mg, Intravenous, Daily, Newton Woodard MD, 4 mg at 12/01/20 0805  •  dexmedetomidine (PRECEDEX) 400 mcg in 100 mL NS infusion, 0.2-1.5 mcg/kg/hr, Intravenous, Titrated, Pascual Diego MD, Last Rate: 18.4 mL/hr at 12/01/20 1750, 1.2 mcg/kg/hr at 12/01/20 1750  •  dextromethorphan polistirex ER (DELSYM) 30 MG/5ML oral suspension 60 mg, 10 mL, Oral, Q12H PRN, Pascual Diego MD  •  dextrose (D50W) 25 g/ 50mL Intravenous Solution 25 g, 25 g, Intravenous, Q15 Min PRN, Pascual Diego MD  •  dextrose (GLUTOSE) oral gel 15 g, 15 g, Oral, Q15 Min PRN, Pascual Diego  MD Gage  •  dilTIAZem (CARDIZEM) tablet 30 mg, 30 mg, Oral, Q6H, Vicente Quiroz MD, 30 mg at 12/01/20 1700  •  fentaNYL citrate (PF) (SUBLIMAZE) 2,500 mcg in sodium chloride 0.9 % 250 mL (10 mcg/mL) infusion,  mcg/hr, Intravenous, Titrated, Mele Cabrera MD, Last Rate: 20 mL/hr at 12/01/20 1346, 200 mcg/hr at 12/01/20 1346  •  glucagon (human recombinant) (GLUCAGEN DIAGNOSTIC) injection 1 mg, 1 mg, Subcutaneous, Q15 Min PRN, Pascual Diego MD  •  HYDROcodone-acetaminophen (NORCO) 5-325 MG per tablet 1 tablet, 1 tablet, Oral, Q8H PRN, Pascual Diego MD, 1 tablet at 11/29/20 1647  •  HYDROmorphone (DILAUDID) injection 2 mg, 2 mg, Intravenous, Once, Mele Cabrera MD  •  insulin detemir (LEVEMIR) injection 10 Units, 10 Units, Subcutaneous, Q12H, Vicente Quiroz MD, 10 Units at 12/01/20 0807  •  insulin lispro (humaLOG) injection 0-9 Units, 0-9 Units, Subcutaneous, Q6H, Vicente Quiroz MD, 6 Units at 12/01/20 1700  •  ipratropium (ATROVENT HFA) inhaler 2 puff, 2 puff, Inhalation, 4x Daily - RT, LoganYamilet, APRN, 2 puff at 12/01/20 1400  •  levothyroxine (SYNTHROID, LEVOTHROID) tablet 88 mcg, 88 mcg, Oral, Q AM, Pascual Diego MD, 88 mcg at 12/01/20 0635  •  melatonin tablet 3 mg, 3 mg, Oral, Nightly, Pascual Diego MD, 3 mg at 11/30/20 2101  •  metoprolol tartrate (LOPRESSOR) injection 2.5 mg, 2.5 mg, Intravenous, Q6H PRN, Adonis Melo MD, 2.5 mg at 11/29/20 1648  •  midazolam (VERSED) 1 mg/mL in dextrose (D5W) 5 % 50 mL infusion, 0.03 mg/kg/hr, Intravenous, Continuous, Mele Cabrera MD, Stopped at 12/01/20 0753  •  norepinephrine (LEVOPHED) 8,000 mcg in sodium chloride 0.9 % 250 mL infusion, 0.02-0.3 mcg/kg/min, Intravenous, Titrated, Mele Cabrera MD, Last Rate: 17.12 mL/hr at 12/01/20 0804, 0.14 mcg/kg/min at 12/01/20 0804  •  pantoprazole (PROTONIX) injection 40 mg, 40 mg, Intravenous, BID, Adonis Melo  "MD CAROLINE, 40 mg at 12/01/20 0806  •  potassium chloride 20 mEq in 50 mL IVPB, 20 mEq, Intravenous, Q1H PRN, Nikolas Boogie MD, Last Rate: 50 mL/hr at 11/21/20 0553, 20 mEq at 11/21/20 0553  •  vitamin B-12 (CYANOCOBALAMIN) tablet 1,000 mcg, 1,000 mcg, Oral, Daily, Pascual Diego MD, 1,000 mcg at 12/01/20 0806  •  vitamin C (ASCORBIC ACID) tablet 500 mg, 500 mg, Oral, BID, Pascual Diego MD, 500 mg at 12/01/20 0806  •  zinc sulfate (ZINCATE) capsule 220 mg, 220 mg, Oral, Daily, Pascual Diego MD, 220 mg at 12/01/20 0806      OBJECTIVE:  /53   Pulse 111   Temp 98.8 °F (37.1 °C) (Axillary)   Resp 28   Ht 152.4 cm (60\")   Wt 66.4 kg (146 lb 6.2 oz)   SpO2 97%   BMI 28.59 kg/m²    Physical Exam  Constitutional:       Appearance: She is ill-appearing and toxic-appearing.   HENT:      Head: Normocephalic and atraumatic.   Cardiovascular:      Rate and Rhythm: Tachycardia present. Rhythm irregular.         Assessment:  Acute hypoxic respiratory failure secondary to COVID-19-worse  COVID-19 pneumonia  Atrial fibrillation with RVR-more irregular today with increased ectopy  Septic shock on Levophed  Type 2 diabetes mellitus  Hypothyroidism  Advanced age  Hypertension    PPS:  10%    Symptoms:  Tachypnea  Debility  Agitation requiring sedation    Plan:  -She has had worsening with requirement of maximal ventilator support, including FiO2 of 100% and PEEP as high as 12.  Unfortunately, she does not respond well with desaturations to even minimal decreases in support.  She is requiring Levophed to support her blood pressure.    -Independent of MDM, 16:46 was spent in discussion via phone with her  and daughter in discussions of advanced care planning.  Her  indicates that he would want to give her every chance, but understands that she is up against much in terms of trying to recover from this.  He repeatedly asks \"what do you think?\"  I discussed that I am not able to make a " decision for him, but that I would encourage him to think about what she would want if you were able to speak to us at this time.  Ultimately, I did offer him 3 options to help guide his decision makin.  Continue full support   2.  Make a decision not to add any further measures to our current level of support.  If she were to decline, we would observe at the current level of support without adding anything further.   3.  Move toward comfort measures and aggressive management of symptoms as needed.  They are encouraged to further think about I discussed these options, but are aware that we will not move forward with any changes beyond full support unless we hear otherwise from them.    Patient was discussed in Palliative Interdisciplinary Team Meeting.    We will continue to follow.     Jude Perez D.O. 2020   Palliative Medicine

## 2020-12-02 NOTE — PROGRESS NOTES
Infectious Diseases Progress Note    Patient:  Rashard Mobley  YOB: 1928  MRN: [id number]   Admit date: 11/14/2020   Admitting Physician: Ahmet Lambert*  Primary Care Physician: Jimy Montano MD    Chief Complaint/Interval History: She remains sedated on mechanical ventilation.  Current FiO2 70% with PEEP set at 11.  She is receiving tube feeds.  She had an episode of decreased saturation this morning.  Per discussion with nursing of mucous plug was suctioned.  She has had 1 of multiple blood cultures turn positive.    Intake/Output Summary (Last 24 hours) at 12/2/2020 1643  Last data filed at 12/2/2020 1430  Gross per 24 hour   Intake 3349 ml   Output 575 ml   Net 2774 ml     Allergies:   Allergies   Allergen Reactions   • Levaquin [Levofloxacin] Rash   • Rocephin [Ceftriaxone] Rash   • Vancomycin Rash     Current Scheduled Medications:   atorvastatin, 10 mg, Oral, Nightly  budesonide-formoterol, 2 puff, Inhalation, BID - RT  cholecalciferol, 2,000 Units, Oral, Daily  dexamethasone, 4 mg, Intravenous, Daily  dilTIAZem, 30 mg, Oral, Q6H  HYDROmorphone, 2 mg, Intravenous, Once  insulin detemir, 10 Units, Subcutaneous, Q12H  insulin lispro, 0-9 Units, Subcutaneous, Q6H  ipratropium, 2 puff, Inhalation, 4x Daily - RT  levothyroxine, 88 mcg, Oral, Q AM  melatonin, 3 mg, Oral, Nightly  pantoprazole, 40 mg, Intravenous, BID  vitamin B-12, 1,000 mcg, Oral, Daily  vitamin C, 500 mg, Oral, BID  zinc sulfate, 220 mg, Oral, Daily      Current PRN Medications:  •  acetaminophen **OR** acetaminophen  •  albuterol sulfate HFA  •  benzonatate  •  dextromethorphan polistirex ER  •  dextrose  •  dextrose  •  glucagon (human recombinant)  •  HYDROcodone-acetaminophen  •  metoprolol tartrate  •  potassium chloride    Review of Systems unobtainable from patient due to sedation and mechanical ventilation    Vital Signs:  /68   Pulse 100   Temp 96.8 °F (36 °C) (Axillary)   Resp 22   Ht 152.4 cm  "(60\")   Wt 69.7 kg (153 lb 10.6 oz)   SpO2 100%   BMI 30.01 kg/m²     Physical Exam  Vital signs - reviewed.  Observed through ICU sliding glass door.  Currently sedated and comfortable appearing.  Nursing at bedside currently.  Reviewed bedside exam with nursing  No wheezing.  Abdomen with hypoactive bowel sounds.  Line sites without signs of infection  Skin without rash    Lab Results:  CBC:   Results from last 7 days   Lab Units 12/02/20  0413 12/01/20  0427 11/30/20  1052 11/30/20  0529 11/29/20  2352 11/29/20  1419 11/29/20  0549 11/27/20  0303   WBC 10*3/mm3 17.95* 24.19*  --  24.65*  --  20.19* 17.23* 24.96*   HEMOGLOBIN g/dL 6.7* 7.3* 7.5* 7.8* 8.6* 8.5* 7.9* 11.4*   HEMATOCRIT % 21.3* 22.2* 22.9* 24.1* 26.3* 25.9* 23.7* 34.1   PLATELETS 10*3/mm3 92* 95*  --  99*  --  75* 71* 121*     BMP:  Results from last 7 days   Lab Units 12/02/20 0413 12/01/20  1457 12/01/20  0427 11/30/20  0529 11/29/20  1419 11/28/20  0000   SODIUM mmol/L 135* 133* 135* 135* 136 138   POTASSIUM mmol/L 5.6* 6.1* 6.0* 5.4* 4.6 5.1   CHLORIDE mmol/L 101 100 101 100 101 103   CO2 mmol/L 30.0* 28.0 28.0 28.0 28.0 25.0   BUN mg/dL 58* 60* 62* 57* 45* 54*   CREATININE mg/dL 0.67 0.78 0.80 0.91 0.58 0.75   GLUCOSE mg/dL 123* 264* 220* 311* 274* 213*   CALCIUM mg/dL 9.2 9.3 9.6 9.5 9.7* 9.7*   ALT (SGPT) U/L 33  --  37* 40* 27 30     MRSA nasal screen negative  Blood culture from December 1, 2020-Streptococcus species not group A, group B, or Streptococcus pneumonia  (1 of 4 bottles)  Culture Results:   Blood Culture   Date Value Ref Range Status   12/01/2020 Abnormal Stain (C)  Preliminary   12/01/2020 No growth at 24 hours  Preliminary   11/29/2020 No growth at 3 days  Preliminary   11/29/2020 No growth at 3 days  Preliminary     Radiology:   Most recent chest x-ray performed on November 30, 2020:  IMPRESSION:  Impression:   1. ET tube is mildly low, with tip projecting only 2.5 cm above the  jesus.  2. Lung volumes and bilateral " infiltrates are not significantly changed.  This report was finalized on 11/30/2020 09:13 by Dr Ede Olivo, .    Additional Studies Reviewed: None    Impression:   1.  Respiratory failure secondary to COVID-19  2.  Thrombocytopenia-stable  3.  Leukocytosis-suspect dexamethasone and leukemoid reaction from COVID-19 infection.  4.  Positive blood culture-may be contaminant, but going to treat pending additional culture results and final identification  4.  Levaquin, Rocephin, and vancomycin allergies all listed    Recommendations:   Continue current treatment  Add linezolid based on positive blood culture-antibiotic allergies make antimicrobial choice difficult  Continue supportive care  Continue to follow    Pelon Campbell MD

## 2020-12-02 NOTE — PROGRESS NOTES
1           [facility] Medicine Services  INPATIENT PROGRESS NOTE    Patient Name: Rashard Mobley  Date of Admission: 11/14/2020  Today's Date: 12/02/20  Length of Stay: 18  Primary Care Physician: Jimy Montano MD    Subjective   Chief Complaint: Follow-up  HPI   It looks like patient has complicated protracted hospital stay.  She is on day 18 of her hospitalization.  From reviewing pulmonary note, this is day 4 of second intubation.  Patient completed remdesivir and dexamethasone.  Dexamethasone had to be resumed on November 29 due to worsening respiratory status.  This has been decreased to 4 mg daily.    Palliative care has been following the patient since yesterday that I know of  Dr. Perez discussed with patient's family options such as:    1.  Continue full support    2.  Make a decision not to add any further measures to our current level of support.  If she were to decline, we would observe at the current level of support without adding anything further.    3.  Move toward comfort measures and aggressive management of symptoms as needed.    Lavage earlier due to hypoxia   She is now at 70% fio2. Mechanically ventilator   + BC; afebrile  Review of Systems   Unable to partcipate with care  Objective    Temp:  [96.5 °F (35.8 °C)-98.8 °F (37.1 °C)] 96.9 °F (36.1 °C)  Heart Rate:  [] 84  Resp:  [22-28] 23  BP: ()/(47-75) 121/62  FiO2 (%):  [70 %-80 %] 70 %  Physical Exam  Vitals signs and nursing note reviewed.   Constitutional:       Appearance: She is ill-appearing.      Comments: Intubated and on mechanical ventilation   HENT:      Head: Normocephalic.      Nose:      Comments: NG in place; TFs being administered at 60mL/hr     Mouth/Throat:      Pharynx: No oropharyngeal exudate (ET tube in place).   Cardiovascular:      Rate and Rhythm: Normal rate.     Pulmonary:      Comments: On mechanical ventilation; Fi02 at 70% and PEEP at 10  Genitourinary:      Comments: Joselito  Musculoskeletal:         General: Swelling (bruising and swelling left hand) present.   Skin:     General: Skin is warm.   Neurological:      Comments: Currently on sedation with Precedex and Fentanyl           Results Review:  I have reviewed the labs, radiology results, and diagnostic studies.    Laboratory Data:   Results from last 7 days   Lab Units 12/02/20 0413 12/01/20 0427 11/30/20  1052 11/30/20  0529   WBC 10*3/mm3 17.95* 24.19*  --  24.65*   HEMOGLOBIN g/dL 6.7* 7.3* 7.5* 7.8*   HEMATOCRIT % 21.3* 22.2* 22.9* 24.1*   PLATELETS 10*3/mm3 92* 95*  --  99*        Results from last 7 days   Lab Units 12/02/20 0413 12/01/20 1457 12/01/20 0427 11/30/20  0529   SODIUM mmol/L 135* 133* 135* 135*   POTASSIUM mmol/L 5.6* 6.1* 6.0* 5.4*   CHLORIDE mmol/L 101 100 101 100   CO2 mmol/L 30.0* 28.0 28.0 28.0   BUN mg/dL 58* 60* 62* 57*   CREATININE mg/dL 0.67 0.78 0.80 0.91   CALCIUM mg/dL 9.2 9.3 9.6 9.5   BILIRUBIN mg/dL 0.4  --  0.5 0.5   ALK PHOS U/L 101  --  121* 141*   ALT (SGPT) U/L 33  --  37* 40*   AST (SGOT) U/L 21  --  24 30   GLUCOSE mg/dL 123* 264* 220* 311*       Culture Data:   Blood Culture   Date Value Ref Range Status   12/01/2020 Abnormal Stain (C)  Preliminary   12/01/2020 No growth at 24 hours  Preliminary   11/29/2020 No growth at 3 days  Preliminary   11/29/2020 No growth at 3 days  Preliminary       Radiology Data:   Imaging Results (Last 24 Hours)     ** No results found for the last 24 hours. **          I have reviewed the patient's current medications.     Assessment/Plan     Active Hospital Problems    Diagnosis   • Hyperkalemia   • Thrombocytopenia (CMS/HCC)   • Anemia   • Acute respiratory failure with hypoxia (CMS/HCC)   • Pneumonia due to COVID-19 virus   • Hypernatremia   • Leukocytosis   • Advanced age   • Atrial fibrillation with RVR (CMS/HCC)   • COVID-19 virus detected   • COVID-19 virus infection   • Diabetes (CMS/HCC)   • Essential hypertension                          Getting PRBC (1 unit per nurse Briseyda); recheck labs later  1132H calcium gluconate, d50 + insulin yesterday  0847H 12/1 - kayexalate; kayexalate reordered per nursing (NGT  Levophed 0.16 mcg/kg/min  Sedation been uptitrated 1.5mcg/hr of Precedex, Fentanyl at 300 mcg/hr  + BC on 12/1; afebrile WBC trending down; last seen by ID on 11/30. Sheis not on any abx. Called in by nurse to ID's office  Monitor off abx and defer  ID recommendation     atorvastatin, 10 mg, Oral, Nightly  budesonide-formoterol, 2 puff, Inhalation, BID - RT  cholecalciferol, 2,000 Units, Oral, Daily  dexamethasone, 4 mg, Intravenous, Daily  dilTIAZem, 30 mg, Oral, Q6H  HYDROmorphone, 2 mg, Intravenous, Once  insulin detemir, 10 Units, Subcutaneous, Q12H  insulin lispro, 0-9 Units, Subcutaneous, Q6H  ipratropium, 2 puff, Inhalation, 4x Daily - RT  levothyroxine, 88 mcg, Oral, Q AM  melatonin, 3 mg, Oral, Nightly  pantoprazole, 40 mg, Intravenous, BID  sodium polystyrene, 15 g, Nasogastric, Once  vitamin B-12, 1,000 mcg, Oral, Daily  vitamin C, 500 mg, Oral, BID  zinc sulfate, 220 mg, Oral, Daily        Discharge Planning: tbd  Electronically signed by Ahmet Lambert MD, 12/02/20, 12:43 CST.

## 2020-12-02 NOTE — PROGRESS NOTES
PULMONARY AND CRITICAL CARE PROGRESS NOTE - [facility]    Patient: Rashard Mobley    7/17/1928    MR# [id number]    Acct# [id number]  12/02/20   10:52 CST  Referring Provider: Ahmet Lambert*    Chief Complaint: Mechanically ventilated    Interval history: Pt remains mechanically ventilated due to SARS-CoV-19 infection.  Patient was evaluated through through glass door as the patient is in COVID-19 isolation in effort to preserve PPE and avoid unnecessary exposure.  The patient remains intubated and sedated on Precedex and fentanyl drips.  Levophed infusing.  O2 sat 97% on 80%% FiO2, 10 PEEP.  Hgb 6.7, platlets 92, Potassium 5.6.  No other aggravating or alleviating factors.     Meds:  atorvastatin, 10 mg, Oral, Nightly  budesonide-formoterol, 2 puff, Inhalation, BID - RT  cholecalciferol, 2,000 Units, Oral, Daily  dexamethasone, 4 mg, Intravenous, Daily  dilTIAZem, 30 mg, Oral, Q6H  HYDROmorphone, 2 mg, Intravenous, Once  insulin detemir, 10 Units, Subcutaneous, Q12H  insulin lispro, 0-9 Units, Subcutaneous, Q6H  ipratropium, 2 puff, Inhalation, 4x Daily - RT  levothyroxine, 88 mcg, Oral, Q AM  melatonin, 3 mg, Oral, Nightly  pantoprazole, 40 mg, Intravenous, BID  vitamin B-12, 1,000 mcg, Oral, Daily  vitamin C, 500 mg, Oral, BID  zinc sulfate, 220 mg, Oral, Daily      dexmedetomidine, 0.2-1.5 mcg/kg/hr, Last Rate: 1.5 mcg/kg/hr (12/02/20 0929)  fentanyl 10 mcg/mL,  mcg/hr, Last Rate: 300 mcg/hr (12/02/20 1003)  midazolam (VERSED) infusion 1 mg/mL (derrek/ped), 0.03 mg/kg/hr, Last Rate: Stopped (12/01/20 0753)  norepinephrine, 0.02-0.3 mcg/kg/min, Last Rate: 0.16 mcg/kg/min (12/02/20 0949)      Review of Systems:   Cannot obtain due to mechanical ventilation.  The patient notably is critically ill and connected to a ventilator.  As such patient cannot communicate and provide any history whatsoever, including any history of present illness or interval history since arrival or review of  systems. The interested reviewer may note this fact, as an attempt has been made at collecting and documenting these portions of the patient history, but this information is unobtainable despite attempted review and therefore cannot be documented at this time.   Ventilator Settings:        Resp Rate (Set): 22  Pressure Support (cm H2O): 0 cm H20  FiO2 (%): (S) 70 %(decreased due to high sat)  PEEP/CPAP (cm H2O): 10 cm H20  Minute Ventilation (L/min) (Obs): 11.9 L/min  Resp Rate (Observed) Vent: 23  I:E Ratio (Set): 1:2.00  I:E Ratio (Obs): 1:1.2  PIP Observed (cm H2O): 30 cm H2O     Physical Exam:  Temp:  [96.5 °F (35.8 °C)-99 °F (37.2 °C)] 96.5 °F (35.8 °C)  Heart Rate:  [] 102  Resp:  [11-28] 22  BP: ()/(49-75) 107/59  FiO2 (%):  [70 %-100 %] 70 %    Intake/Output Summary (Last 24 hours) at 12/2/2020 1052  Last data filed at 12/2/2020 0949  Gross per 24 hour   Intake 3341 ml   Output 575 ml   Net 2766 ml     SpO2 Percentage    12/02/20 1000 12/02/20 1037 12/02/20 1041   SpO2: 97% 99% 95%      GENERAL/CONSTITUTIONAL: no distress.  Pt is on vent  HEENT: atraumatic, normocephalic  NOSE: normal  NECK: jugular veins nondistended  CHEST: no paradox, no retractions.  No respiratory distress.   Vent synchrony: None  CARDIAC: Irregular rhythm  ABDOMEN: nondistended  : Deferred  EXTREMITIES: Mild edema.  NEURO:  sedated, mechanically ventilated  SKIN: no jaundice.  No rash   Results from last 7 days   Lab Units 12/02/20  0413 12/01/20  0427 11/30/20  1052 11/30/20  0529   WBC 10*3/mm3 17.95* 24.19*  --  24.65*   HEMOGLOBIN g/dL 6.7* 7.3* 7.5* 7.8*   PLATELETS 10*3/mm3 92* 95*  --  99*     Results from last 7 days   Lab Units 12/02/20  0413 12/01/20  1457 12/01/20  0427   SODIUM mmol/L 135* 133* 135*   POTASSIUM mmol/L 5.6* 6.1* 6.0*   BUN mg/dL 58* 60* 62*   CREATININE mg/dL 0.67 0.78 0.80     Results from last 7 days   Lab Units 11/30/20  0334 11/30/20  0005 11/29/20  1930   PH, ARTERIAL pH units 7.326*  7.224* 7.445   PCO2, ARTERIAL mm Hg 52.9* 67.0* 38.6   PO2 ART mm Hg 189.0* 77.3* 59.2*   FIO2 % 100 100 100     Blood Culture   Date Value Ref Range Status   11/29/2020 No growth at less than 24 hours  Preliminary   11/29/2020 No growth at less than 24 hours  Preliminary     Recent films:  No radiology results for the last day  Films reviewed personally by me.  My interpretation: no new imaging 12/02/20  Pulmonary Assessment:  1. Acute respiratory failure with hypoxia due to Covid-19  2. Atrial fibrillation, new onset  3. Leukocytosis  4. Hypothyroidism  5. Advanced age  6. Thrombocytopenia  7. Hyperkalemia   8. Anemia     Recommend:   · Day #4 ETT of 2nd intubation.  Continue mechanical ventilation.  No changes today. Titrate FiO2 for sat 90-94%  · Continue bronchodilators with Atrovent and Symbicort. Avoid albuterol due to risk of tachycardia.  · Candidate for prone positioning: no  · Remdesivir, dexamethasone completed  · Dexamethasone resumed 11/29/20 for worsening respiratory status requiring re-intubation, will titrate as tolerated.  Decreased to 4 mg IV daily 12/01/20  · Off abx at this time   · DVT prophylaxis-Lovenox on hold   · Stress ulcer prophylaxis - protonix   · Continue zinc, vitamin C, melatonin, vitamin D  · Nutrition per Dobbhoff tube  · Recommend palliative care consult if family is agreeable   · Potassium 5.6, Hgb 6.7 attending addressing   · Patients prognosis appears to be poor    Electronically signed by RUSSELL Fermin on 12/2/2020 at 10:52 CST     Physician substantive portion:  She continues to struggle.  She has had a significant spell over the course of the day today occurring after we were there to visit her on rounds, with worsening hypoxia.  This was treated with additional suctioning and respiratory treatment.  I was notified of these events.  She is on maximal normal ventilatory support at this point.  At the time of the visit she was sedated with some accessory muscle  use but no paradox no jaundiced.  Note made of some agitation at the time of her decompensation.  Not much else we can do other than resume aggressive sedation and try to promote optimal patient ventilator interaction on high supportive settings.  Prognosis seems poor.  Her  wishes to carry on and continue aggressive care.  Appreciate palliative care team.    I have seen and examined patient personally, performing a face-to-face diagnostic evaluation with plan of care reviewed and developed with APRN and nursing staff. I have addended and/or modified the above history of present illness, physical examination, and assessment and plan to reflect my findings and impressions. Essential elements of the care plan were discussed with APRN above.  Agree with findings and assessment/plan as documented above.    Electronically signed by Newton Woodard MD, on 12/2/2020, 21:38 CST

## 2020-12-03 NOTE — PLAN OF CARE
Goal Outcome Evaluation:  Plan of Care Reviewed With: other (see comments)(RN)  Progress: no change   Pt. Became hypoxic this morning during assessment.  Attempted to suction patient but she was clenching her teeth against ETT.  Sedation increased and she became more relaxed. She was lavaged and suctioned which improved her saturation.  1 Unit of PRBC infused with out complications.  While checking residual at 1600 her abdominal appeared to have increased in size and firmness.  This concern was verbalized to .  While checking her residual she began vomiting and aroused.  TF stopped and pt place on LIS and greater than 650 residual noted.  Pt then became restless and pulling at lines and tubes.  Sedation at max dose.  Will continue to monitor.

## 2020-12-03 NOTE — PROGRESS NOTES
Palliative Medicine Note    CC: unable to assess due to intubation    History:  Rashard Mobley is a 89 y.o. female   She has had waxing and waning oxygen requirements.  She did have an episode of desaturation this morning it was felt to be due to mucous plug as she did recover quicker than she normally would.  She still remains on high support with 70% FiO2 and 10 of PEEP.    ROS:  Review of Systems   Unable to perform ROS: Intubated         Current Facility-Administered Medications:   •  acetaminophen (TYLENOL) tablet 650 mg, 650 mg, Oral, Q4H PRN, 650 mg at 11/16/20 1639 **OR** acetaminophen (TYLENOL) suppository 650 mg, 650 mg, Rectal, Q4H PRN, Pascual Diego MD  •  albuterol sulfate HFA (PROVENTIL HFA;VENTOLIN HFA;PROAIR HFA) inhaler 2 puff, 2 puff, Inhalation, Q6H PRN, Pascual Diego MD  •  atorvastatin (LIPITOR) tablet 10 mg, 10 mg, Oral, Nightly, Pascual Diego MD, 10 mg at 12/01/20 2003  •  benzonatate (TESSALON) capsule 100 mg, 100 mg, Oral, TID PRN, Pascual Diego MD  •  budesonide-formoterol (SYMBICORT) 160-4.5 MCG/ACT inhaler 2 puff, 2 puff, Inhalation, BID - RT, Vicente Quiroz MD, 2 puff at 12/02/20 0822  •  cholecalciferol (VITAMIN D3) tablet 2,000 Units, 2,000 Units, Oral, Daily, Pascual Diego MD, 2,000 Units at 12/02/20 0940  •  dexamethasone (DECADRON) injection 4 mg, 4 mg, Intravenous, Daily, Newton Woodard MD, 4 mg at 12/02/20 0942  •  dexmedetomidine (PRECEDEX) 400 mcg in 100 mL NS infusion, 0.2-1.5 mcg/kg/hr, Intravenous, Titrated, Pascual Diego MD, Last Rate: 23 mL/hr at 12/02/20 1804, 1.5 mcg/kg/hr at 12/02/20 1804  •  dextromethorphan polistirex ER (DELSYM) 30 MG/5ML oral suspension 60 mg, 10 mL, Oral, Q12H PRN, Pascual Diego MD  •  dextrose (D50W) 25 g/ 50mL Intravenous Solution 25 g, 25 g, Intravenous, Q15 Min PRN, Pascual Diego MD  •  dextrose (GLUTOSE) oral gel 15 g, 15 g, Oral, Q15 Min PRN, Pascual Diego  MD Gage  •  dilTIAZem (CARDIZEM) tablet 30 mg, 30 mg, Oral, Q6H, Vicente Quiroz MD, 30 mg at 12/02/20 1719  •  fentaNYL citrate (PF) (SUBLIMAZE) 2,500 mcg in sodium chloride 0.9 % 250 mL (10 mcg/mL) infusion,  mcg/hr, Intravenous, Titrated, Mele Cabrera MD, Last Rate: 30 mL/hr at 12/02/20 1003, 300 mcg/hr at 12/02/20 1003  •  glucagon (human recombinant) (GLUCAGEN DIAGNOSTIC) injection 1 mg, 1 mg, Subcutaneous, Q15 Min PRN, Pascual Diego MD  •  HYDROcodone-acetaminophen (NORCO) 5-325 MG per tablet 1 tablet, 1 tablet, Oral, Q8H PRN, Pascual Diego MD, 1 tablet at 11/29/20 1647  •  HYDROmorphone (DILAUDID) injection 2 mg, 2 mg, Intravenous, Once, Mele Cabrera MD  •  insulin detemir (LEVEMIR) injection 10 Units, 10 Units, Subcutaneous, Q12H, Vicente Quiroz MD, 10 Units at 12/02/20 0913  •  insulin lispro (humaLOG) injection 0-9 Units, 0-9 Units, Subcutaneous, Q6H, Vicente Quiroz MD, 2 Units at 12/02/20 1712  •  ipratropium (ATROVENT HFA) inhaler 2 puff, 2 puff, Inhalation, 4x Daily - RT, LoganYamilet APRN, 2 puff at 12/02/20 1522  •  levothyroxine (SYNTHROID, LEVOTHROID) tablet 88 mcg, 88 mcg, Oral, Q AM, Pascual Diego MD, 88 mcg at 12/02/20 0523  •  Linezolid (ZYVOX) 600 mg 300 mL, 600 mg, Intravenous, Q12H, Pelon Romero MD, Last Rate: 300 mL/hr at 12/02/20 1757, 600 mg at 12/02/20 1757  •  melatonin tablet 3 mg, 3 mg, Oral, Nightly, Pascual Diego MD, 3 mg at 12/01/20 2003  •  metoprolol tartrate (LOPRESSOR) injection 2.5 mg, 2.5 mg, Intravenous, Q6H PRN, Adonis Melo MD, 2.5 mg at 11/29/20 1648  •  midazolam (VERSED) 1 mg/mL in dextrose (D5W) 5 % 50 mL infusion, 0.03 mg/kg/hr, Intravenous, Continuous, Mele Cabrera MD, Stopped at 12/01/20 0753  •  norepinephrine (LEVOPHED) 8,000 mcg in sodium chloride 0.9 % 250 mL infusion, 0.02-0.3 mcg/kg/min, Intravenous, Titrated, Mele Cabrera MD, Last  "Rate: 17.12 mL/hr at 12/02/20 1817, 0.14 mcg/kg/min at 12/02/20 1817  •  pantoprazole (PROTONIX) injection 40 mg, 40 mg, Intravenous, BID, Adonis Melo MD, 40 mg at 12/02/20 1000  •  potassium chloride 20 mEq in 50 mL IVPB, 20 mEq, Intravenous, Q1H PRN, Nikolas Boogie MD, Last Rate: 50 mL/hr at 11/21/20 0553, 20 mEq at 11/21/20 0553  •  vitamin B-12 (CYANOCOBALAMIN) tablet 1,000 mcg, 1,000 mcg, Oral, Daily, Pascual Diego MD, 1,000 mcg at 12/02/20 1002  •  vitamin C (ASCORBIC ACID) tablet 500 mg, 500 mg, Oral, BID, Pascual Diego MD, 500 mg at 12/02/20 0940  •  zinc sulfate (ZINCATE) capsule 220 mg, 220 mg, Oral, Daily, Pascual Diego MD, 220 mg at 12/02/20 0940      OBJECTIVE:  /63   Pulse 102   Temp 95.5 °F (35.3 °C) (Axillary)   Resp 22   Ht 152.4 cm (60\")   Wt 69.7 kg (153 lb 10.6 oz)   SpO2 96%   BMI 30.01 kg/m²    Physical Exam  Constitutional:       Appearance: She is ill-appearing and toxic-appearing.      Interventions: She is intubated.   Cardiovascular:      Rate and Rhythm: Regular rhythm. Tachycardia present.   Pulmonary:      Effort: Accessory muscle usage and respiratory distress present. She is intubated.         Assessment:  Acute hypoxic respiratory failure secondary to COVID-19-worse  COVID-19 pneumonia  Atrial fibrillation with RVR-more irregular today with increased ectopy  Septic shock on Levophed  Type 2 diabetes mellitus  Hypothyroidism  Advanced age  Hypertension    PPS:  10%    Symptoms:  Tachypnea  Debility  Agitation requiring sedation    Plan:  -I called to speak with both her  and daughter via phone.  17 minutes were spent in total in discussion of her wishes toward advanced care planning.  She does not have an advanced directive, but her  would be her legal surrogate in the absence of this.  At this time, after discussion of options toward full support, avoidance of escalation, or moving toward comfort measures, he believes that " "she would want to continue with the current level of support and with full interventions toward \"any inkling of hope.\"  He indicates she is \"tough\" and \"special\" and notes he is hoping she will be able to pull through this.  However, he also does not want her to suffer.  Sedation helps to alleviate discomfort, though I did share that the considerable support and intervention she is receiving could be uncomfortable and be perceived as suffering.   Despite this understanding, he wishes to proceed with full support at this time.    -We did further discuss the potential of setting a time course, after which we would reevaluate.  If she had not progressed, we may consider a change in course or goals.  Of course any direction of her care would be undergone in the best knowledge of what her wishes would be.  He did say he would consider this.    Patient was discussed in Palliative Interdisciplinary Team Meeting, with Dr. Lambert and with her nurse, Luis Fernando.    Jude Perez D.O. 12/2/2020   Palliative Medicine      "

## 2020-12-03 NOTE — PROGRESS NOTES
PULMONARY AND CRITICAL CARE PROGRESS NOTE - [facility]    Patient: Rashard Mobley    7/17/1928    MR# [id number]    Acct# [id number]  12/03/20   08:29 CST  Referring Provider: Ramiro Prieto DO    Chief Complaint: Mechanically ventilated    Interval history: Pt remains mechanically ventilated due to SARS-CoV-19 infection.  Patient was evaluated through through glass door as the patient is in COVID-19 isolation in effort to preserve PPE and avoid unnecessary exposure.  The patient remains intubated and sedated on Precedex, versed, and fentanyl drips.  Levophed infusing.  O2 sat 93%, ET33% on 65% FiO2, 10 PEEP.  Nursing reports that tube feed is on hold and NG is to low intermittent suctioning secondary to vomiting and residual greater than 650 mL.  No other aggravating or alleviating factors.     Meds:  atorvastatin, 10 mg, Oral, Nightly  budesonide-formoterol, 2 puff, Inhalation, BID - RT  cholecalciferol, 2,000 Units, Oral, Daily  dexamethasone, 4 mg, Intravenous, Daily  dilTIAZem, 30 mg, Oral, Q6H  HYDROmorphone, 2 mg, Intravenous, Once  insulin detemir, 10 Units, Subcutaneous, Q12H  insulin lispro, 0-9 Units, Subcutaneous, Q6H  ipratropium, 2 puff, Inhalation, 4x Daily - RT  levothyroxine, 88 mcg, Oral, Q AM  Linezolid, 600 mg, Intravenous, Q12H  melatonin, 3 mg, Oral, Nightly  pantoprazole, 40 mg, Intravenous, BID  vitamin B-12, 1,000 mcg, Oral, Daily  vitamin C, 500 mg, Oral, BID  zinc sulfate, 220 mg, Oral, Daily      dexmedetomidine, 0.2-1.5 mcg/kg/hr, Last Rate: 1.5 mcg/kg/hr (12/03/20 0648)  fentanyl 10 mcg/mL,  mcg/hr, Last Rate: 300 mcg/hr (12/03/20 0525)  midazolam, 1-10 mg/hr, Last Rate: 1 mg/hr (12/03/20 0430)  norepinephrine, 0.02-0.3 mcg/kg/min, Last Rate: 0.12 mcg/kg/min (12/03/20 0430)      Review of Systems:   Cannot obtain due to mechanical ventilation.  The patient notably is critically ill and connected to a ventilator.  As such patient cannot communicate and  provide any history whatsoever, including any history of present illness or interval history since arrival or review of systems. The interested reviewer may note this fact, as an attempt has been made at collecting and documenting these portions of the patient history, but this information is unobtainable despite attempted review and therefore cannot be documented at this time.   Ventilator Settings:        Resp Rate (Set): 22  Pressure Support (cm H2O): 0 cm H20  FiO2 (%): 65 %  PEEP/CPAP (cm H2O): 10 cm H20  Minute Ventilation (L/min) (Obs): 11.2 L/min  Resp Rate (Observed) Vent: 22  I:E Ratio (Set): 1:2.00  I:E Ratio (Obs): 1:2.0  PIP Observed (cm H2O): 35 cm H2O     Physical Exam:  Temp:  [95.5 °F (35.3 °C)-97 °F (36.1 °C)] 96.8 °F (36 °C)  Heart Rate:  [] 89  Resp:  [22-24] 22  BP: ()/(47-89) 118/66  FiO2 (%):  [65 %-100 %] 65 %    Intake/Output Summary (Last 24 hours) at 12/3/2020 0829  Last data filed at 12/3/2020 0738  Gross per 24 hour   Intake 3776.37 ml   Output 1000 ml   Net 2776.37 ml     SpO2 Percentage    12/03/20 0644 12/03/20 0651 12/03/20 0700   SpO2: 95% 94% 93%      GENERAL/CONSTITUTIONAL: no distress.  Pt is on vent  HEENT: atraumatic, normocephalic  NOSE: normal  NECK: jugular veins nondistended  CHEST: no paradox, no retractions.  No respiratory distress.   Vent synchrony: None  CARDIAC: Irregular rhythm  ABDOMEN: nondistended  : Deferred  EXTREMITIES: Mild edema.  NEURO:  sedated, mechanically ventilated  SKIN: no jaundice.  No rash   Results from last 7 days   Lab Units 12/03/20  0408 12/02/20  1726 12/02/20  0413   WBC 10*3/mm3 17.45* 13.61* 17.95*   HEMOGLOBIN g/dL 8.2* 7.9* 6.7*   PLATELETS 10*3/mm3 91* 73* 92*     Results from last 7 days   Lab Units 12/03/20  0408 12/02/20  1726 12/02/20  0413   SODIUM mmol/L 137 136 135*   POTASSIUM mmol/L 4.9 5.6* 5.6*   BUN mg/dL 49* 58* 58*   CREATININE mg/dL 0.52* 0.64 0.67     Results from last 7 days   Lab Units 11/30/20  0334  11/30/20  0005 11/29/20  1930   PH, ARTERIAL pH units 7.326* 7.224* 7.445   PCO2, ARTERIAL mm Hg 52.9* 67.0* 38.6   PO2 ART mm Hg 189.0* 77.3* 59.2*   FIO2 % 100 100 100     Blood Culture   Date Value Ref Range Status   11/29/2020 No growth at less than 24 hours  Preliminary   11/29/2020 No growth at less than 24 hours  Preliminary     Recent films:  No radiology results for the last day  Films reviewed personally by me.  My interpretation: no new imaging   Pulmonary Assessment:  1. Acute respiratory failure with hypoxia due to Covid-19  2. Atrial fibrillation, new onset  3. Leukocytosis  4. Hypothyroidism  5. Advanced age  6. Thrombocytopenia  7. Hyperkalemia   8. Anemia   9. +BC gram-positive cocci in pairs and chains    Recommend:   · Day #5 ETT of 2nd intubation.  Continue mechanical ventilation.  No changes today. Titrate FiO2 for sat 90-94%  · Continue bronchodilators with Atrovent and Symbicort. Avoid albuterol due to risk of tachycardia.  · Candidate for prone positioning: no  · Remdesivir, dexamethasone completed  · Dexamethasone resumed 11/29/20 for worsening respiratory status requiring re-intubation, will titrate as tolerated.  Decreased to 4 mg IV daily 12/01/20  · zyvox per ID for possible bacteremia   · DVT prophylaxis-Lovenox on hold   · Stress ulcer prophylaxis - protonix   · Continue zinc, vitamin C, melatonin, vitamin D  · Nutrition per Dobbhoff tube - currently on hold 2' high residuals and vomiting  · Patients prognosis appears to be poor    Electronically signed by RUSSELL Lacey on 12/3/2020 at 08:29 CST     Physician substantive portion:  I spoke with her daughter regarding her course this morning.  She is sedated, appears relatively comfortable today.  No accessory muscle use.  She is ill-appearing no jaundice.  We have her on ongoing Decadron.  We will continue that for now.  On maximal support.  Prognosis continues to seem poor.  Continue other adjuvant therapies, antibiotics  per ID.  Unfortunately nothing additional to offer.    I have seen and examined patient personally, performing a face-to-face diagnostic evaluation with plan of care reviewed and developed with APRN and nursing staff. I have addended and/or modified the above history of present illness, physical examination, and assessment and plan to reflect my findings and impressions. Essential elements of the care plan were discussed with APRN above.  Agree with findings and assessment/plan as documented above.    Electronically signed by Newton Woodard MD, on 12/3/2020, 10:31 CST

## 2020-12-03 NOTE — PROGRESS NOTES
[facility] Medicine Services  INPATIENT PROGRESS NOTE    Length of Stay: 19  Date of Admission: 11/14/2020  Primary Care Physician: Jimy Montano MD    Subjective     Chief Complaint:     Respiratory failure, COVID-19 pneumonia.    HPI     The patient remains intubated and sedated on Precedex, Versed and fentanyl drips.  She remains on pressors with Levophed infusing currently.  Ventilator settings are 10 of PEEP with 65% FiO2 with saturations in the low 90s.  Tube feeds are on hold secondary to high residuals.  No prokinetic agents have been initiated yet and that will be ordered today.  Infectious diseases has been consulted and is following the patient.  Linezolid was added to the patient's regimen based on positive blood cultures.  Pulmonology is also seeing, treating and evaluating the patient.  Palliative care is following and has had discussions with the patient's  and daughter who indicate they would like to continue full interventions.  This is day 5 of her second course of endotracheal intubation with ventilatory support.  Dexamethasone has been decreased to 4 mg IV daily.  Inflammatory markers this a.m. shows C-reactive protein increasingly high at 9.98, ferritin 1887.  White blood cell count elevated at 17,500 likely secondary to steroids.  Hemoglobin 8.2 and platelets 91,000.  9% bands noted on differential.    Review of Systems     All pertinent negatives and positives are as above. All other systems have been reviewed and are negative unless otherwise stated.     Objective    Temp:  [95.5 °F (35.3 °C)-96.8 °F (36 °C)] 96.8 °F (36 °C)  Heart Rate:  [] 91  Resp:  [20-24] 20  BP: ()/(52-89) 117/64  FiO2 (%):  [65 %-100 %] 65 %    Lab Results (last 24 hours)     Procedure Component Value Units Date/Time    Blood Culture - Blood, Arm, Right [[id number]]  (Abnormal) Collected: 12/01/20 0844    Specimen: Blood from Arm, Right Updated: 12/03/20  1059     Blood Culture Streptococcus species     Isolated from Aerobic Bottle     Gram Stain Aerobic Bottle Gram positive cocci in pairs and chains    Blood Culture With LUIS - Blood, Arm, Left [[id number]] Collected: 11/29/20 0942    Specimen: Blood from Arm, Left Updated: 12/03/20 1045     Blood Culture No growth at 4 days    Blood Culture With LUIS - Blood, Hand, Right [[id number]] Collected: 11/29/20 0937    Specimen: Blood from Hand, Right Updated: 12/03/20 1045     Blood Culture No growth at 4 days    Blood Culture - Blood, Arm, Left [[id number]] Collected: 12/01/20 0844    Specimen: Blood from Arm, Left Updated: 12/03/20 0915     Blood Culture No growth at 2 days    CBC & Differential [[id number]]  (Abnormal) Collected: 12/03/20 0408    Specimen: Blood Updated: 12/03/20 0656    Narrative:      The following orders were created for panel order CBC & Differential.  Procedure                               Abnormality         Status                     ---------                               -----------         ------                     CBC Auto Differential[[id number]]        Abnormal            Final result                 Please view results for these tests on the individual orders.    CBC Auto Differential [[id number]]  (Abnormal) Collected: 12/03/20 0408    Specimen: Blood Updated: 12/03/20 0656     WBC 17.45 10*3/mm3      RBC 2.68 10*6/mm3      Hemoglobin 8.2 g/dL      Hematocrit 25.4 %      MCV 94.8 fL      MCH 30.6 pg      MCHC 32.3 g/dL      RDW 20.8 %      RDW-SD 70.5 fl      Platelets 91 10*3/mm3     Manual Differential [[id number]]  (Abnormal) Collected: 12/03/20 0408    Specimen: Blood Updated: 12/03/20 0656     Neutrophil % 84.0 %      Lymphocyte % 0.0 %      Monocyte % 5.0 %      Bands %  9.0 %      Metamyelocyte % 1.0 %      Myelocyte % 1.0 %      Neutrophils Absolute 16.23 10*3/mm3      Lymphocytes Absolute 0.00 10*3/mm3      Monocytes Absolute 0.87 10*3/mm3      Anisocytosis Slight/1+     Hypochromia  Slight/1+     Poikilocytes Slight/1+     Polychromasia Slight/1+     WBC Morphology Normal     Platelet Estimate Decreased    POC Glucose Once [[id number]]  (Normal) Collected: 12/03/20 0632    Specimen: Blood Updated: 12/03/20 0646     Glucose 75 mg/dL      Comment: : [id number] Nba HeatherMeter ID: [id number]       Ferritin [[id number]]  (Abnormal) Collected: 12/03/20 0408    Specimen: Blood Updated: 12/03/20 0523     Ferritin 1,137.00 ng/mL     Narrative:      Results may be falsely decreased if patient taking Biotin.      Comprehensive Metabolic Panel [[id number]]  (Abnormal) Collected: 12/03/20 0408    Specimen: Blood Updated: 12/03/20 0520     Glucose 65 mg/dL      BUN 49 mg/dL      Creatinine 0.52 mg/dL      Sodium 137 mmol/L      Potassium 4.9 mmol/L      Chloride 101 mmol/L      CO2 29.0 mmol/L      Calcium 9.1 mg/dL      Total Protein 5.1 g/dL      Albumin 2.40 g/dL      ALT (SGPT) 32 U/L      AST (SGOT) 21 U/L      Alkaline Phosphatase 91 U/L      Total Bilirubin 0.5 mg/dL      eGFR Non African Amer 110 mL/min/1.73      Globulin 2.7 gm/dL      A/G Ratio 0.9 g/dL      BUN/Creatinine Ratio 94.2     Anion Gap 7.0 mmol/L     Narrative:      GFR Normal >60  Chronic Kidney Disease <60  Kidney Failure <15      CK [[id number]]  (Abnormal) Collected: 12/03/20 0408    Specimen: Blood Updated: 12/03/20 0520     Creatine Kinase 19 U/L     Lactate Dehydrogenase [[id number]]  (Abnormal) Collected: 12/03/20 0408    Specimen: Blood Updated: 12/03/20 0519      U/L     C-reactive Protein [[id number]]  (Abnormal) Collected: 12/03/20 0408    Specimen: Blood Updated: 12/03/20 0517     C-Reactive Protein 9.98 mg/dL     POC Glucose Once [[id number]]  (Normal) Collected: 12/03/20 0007    Specimen: Blood Updated: 12/03/20 0028     Glucose 113 mg/dL      Comment: : [id number] Nba HeatherMeter ID: [id number]       Basic Metabolic Panel [[id number]]  (Abnormal) Collected: 12/02/20 1726    Specimen: Blood Updated:  12/02/20 1836     Glucose 174 mg/dL      BUN 58 mg/dL      Creatinine 0.64 mg/dL      Sodium 136 mmol/L      Potassium 5.6 mmol/L      Chloride 101 mmol/L      CO2 29.0 mmol/L      Calcium 9.0 mg/dL      eGFR Non African Amer 87 mL/min/1.73      BUN/Creatinine Ratio 90.6     Anion Gap 6.0 mmol/L     Narrative:      GFR Normal >60  Chronic Kidney Disease <60  Kidney Failure <15      CBC (No Diff) [[id number]]  (Abnormal) Collected: 12/02/20 1726    Specimen: Blood Updated: 12/02/20 1828     WBC 13.61 10*3/mm3      RBC 2.65 10*6/mm3      Hemoglobin 7.9 g/dL      Hematocrit 25.4 %      MCV 95.8 fL      MCH 29.8 pg      MCHC 31.1 g/dL      RDW 20.6 %      RDW-SD 71.0 fl      Platelets 73 10*3/mm3     POC Glucose Once [[id number]]  (Abnormal) Collected: 12/02/20 1700    Specimen: Blood Updated: 12/02/20 1711     Glucose 171 mg/dL      Comment: : [id number] Lewis MujicaMeter ID: [id number]       Blood Culture ID, PCR - Blood, Arm, Right [[id number]]  (Abnormal) Collected: 12/01/20 0844    Specimen: Blood from Arm, Right Updated: 12/02/20 1158     BCID, PCR Streptococcus spp, not A, B, or pneumoniae. Identification by BCID PCR.     BOTTLE TYPE Aerobic Bottle          Imaging Results (Last 24 Hours)     ** No results found for the last 24 hours. **             Intake/Output Summary (Last 24 hours) at 12/3/2020 1151  Last data filed at 12/3/2020 0738  Gross per 24 hour   Intake 2902.37 ml   Output 650 ml   Net 2252.37 ml       Physical Exam  Constitutional:       Appearance: She is obese. She is ill-appearing.      Interventions: She is sedated and intubated.   HENT:      Right Ear: External ear normal.      Left Ear: External ear normal.      Mouth/Throat:      Mouth: Mucous membranes are dry.      Pharynx: Oropharynx is clear.   Eyes:      General: No scleral icterus.     Conjunctiva/sclera: Conjunctivae normal.   Neck:      Musculoskeletal: Neck supple.   Cardiovascular:      Rate and Rhythm: Normal rate and regular  rhythm.      Heart sounds: Normal heart sounds.   Pulmonary:      Effort: She is intubated.      Breath sounds: Transmitted upper airway sounds:  Bilaterally. Decreased breath sounds present.   Abdominal:      General: Abdomen is flat. Bowel sounds are normal.      Palpations: Abdomen is soft. There is no mass.   Musculoskeletal:         General: Swelling ( Left upper extremity) present. No deformity.   Lymphadenopathy:      Cervical: No cervical adenopathy.   Skin:     General: Skin is warm and dry.      Findings: Bruising ( Left upper extremity and forearm accompanied by significant edema.) present.           Results Review:  I have reviewed the labs, radiology results, and diagnostic studies since my last progress note and made treatment changes reflective of the results.   I have reviewed the current medications.    Assessment/Plan     Active Hospital Problems    Diagnosis   • **Pneumonia due to COVID-19 virus   • Hyperkalemia   • Thrombocytopenia (CMS/HCC)   • Anemia   • Acute respiratory failure with hypoxia (CMS/HCC)   • Hypernatremia   • Leukocytosis   • Advanced age   • Atrial fibrillation with RVR (CMS/HCC)   • Diabetes (CMS/HCC)   • Essential hypertension       PLAN:  Continue dexamethasone  Continue adjunctive treatments  Antibiotic management per infectious diseases  Ventilator management per pulmonology  Continue tube feeds  Reglan 5 mg IV every 6 hours secondary to large feeding tube residuals    Electronically signed by Ramiro Prieto DO, 12/03/20, 11:51 CST.

## 2020-12-04 NOTE — THERAPY DISCHARGE NOTE
Acute Care - Physical Therapy Discharge Summary  [facility]       Patient Name: Rashard Mobley  : 1928  MRN: [id number]    Today's Date: 2020  Onset of Illness/Injury or Date of Surgery: 20       Referring Physician: Dr. Melo      Admit Date: 2020      PT Recommendation and Plan    Visit Dx:    ICD-10-CM ICD-9-CM   1. COVID-19 virus infection  U07.1 079.89   2. Pneumonia due to infectious organism, unspecified laterality, unspecified part of lung  J18.9 486   3. Atrial fibrillation with RVR (CMS/[facility])  I48.91 427.31   4. Hypoxic  R09.02 799.02   5. Impaired mobility  Z74.09 799.89   6. COVID-19 virus detected  U07.1 079.89   7. Acute respiratory failure with hypoxia (CMS/[facility])  J96.01 518.81   8. Pneumonia due to COVID-19 virus  U07.1 480.8    J12.89                Rehab Goal Summary     Row Name 20 0945             Bed Mobility Goal 1 (PT)    Activity/Assistive Device (Bed Mobility Goal 1, PT)  rolling to left;rolling to right;scooting;bridging  -MF      [place] Level/Cues Needed (Bed Mobility Goal 1, PT)  standby assist  -MF      Time Frame (Bed Mobility Goal 1, PT)  by discharge  -MF      Progress/Outcomes (Bed Mobility Goal 1, PT)  goal not met  -MF         ROM Goal 1 (PT)    ROM Goal 1 (PT)  AROM x20 reps all 4 extremities  -MF      Time Frame (ROM Goal 1, PT)  by discharge  -MF      Progress/Outcome (ROM Goal 1, PT)  goal not met  -MF        User Key  (r) = Recorded By, (t) = Taken By, (c) = Cosigned By    Initials Name Provider Type Discipline    Paula Reese PTA Physical Therapy Assistant PT              PT Discharge Summary  Reason for Discharge: other (comment), Unable to participate(Pt. on vent. Nursing performing ROM.)      Paula Kim PTA   2020

## 2020-12-04 NOTE — PROGRESS NOTES
Infectious Diseases Progress Note    Patient:  Rashard Mobley  YOB: 1928  MRN: [id number]   Admit date: 11/14/2020   Admitting Physician: Ramiro Prieto DO  Primary Care Physician: Jimy Montano MD    Chief Complaint/Interval History: She is on 70% FiO2.  She is on 10 of PEEP.  She is on low-dose Levophed.  She had high residuals yesterday and an episode of emesis.  Tube feeds have not been resumed.  She has had no bowel movement since the 30th.  Reglan added.    Intake/Output Summary (Last 24 hours) at 12/4/2020 0911  Last data filed at 12/4/2020 0618  Gross per 24 hour   Intake 1776.08 ml   Output 425 ml   Net 1351.08 ml     Allergies:   Allergies   Allergen Reactions   • Levaquin [Levofloxacin] Rash   • Rocephin [Ceftriaxone] Rash   • Vancomycin Rash     Current Scheduled Medications:   atorvastatin, 10 mg, Oral, Nightly  budesonide-formoterol, 2 puff, Inhalation, BID - RT  cholecalciferol, 2,000 Units, Oral, Daily  dexamethasone, 4 mg, Intravenous, Daily  dilTIAZem, 30 mg, Oral, Q6H  HYDROmorphone, 2 mg, Intravenous, Once  insulin detemir, 10 Units, Subcutaneous, Q12H  insulin lispro, 0-9 Units, Subcutaneous, Q6H  ipratropium, 2 puff, Inhalation, 4x Daily - RT  levothyroxine, 88 mcg, Oral, Q AM  Linezolid, 600 mg, Intravenous, Q12H  melatonin, 3 mg, Oral, Nightly  metoclopramide, 5 mg, Intravenous, Q6H  pantoprazole, 40 mg, Intravenous, BID  vitamin B-12, 1,000 mcg, Oral, Daily  vitamin C, 500 mg, Oral, BID  zinc sulfate, 220 mg, Oral, Daily      Current PRN Medications:  •  acetaminophen **OR** acetaminophen  •  albuterol sulfate HFA  •  benzonatate  •  dextromethorphan polistirex ER  •  dextrose  •  dextrose  •  glucagon (human recombinant)  •  HYDROcodone-acetaminophen  •  metoprolol tartrate  •  potassium chloride    Review of Systems unobtainable from patient due to sedation and mechanical ventilation    Vital Signs:  /58 (BP Location: Right arm, Patient Position: Lying)   " Pulse 88   Temp 96.8 °F (36 °C) (Oral)   Resp 20   Ht 152.4 cm (60\")   Wt 72.2 kg (159 lb 2.8 oz)   SpO2 93%   BMI 31.09 kg/m²     Physical Exam  Vital signs - reviewed.  Line/IV site - No erythema, warmth, induration, or tenderness.  Per discussion with nursing has good air movement.  No wheezing.  No crackles.    Lab Results:  CBC:   Results from last 7 days   Lab Units 12/04/20  0316 12/03/20  0408 12/02/20  1726 12/02/20  0413 12/01/20  0427 11/30/20  1052 11/30/20  0529  11/29/20  1419   WBC 10*3/mm3 15.01* 17.45* 13.61* 17.95* 24.19*  --  24.65*  --  20.19*   HEMOGLOBIN g/dL 7.3* 8.2* 7.9* 6.7* 7.3* 7.5* 7.8*   < > 8.5*   HEMATOCRIT % 23.4* 25.4* 25.4* 21.3* 22.2* 22.9* 24.1*   < > 25.9*   PLATELETS 10*3/mm3 94* 91* 73* 92* 95*  --  99*  --  75*    < > = values in this interval not displayed.     BMP:  Results from last 7 days   Lab Units 12/04/20 0316 12/03/20 0408 12/02/20  1726 12/02/20  0413 12/01/20  1457 12/01/20  0427 11/30/20  0529 11/29/20  1419 11/28/20  0000   SODIUM mmol/L 133* 137 136 135* 133* 135* 135* 136 138   POTASSIUM mmol/L 5.1 4.9 5.6* 5.6* 6.1* 6.0* 5.4* 4.6 5.1   CHLORIDE mmol/L 98 101 101 101 100 101 100 101 103   CO2 mmol/L 27.0 29.0 29.0 30.0* 28.0 28.0 28.0 28.0 25.0   BUN mg/dL 46* 49* 58* 58* 60* 62* 57* 45* 54*   CREATININE mg/dL 0.52* 0.52* 0.64 0.67 0.78 0.80 0.91 0.58 0.75   GLUCOSE mg/dL 135* 65 174* 123* 264* 220* 311* 274* 213*   CALCIUM mg/dL 8.9 9.1 9.0 9.2 9.3 9.6 9.5 9.7* 9.7*   ALT (SGPT) U/L 22 32  --  33  --  37* 40* 27 30     Culture Results:   Blood Culture   Date Value Ref Range Status   12/01/2020 Streptococcus species (C)  Preliminary   12/01/2020 No growth at 2 days  Preliminary   11/29/2020 No growth at 4 days  Preliminary   11/29/2020 No growth at 4 days  Preliminary     Radiology: None  Additional Studies Reviewed: None    Impression:   Respiratory failure secondary to COVID-19  Thrombocytopenia stable  Positive blood culture for strep " species  Allergies listed to Levaquin, Rocephin, and vancomycin    Recommendations:   Continue supportive care  Continue dexamethasone  Continue linezolid  No new recommendations at present    Pelon Campbell MD

## 2020-12-04 NOTE — PLAN OF CARE
Goal Outcome Evaluation:  Plan of Care Reviewed With: other (see comments)  Progress: declining  Outcome Summary: Pt's TF was recently changed to Diabetisource AC with goal rate of 60ml/hr. She has not been able to tolerate TF at goal rate d/t high residuals. Per RN, pt had episode of emesis yesterday and has hypoactive bowel sounds. Pt is on pressors, decreased TF to trophic rate of 15ml/hr with 25ml/hr water flush. RN to monitor for signs/symptoms of intolerance. Will continue to follow and adjust TF rate as able.

## 2020-12-04 NOTE — PROGRESS NOTES
PULMONARY AND CRITICAL CARE PROGRESS NOTE - [facility]    Patient: Rashard Mobley    7/17/1928    MR# [id number]    Acct# [id number]  12/04/20   12:06 CST  Referring Provider: Ramiro Prieto DO    Chief Complaint: Mechanically ventilated    Interval history: Pt remains mechanically ventilated due to SARS-CoV-19 infection.  Patient was evaluated through through glass door as the patient is in COVID-19 isolation in effort to preserve PPE and avoid unnecessary exposure.  The patient remains intubated and sedated.  She has had no additional problems reported per nursing.  She is on 10 cm of PEEP and 0.7 FiO2.  Meds:  atorvastatin, 10 mg, Oral, Nightly  budesonide-formoterol, 2 puff, Inhalation, BID - RT  cholecalciferol, 2,000 Units, Oral, Daily  dexamethasone, 4 mg, Intravenous, Daily  dilTIAZem, 30 mg, Oral, Q6H  HYDROmorphone, 2 mg, Intravenous, Once  insulin detemir, 10 Units, Subcutaneous, Q12H  insulin lispro, 0-9 Units, Subcutaneous, Q6H  ipratropium, 2 puff, Inhalation, 4x Daily - RT  levothyroxine, 88 mcg, Oral, Q AM  Linezolid, 600 mg, Intravenous, Q12H  melatonin, 3 mg, Oral, Nightly  metoclopramide, 5 mg, Intravenous, Q6H  pantoprazole, 40 mg, Intravenous, BID  vitamin B-12, 1,000 mcg, Oral, Daily  vitamin C, 500 mg, Oral, BID  zinc sulfate, 220 mg, Oral, Daily      dexmedetomidine, 0.2-1.5 mcg/kg/hr, Last Rate: 0.5 mcg/kg/hr (12/04/20 0618)  fentanyl 10 mcg/mL,  mcg/hr, Last Rate: 200 mcg/hr (12/04/20 0631)  midazolam, 1-10 mg/hr, Last Rate: 3 mg/hr (12/04/20 0618)  norepinephrine, 0.02-0.3 mcg/kg/min, Last Rate: 0.08 mcg/kg/min (12/04/20 0618)      Review of Systems:   Cannot obtain due to mechanical ventilation.  The patient notably is critically ill and connected to a ventilator.  As such patient cannot communicate and provide any history whatsoever, including any history of present illness or interval history since arrival or review of systems. The interested reviewer may  note this fact, as an attempt has been made at collecting and documenting these portions of the patient history, but this information is unobtainable despite attempted review and therefore cannot be documented at this time.   Ventilator Settings:        Resp Rate (Set): 20  Pressure Support (cm H2O): 0 cm H20  FiO2 (%): 70 %  PEEP/CPAP (cm H2O): 10 cm H20  Minute Ventilation (L/min) (Obs): 11.2 L/min  Resp Rate (Observed) Vent: 21  I:E Ratio (Set): 1:2.30  I:E Ratio (Obs): 1:2.3  PIP Observed (cm H2O): 34 cm H2O     Physical Exam:  Temp:  [96.5 °F (35.8 °C)-98 °F (36.7 °C)] 96.5 °F (35.8 °C)  Heart Rate:  [] 86  Resp:  [18-22] 20  BP: ()/(45-70) 105/53  FiO2 (%):  [60 %-70 %] 70 %    Intake/Output Summary (Last 24 hours) at 12/4/2020 1206  Last data filed at 12/4/2020 1100  Gross per 24 hour   Intake 2003.08 ml   Output 425 ml   Net 1578.08 ml     SpO2 Percentage    12/04/20 1051 12/04/20 1100 12/04/20 1200   SpO2: 93% 95% 92%      GENERAL/CONSTITUTIONAL: no distress.  Pt is on vent  HEENT: atraumatic, normocephalic  NOSE: normal  NECK: jugular veins nondistended  CHEST: no paradox, no retractions.  No respiratory distress.   Mild accessory muscle use vent synchrony: Good with passive breathing and respiratory rate 20.  Saturation 92.  CARDIAC: Irregular rhythm  ABDOMEN: nondistended  : Deferred  EXTREMITIES: Mild edema.  NEURO:  sedated, mechanically ventilated  SKIN: no jaundice.  No rash   Results from last 7 days   Lab Units 12/04/20  0316 12/03/20  0408 12/02/20  1726   WBC 10*3/mm3 15.01* 17.45* 13.61*   HEMOGLOBIN g/dL 7.3* 8.2* 7.9*   PLATELETS 10*3/mm3 94* 91* 73*     Results from last 7 days   Lab Units 12/04/20  0316 12/03/20  0408 12/02/20  1726   SODIUM mmol/L 133* 137 136   POTASSIUM mmol/L 5.1 4.9 5.6*   BUN mg/dL 46* 49* 58*   CREATININE mg/dL 0.52* 0.52* 0.64     Results from last 7 days   Lab Units 12/04/20  0446 11/30/20  0334 11/30/20  0005   PH, ARTERIAL pH units 7.376 7.326*  7.224*   PCO2, ARTERIAL mm Hg 49.4* 52.9* 67.0*   PO2 ART mm Hg 55.8* 189.0* 77.3*   FIO2 % 70 100 100     Blood Culture   Date Value Ref Range Status   11/29/2020 No growth at less than 24 hours  Preliminary   11/29/2020 No growth at less than 24 hours  Preliminary     Recent films:  No radiology results for the last day  Films reviewed personally by me.  My interpretation: no new imaging   Pulmonary Assessment:  1. Acute respiratory failure with hypoxia due to Covid-19  2. Atrial fibrillation, new onset  3. Leukocytosis  4. Hypothyroidism  5. Advanced age  6. Thrombocytopenia  7. Hyperkalemia   8. Anemia   9. +BC gram-positive cocci in pairs and chains    Recommend:   · Day #6 ETT of 2nd intubation.  Continue mechanical ventilation.  No changes today. Titrate FiO2 for sat 90-94%  · Continue bronchodilators with Atrovent and Symbicort. Avoid albuterol due to risk of tachycardia.  · Candidate for prone positioning: no  · Remdesivir, dexamethasone completed  · Dexamethasone resumed 11/29/20 for worsening respiratory status requiring re-intubation, will titrate as tolerated.  Decreased to 4 mg IV daily 12/01/20, and we will decrease her of little further today.  · Other antibiotics per ID  · DVT prophylaxis-Lovenox on hold   · Stress ulcer prophylaxis - protonix   · Continue zinc, vitamin C, melatonin, vitamin D  · Nutrition per Dobbhoff tube -continue as tolerated  · Patients prognosis is extraordinarily poor with no progress over the past several days, probably some retroaggression.  Continue supportive care.  Family continues to wish for aggressive therapy    Electronically signed by Newton Woodard MD on 12/4/2020 at 12:06 CST

## 2020-12-04 NOTE — PROGRESS NOTES
Continued Stay Note   [place]     Patient Name: Rashard Mobley  MRN: [id number]  Today's Date: 12/4/2020    Admit Date: 11/14/2020    Discharge Plan     Row Name 12/04/20 1138       Plan    Plan  unclear/LTAC?    Plan Comments  Patient remains in ICU, on vent, full code.  Recommend LTAC referral if aggressive care continues.        Discharge Codes    No documentation.             JAILENE Velazquez

## 2020-12-05 NOTE — PROGRESS NOTES
Chart reviewed and Palliative medicine SW contacted her  by phone. He is hoping to see her by video call this evening and remains hopeful that she will recover from this. He denied any desire to change course at this time. We will follow-up on Monday.     Brien Perez, DO  Palliative Medicine

## 2020-12-05 NOTE — PROGRESS NOTES
[facility] Medicine Services  INPATIENT PROGRESS NOTE    Patient Name: Rashard Mobley  Date of Admission: 11/14/2020  Today's Date: 12/05/20  Length of Stay: 21  Primary Care Physician: Jimy Montano MD    Subjective   Chief Complaint: Shortness of breath  HPI   Remains intubated and sedated, tolerating NGF.    Review of Systems   Unable to perform ROS: Intubated     Objective    Temp:  [92.7 °F (33.7 °C)-97.2 °F (36.2 °C)] 93.3 °F (34.1 °C)  Heart Rate:  [] 84  Resp:  [20] 20  BP: ()/(50-75) 107/65  FiO2 (%):  [70 %] 70 %  Physical Exam  Constitutional:       Appearance: She is obese. She is ill-appearing.      Interventions: She is sedated and intubated.   HENT:      Right Ear: External ear normal.      Left Ear: External ear normal.      Mouth/Throat:      Mouth: Mucous membranes are dry.      Pharynx: Oropharynx is clear.   Eyes:      General: No scleral icterus.     Conjunctiva/sclera: Conjunctivae normal.   Neck:      Musculoskeletal: Neck supple.   Cardiovascular:      Rate and Rhythm: Normal rate and regular rhythm.      Heart sounds: Normal heart sounds.   Pulmonary:      Effort: She is intubated.      Breath sounds: Decreased breath sounds present bilaterally.   Abdominal:      General: Abdomen is flat. Bowel sounds are normal.      Palpations: Abdomen is soft. There is no mass.   Musculoskeletal:         General: Swelling (LUE) present. No deformity.   Lymphadenopathy:      Cervical: No cervical adenopathy.   Skin:     General: Skin is warm and dry.      Findings: Bruising (LUE and forearm accompanied by significant edema.)     Results Review:  I have reviewed the labs, radiology results, and diagnostic studies.    Laboratory Data:   Results from last 7 days   Lab Units 12/04/20  0316 12/03/20  0408 12/02/20  1726   WBC 10*3/mm3 15.01* 17.45* 13.61*   HEMOGLOBIN g/dL 7.3* 8.2* 7.9*   HEMATOCRIT % 23.4* 25.4* 25.4*   PLATELETS 10*3/mm3 94* 91* 73*            Results from last 7 days   Lab Units 12/04/20  0316 12/03/20  0408 12/02/20  1726 12/02/20  0413   SODIUM mmol/L 133* 137 136 135*   POTASSIUM mmol/L 5.1 4.9 5.6* 5.6*   CHLORIDE mmol/L 98 101 101 101   CO2 mmol/L 27.0 29.0 29.0 30.0*   BUN mg/dL 46* 49* 58* 58*   CREATININE mg/dL 0.52* 0.52* 0.64 0.67   CALCIUM mg/dL 8.9 9.1 9.0 9.2   BILIRUBIN mg/dL 0.6 0.5  --  0.4   ALK PHOS U/L 74 91  --  101   ALT (SGPT) U/L 22 32  --  33   AST (SGOT) U/L 18 21  --  21   GLUCOSE mg/dL 135* 65 174* 123*       Culture Data:   Blood Culture   Date Value Ref Range Status   12/01/2020 Streptococcus anginosus (C)  Preliminary   12/01/2020 No growth at 4 days  Preliminary   11/29/2020 No growth at 5 days  Final   11/29/2020 No growth at 5 days  Final       Radiology Data:   Imaging Results (Last 24 Hours)     ** No results found for the last 24 hours. **          I have reviewed the patient's current medications.     Assessment/Plan     Active Hospital Problems    Diagnosis   • **Pneumonia due to COVID-19 virus   • Hyperkalemia   • Thrombocytopenia (CMS/HCC)   • Anemia   • Acute respiratory failure with hypoxia (CMS/HCC)   • Hypernatremia   • Leukocytosis   • Advanced age   • Atrial fibrillation with RVR (CMS/HCC)   • Diabetes (CMS/HCC)   • Essential hypertension       Dexamethasone 2 mg IVP daily  Azactam/Clindamycin IV  ID input noted    Sedation > Versed drip/Precedex/Fentanyl drip  Invasive ventilatory support  Ventilator management per pulmonology    Nutrition > NGF    Continue adjunctive treatments    Dulcolax suppository  Lactulose 20 g per G-tube as needed  Reglan to 10 mg IV every 6 hours    Restrains non violent as needed    DVT prophylaxis > SCD. Lovenox off due to upper airway bleeding    Discharge Planning: I expect the patient to be discharged to ?[facility] or hospice in tbd number of days.    Electronically signed by Mele Cabrera MD, 12/05/20, 13:20 CST.

## 2020-12-05 NOTE — PROGRESS NOTES
[facility] Medicine Services  INPATIENT PROGRESS NOTE    Length of Stay: 20  Date of Admission: 11/14/2020  Primary Care Physician: Jimy Montano MD    Subjective     Chief Complaint:     Respiratory failure secondary cover pneumonia    HPI     The patient remains intubated and sedated on mechanical ventilation with Precedex, Versed and fentanyl drips.  The patient has not had a BM since 11/30 and will be given a Dulcolax suppository.  She will also be given 1 dose of lactulose, 20 g per G-tube.  Reglan 5 mg IV every 6 hours has not yet been successful in reestablishing bowel function.  Bowel sounds are hypoactive.  C-reactive protein has increased to 13.3 from 9.982 days prior.  Ferritin is slightly decreased at 1096.  Vital signs are stable.  The patient is intermittently tachycardic.  She is afebrile and has been so for the past 48 hours.  Ventilator requirements are 70% FiO2 with 10 of PEEP.    Review of Systems     All pertinent negatives and positives are as above. All other systems have been reviewed and are negative unless otherwise stated.     Objective    Temp:  [93.2 °F (34 °C)-97 °F (36.1 °C)] 93.2 °F (34 °C)  Heart Rate:  [] 82  Resp:  [18-22] 20  BP: ()/(45-72) 116/67  FiO2 (%):  [60 %-70 %] 70 %    Lab Results (last 24 hours)     Procedure Component Value Units Date/Time    POC Glucose Once [[id number]]  (Abnormal) Collected: 12/04/20 1806    Specimen: Blood Updated: 12/04/20 1817     Glucose 234 mg/dL      Comment: : [id number] Lewis MujicaWing ID: [id number]       Blood Culture - Blood, Arm, Right [[id number]]  (Abnormal)  (Susceptibility) Collected: 12/01/20 0844    Specimen: Blood from Arm, Right Updated: 12/04/20 1115     Blood Culture Streptococcus anginosus     Isolated from Aerobic Bottle     Gram Stain Aerobic Bottle Gram positive cocci in pairs and chains    Susceptibility      Streptococcus anginosus     CARMEN     Ceftriaxone  Susceptible     Penicillin G Susceptible     Vancomycin Susceptible                    POC Glucose Once [[id number]]  (Abnormal) Collected: 12/04/20 1044    Specimen: Blood Updated: 12/04/20 1112     Glucose 221 mg/dL      Comment: : [id number] Lewis MujicaMeter ID: [id number]       Blood Culture With LUIS - Blood, Hand, Right [[id number]] Collected: 11/29/20 0937    Specimen: Blood from Hand, Right Updated: 12/04/20 1045     Blood Culture No growth at 5 days    Blood Culture With LUIS - Blood, Arm, Left [[id number]] Collected: 11/29/20 0942    Specimen: Blood from Arm, Left Updated: 12/04/20 1045     Blood Culture No growth at 5 days    Blood Culture - Blood, Arm, Left [[id number]] Collected: 12/01/20 0844    Specimen: Blood from Arm, Left Updated: 12/04/20 0915     Blood Culture No growth at 3 days    POC Glucose Once [[id number]]  (Abnormal) Collected: 12/04/20 0604    Specimen: Blood Updated: 12/04/20 0617     Glucose 134 mg/dL      Comment: : [id number] Nba HortonherMeter ID: [id number]       Manual Differential [[id number]]  (Abnormal) Collected: 12/04/20 0316    Specimen: Blood Updated: 12/04/20 0458     Neutrophil % 94.0 %      Lymphocyte % 0.0 %      Monocyte % 1.0 %      Bands %  4.0 %      Atypical Lymphocyte % 1.0 %      Neutrophils Absolute 14.71 10*3/mm3      Lymphocytes Absolute 0.00 10*3/mm3      Monocytes Absolute 0.15 10*3/mm3      Anisocytosis Slight/1+     Basophilic Stippling Slight/1+     Elliptocytes Mod/2+     Hypochromia Slight/1+     Microcytes Slight/1+     Ovalocytes Slight/1+     Poikilocytes Mod/2+     Polychromasia Mod/2+     WBC Morphology Normal     Platelet Estimate Decreased    Comprehensive Metabolic Panel [[id number]]  (Abnormal) Collected: 12/04/20 0316    Specimen: Blood Updated: 12/04/20 0453     Glucose 135 mg/dL      BUN 46 mg/dL      Creatinine 0.52 mg/dL      Sodium 133 mmol/L      Potassium 5.1 mmol/L      Chloride 98 mmol/L      CO2 27.0 mmol/L      Calcium 8.9 mg/dL       Total Protein 4.8 g/dL      Albumin 2.50 g/dL      ALT (SGPT) 22 U/L      AST (SGOT) 18 U/L      Alkaline Phosphatase 74 U/L      Total Bilirubin 0.6 mg/dL      eGFR Non African Amer 110 mL/min/1.73      Globulin 2.3 gm/dL      A/G Ratio 1.1 g/dL      BUN/Creatinine Ratio 88.5     Anion Gap 8.0 mmol/L     Narrative:      GFR Normal >60  Chronic Kidney Disease <60  Kidney Failure <15      C-reactive Protein [[id number]]  (Abnormal) Collected: 12/04/20 0316    Specimen: Blood Updated: 12/04/20 0453     C-Reactive Protein 13.32 mg/dL     Ferritin [[id number]]  (Abnormal) Collected: 12/04/20 0316    Specimen: Blood Updated: 12/04/20 0453     Ferritin 1,096.00 ng/mL     Narrative:      Results may be falsely decreased if patient taking Biotin.      Blood Gas, Arterial - [[id number]]  (Abnormal) Collected: 12/04/20 0446    Specimen: Arterial Blood Updated: 12/04/20 0453     Site Right Radial     Fran's Test Positive     pH, Arterial 7.376 pH units      pCO2, Arterial 49.4 mm Hg      Comment: 83 Value above reference range        pO2, Arterial 55.8 mm Hg      Comment: 84 Value below reference range        HCO3, Arterial 28.9 mmol/L      Comment: 83 Value above reference range        Base Excess, Arterial 3.3 mmol/L      Comment: 83 Value above reference range        O2 Saturation, Arterial 89.9 %      Comment: 84 Value below reference range        Temperature 37.0 C      Barometric Pressure for Blood Gas 752 mmHg      Modality Ventilator     FIO2 70 %      Ventilator Mode AC     Set Tidal Volume 500     Set [facility] 20.[facility]     PEEP 10.0     Collected by [id number]     Comment: Meter: [id number]     :  [id number]        pCO2, Temperature Corrected 49.4 mm Hg      pH, Temp Corrected 7.376 pH Units      pO2, Temperature Corrected 55.8 mm Hg     CBC & Differential [[id number]]  (Abnormal) Collected: 12/04/20 0316    Specimen: Blood Updated: 12/04/20 0449    Narrative:      The following orders were created for  panel order CBC & Differential.  Procedure                               Abnormality         Status                     ---------                               -----------         ------                     CBC Auto Differential[[id number]]        Abnormal            Final result                 Please view results for these tests on the individual orders.    CBC Auto Differential [[id number]]  (Abnormal) Collected: 12/04/20 0316    Specimen: Blood Updated: 12/04/20 0449     WBC 15.01 10*3/mm3      RBC 2.41 10*6/mm3      Hemoglobin 7.3 g/dL      Hematocrit 23.4 %      MCV 97.1 fL      MCH 30.3 pg      MCHC 31.2 g/dL      RDW 19.9 %      RDW-SD 69.7 fl      Platelets 94 10*3/mm3     POC Glucose Once [[id number]]  (Normal) Collected: 12/04/20 0007    Specimen: Blood Updated: 12/04/20 0017     Glucose 128 mg/dL      Comment: : [id number] Nba HeatherMeter ID: [id number]             Imaging Results (Last 24 Hours)     ** No results found for the last 24 hours. **             Intake/Output Summary (Last 24 hours) at 12/4/2020 1834  Last data filed at 12/4/2020 1700  Gross per 24 hour   Intake 2616.08 ml   Output 575 ml   Net 2041.08 ml       Physical Exam  Constitutional:       Appearance: She is obese. She is ill-appearing.      Interventions: She is sedated and intubated.   HENT:      Right Ear: External ear normal.      Left Ear: External ear normal.      Mouth/Throat:      Mouth: Mucous membranes are dry.      Pharynx: Oropharynx is clear.   Eyes:      General: No scleral icterus.     Conjunctiva/sclera: Conjunctivae normal.   Neck:      Musculoskeletal: Neck supple.   Cardiovascular:      Rate and Rhythm: Normal rate and regular rhythm.      Heart sounds: Normal heart sounds.   Pulmonary:      Effort: She is intubated.      Breath sounds: Decreased breath sounds present bilaterally.   Abdominal:      General: Abdomen is flat. Bowel sounds are normal.      Palpations: Abdomen is soft. There is no mass.      Musculoskeletal:         General: Swelling (LUE) present. No deformity.   Lymphadenopathy:      Cervical: No cervical adenopathy.   Skin:     General: Skin is warm and dry.      Findings: Bruising (LUE and forearm accompanied by significant edema.)     Results Review:  I have reviewed the labs, radiology results, and diagnostic studies since my last progress note and made treatment changes reflective of the results.   I have reviewed the current medications.    Assessment/Plan     Active Hospital Problems    Diagnosis   • **Pneumonia due to COVID-19 virus   • Hyperkalemia   • Thrombocytopenia (CMS/HCC)   • Anemia   • Acute respiratory failure with hypoxia (CMS/HCC)   • Hypernatremia   • Leukocytosis   • Advanced age   • Atrial fibrillation with RVR (CMS/HCC)   • Diabetes (CMS/HCC)   • Essential hypertension       PLAN:  Continue dexamethasone  Continue adjunctive treatments  Antibiotic management per infectious diseases  Ventilator management per pulmonology  Continue tube feeds  Dulcolax suppository  Lactulose 20 g per G-tube x1 dose  Increase Reglan to 10 mg IV every 6 hours    Electronically signed by Ramiro Prieto DO, 12/04/20, 18:34 CST.

## 2020-12-05 NOTE — PLAN OF CARE
Goal Outcome Evaluation:  Plan of Care Reviewed With: other (see comments)  Progress: declining   No changes in vent. Setting. She still has not had BM since 11-. Interventions ordered as appropriate.  TF rate decreased to 15 ml/hr dt to increased amount of residual. Right arm is weeping and edematous.    is still wanting to continue treatment despite prognosis.

## 2020-12-05 NOTE — PROGRESS NOTES
PULMONARY AND CRITICAL CARE PROGRESS NOTE - [facility]    Patient: Rashard Mobley    7/17/1928    MR# [id number]    Acct# [id number]  12/05/20   11:02 CST  Referring Provider: Mele Cabrera,*    Chief Complaint: Mechanically ventilated    Interval history: Pt remains mechanically ventilated due to SARS-CoV-19 infection.  Patient was evaluated through through glass door as the patient is in COVID-19 isolation in effort to preserve PPE and avoid unnecessary exposure.  The patient remains intubated and sedated on Versed, Precedex and Levophed.  Saturation 97 on a PEEP of 10 and FiO2 0.7.  Peak airway pressure 37.  She is passively breathing with the ventilator.  She does not have a fever.    Meds:  atorvastatin, 10 mg, Oral, Nightly  bisacodyl, 10 mg, Rectal, Daily  budesonide-formoterol, 2 puff, Inhalation, BID - RT  cholecalciferol, 2,000 Units, Oral, Daily  dexamethasone, 4 mg, Intravenous, Daily  dilTIAZem, 30 mg, Oral, Q6H  HYDROmorphone, 2 mg, Intravenous, Once  insulin detemir, 10 Units, Subcutaneous, Q12H  insulin lispro, 0-9 Units, Subcutaneous, Q6H  ipratropium, 2 puff, Inhalation, 4x Daily - RT  levothyroxine, 88 mcg, Oral, Q AM  Linezolid, 600 mg, Intravenous, Q12H  melatonin, 3 mg, Oral, Nightly  metoclopramide, 10 mg, Intravenous, Q6H  pantoprazole, 40 mg, Intravenous, BID  vitamin B-12, 1,000 mcg, Oral, Daily  vitamin C, 500 mg, Oral, BID  zinc sulfate, 220 mg, Oral, Daily      dexmedetomidine, 0.2-1.5 mcg/kg/hr  fentanyl 10 mcg/mL,  mcg/hr, Last Rate: 200 mcg/hr (12/04/20 1831)  midazolam, 1-10 mg/hr, Last Rate: 3 mg/hr (12/04/20 0618)  norepinephrine, 0.02-0.3 mcg/kg/min      Review of Systems:     Cannot obtain due to mechanical ventilation.  The patient notably is critically ill and connected to a ventilator.  As such patient cannot communicate and provide any history whatsoever, including any history of present illness or interval history since arrival or review of  systems. The interested reviewer may note this fact, as an attempt has been made at collecting and documenting these portions of the patient history, but this information is unobtainable despite attempted review and therefore cannot be documented at this time.     Ventilator Settings:        Resp Rate (Set): 20  Pressure Support (cm H2O): 0 cm H20  FiO2 (%): 70 %  PEEP/CPAP (cm H2O): 10 cm H20  Minute Ventilation (L/min) (Obs): 7.51 L/min  Resp Rate (Observed) Vent: 20  I:E Ratio (Set): 1:2.30  I:E Ratio (Obs): 1:2.3  PIP Observed (cm H2O): 31 cm H2O     Physical Exam:  Temp:  [92.7 °F (33.7 °C)-97.2 °F (36.2 °C)] 92.7 °F (33.7 °C)  Heart Rate:  [] 84  Resp:  [20] 20  BP: ()/(50-75) 107/65  FiO2 (%):  [70 %] 70 %    Intake/Output Summary (Last 24 hours) at 12/5/2020 1102  Last data filed at 12/5/2020 0900  Gross per 24 hour   Intake 2594 ml   Output 1000 ml   Net 1594 ml     SpO2 Percentage    12/05/20 0644 12/05/20 0900 12/05/20 1045   SpO2: 97% 98% 99%      GENERAL/CONSTITUTIONAL: no distress.  Pt is on vent  HEENT: atraumatic, normocephalic  NOSE: normal  NECK: jugular veins nondistended  CHEST: no paradox, no retractions.  No respiratory distress.   Mild accessory muscle use vent synchrony: Good with passive breathing and respiratory rate 20.  Saturation 92.  CARDIAC: Irregular rhythm  ABDOMEN: nondistended  : Deferred  EXTREMITIES: Mild edema.  NEURO:  sedated, mechanically ventilated  SKIN: no jaundice.  No rash   Results from last 7 days   Lab Units 12/04/20  0316 12/03/20  0408 12/02/20  1726   WBC 10*3/mm3 15.01* 17.45* 13.61*   HEMOGLOBIN g/dL 7.3* 8.2* 7.9*   PLATELETS 10*3/mm3 94* 91* 73*     Results from last 7 days   Lab Units 12/04/20  0316 12/03/20  0408 12/02/20  1726   SODIUM mmol/L 133* 137 136   POTASSIUM mmol/L 5.1 4.9 5.6*   BUN mg/dL 46* 49* 58*   CREATININE mg/dL 0.52* 0.52* 0.64     Results from last 7 days   Lab Units 12/04/20  0446 11/30/20  0334 11/30/20  0005   PH, ARTERIAL  pH units 7.376 7.326* 7.224*   PCO2, ARTERIAL mm Hg 49.4* 52.9* 67.0*   PO2 ART mm Hg 55.8* 189.0* 77.3*   FIO2 % 70 100 100     Blood Culture   Date Value Ref Range Status   11/29/2020 No growth at less than 24 hours  Preliminary   11/29/2020 No growth at less than 24 hours  Preliminary     Recent films:  No radiology results for the last day  Films reviewed personally by me.  My interpretation: no new imaging, 12-5-20    Pulmonary Assessment:    1. Acute respiratory failure with hypoxia due to Covid-19  2. Atrial fibrillation, new onset  3. Leukocytosis  4. Hypothyroidism  5. Advanced age  6. Thrombocytopenia  7. Hyperkalemia   8. Anemia   9. +BC gram-positive cocci in pairs and chains    Recommend:   · Day #7 ETT of 2nd intubation.  Continue mechanical ventilation.  No changes today. Titrate FiO2 for sat 90-94%, appears her FiO2 could be reduced today  · Continue bronchodilators with Atrovent and Symbicort. Avoid albuterol due to risk of tachycardia.  · Candidate for prone positioning: no  · Remdesivir, dexamethasone completed  · Dexamethasone resumed 11/29/20 for worsening respiratory status requiring re-intubation, will wean off if able   · Other antibiotics per ID  · DVT prophylaxis-Lovenox on hold   · Stress ulcer prophylaxis - protonix   · Continue zinc, vitamin C, melatonin, vitamin D  · Nutrition per Dobbhoff tube -continue as tolerated  · Patients prognosis is extraordinarily poor with no progress over the past several days, probably some retroaggression.  Continue supportive care.  Family continues to wish for aggressive therapy    Electronically signed by RUSSELL Cooper on 12/5/2020 at 11:02 CST     Physician substantive portion:  She continues to struggle, sedated mechanically ventilated on very high settings.  Exam shows her to have some mild accessory muscle use.  She is sedated mechanically ventilated.  There is no rash.  She is somewhat pale.  Prognosis continues to same very poor.   Continue aggressive care.  We will continue to trying to wean off of the dexamethasone over the next few days.    I have seen and examined patient personally, performing a face-to-face diagnostic evaluation with plan of care reviewed and developed with APRN and nursing staff. I have addended and/or modified the above history of present illness, physical examination, and assessment and plan to reflect my findings and impressions. Essential elements of the care plan were discussed with APRN above.  Agree with findings and assessment/plan as documented above.    Electronically signed by Newton Woodard MD, on 12/5/2020, 11:35 CST

## 2020-12-05 NOTE — PROGRESS NOTES
Infectious Diseases Progress Note    Patient:  Rashard Mobley  YOB: 1928  MRN: [id number]   Admit date: 11/14/2020   Admitting Physician: Mele Cabrera,*  Primary Care Physician: Jimy Montano MD    Chief Complaint/Interval History: Nurses gave her a very extensive bath this morning.  Yee placed last night due to some urinary retention with the pure wick.  She had low temperature this morning, but hemodynamically stable.  On low-dose Levophed primarily due to sedation.  Per discussion with nursing Line sites look okay.  Interval notes reviewed.  She has not had a bowel movement in a few days.  When the nurse gave her a suppository this morning, she could feel some hard stool.  She seems to be tolerating tube feeds better than she was the other day.    Intake/Output Summary (Last 24 hours) at 12/5/2020 1022  Last data filed at 12/5/2020 0900  Gross per 24 hour   Intake 2821 ml   Output 1000 ml   Net 1821 ml     Allergies:   Allergies   Allergen Reactions   • Levaquin [Levofloxacin] Rash   • Rocephin [Ceftriaxone] Rash   • Vancomycin Rash     Current Scheduled Medications:   atorvastatin, 10 mg, Oral, Nightly  bisacodyl, 10 mg, Rectal, Daily  budesonide-formoterol, 2 puff, Inhalation, BID - RT  cholecalciferol, 2,000 Units, Oral, Daily  dexamethasone, 4 mg, Intravenous, Daily  dilTIAZem, 30 mg, Oral, Q6H  HYDROmorphone, 2 mg, Intravenous, Once  insulin detemir, 10 Units, Subcutaneous, Q12H  insulin lispro, 0-9 Units, Subcutaneous, Q6H  ipratropium, 2 puff, Inhalation, 4x Daily - RT  levothyroxine, 88 mcg, Oral, Q AM  Linezolid, 600 mg, Intravenous, Q12H  melatonin, 3 mg, Oral, Nightly  metoclopramide, 10 mg, Intravenous, Q6H  pantoprazole, 40 mg, Intravenous, BID  vitamin B-12, 1,000 mcg, Oral, Daily  vitamin C, 500 mg, Oral, BID  zinc sulfate, 220 mg, Oral, Daily      Current PRN Medications:  •  acetaminophen **OR** acetaminophen  •  albuterol sulfate HFA  •  benzonatate  •   "dextromethorphan polistirex ER  •  dextrose  •  dextrose  •  glucagon (human recombinant)  •  HYDROcodone-acetaminophen  •  metoprolol tartrate  •  potassium chloride    Review of Systems unobtainable from patient due to sedation and mechanical ventilation.    Vital Signs:  /65   Pulse 75   Temp 92.7 °F (33.7 °C) (Axillary)   Resp 20   Ht 152.4 cm (60\")   Wt 72.2 kg (159 lb 2.8 oz)   SpO2 98%   BMI 31.09 kg/m²     Physical Exam  Vital signs - reviewed.  General comfortable appearing.  Sedated on mechanical ventilation.  Per discussion with nursing no crackles or wheezes  Line/IV site - No erythema, warmth, induration, or tenderness.  Abdomen with some mild distention, but not as much as previous  Extremities without significant edema    Lab Results:  CBC:   Results from last 7 days   Lab Units 12/04/20  0316 12/03/20  0408 12/02/20  1726 12/02/20  0413 12/01/20  0427 11/30/20  1052 11/30/20  0529  11/29/20  1419   WBC 10*3/mm3 15.01* 17.45* 13.61* 17.95* 24.19*  --  24.65*  --  20.19*   HEMOGLOBIN g/dL 7.3* 8.2* 7.9* 6.7* 7.3* 7.5* 7.8*   < > 8.5*   HEMATOCRIT % 23.4* 25.4* 25.4* 21.3* 22.2* 22.9* 24.1*   < > 25.9*   PLATELETS 10*3/mm3 94* 91* 73* 92* 95*  --  99*  --  75*    < > = values in this interval not displayed.     BMP:  Results from last 7 days   Lab Units 12/04/20  0316 12/03/20  0408 12/02/20  1726 12/02/20  0413 12/01/20  1457 12/01/20  0427 11/30/20  0529 11/29/20  1419   SODIUM mmol/L 133* 137 136 135* 133* 135* 135* 136   POTASSIUM mmol/L 5.1 4.9 5.6* 5.6* 6.1* 6.0* 5.4* 4.6   CHLORIDE mmol/L 98 101 101 101 100 101 100 101   CO2 mmol/L 27.0 29.0 29.0 30.0* 28.0 28.0 28.0 28.0   BUN mg/dL 46* 49* 58* 58* 60* 62* 57* 45*   CREATININE mg/dL 0.52* 0.52* 0.64 0.67 0.78 0.80 0.91 0.58   GLUCOSE mg/dL 135* 65 174* 123* 264* 220* 311* 274*   CALCIUM mg/dL 8.9 9.1 9.0 9.2 9.3 9.6 9.5 9.7*   ALT (SGPT) U/L 22 32  --  33  --  37* 40* 27     Culture Results:   Blood Culture   Date Value Ref Range " Status   12/01/2020 Streptococcus anginosus (C)  Final   12/01/2020 No growth at 4 days  Preliminary   11/29/2020 No growth at 5 days  Final   11/29/2020 No growth at 5 days  Final   Susceptibility     Streptococcus anginosus     CARMEN     Ceftriaxone 0.5 ug/ml Susceptible     Penicillin G <=0.06 ug/ml Susceptible     Vancomycin 1 ug/ml Susceptible    (Discussed with microbiology-her organism is clindamycin susceptible they are going to report clindamycin susceptibility and also try to add E test susceptibility for linezolid)    Radiology: None  Additional Studies Reviewed: None    Impression:   1.  COVID-19 infection/COVID-19 pneumonia  2.  Respiratory failure secondary to COVID-19  3.  Bacteremia secondary to Streptococcus anginosus  4.  Allergies listed to levofloxacin, ceftriaxone, and vancomycin    Recommendations:   She had some urinary retention and had Yee catheter placed  We will check urinalysis and urine culture  Going to add aztreonam back pending culture results  Linezolid should have been treating the Streptococcus anginosus, now that we have identification and susceptibility going to change to clindamycin as we have confirmed susceptibility to that antibiotic  Will continue to treat with clindamycin and aztreonam  Continue supportive care  Continue to follow    Pelon Campbell MD

## 2020-12-06 NOTE — PROGRESS NOTES
[facility]  INPATIENT PROGRESS NOTE    Patient Name: Rashard Mobley  Date of Admission: 11/14/2020  Today's Date: 12/06/20  Length of Stay: 22  Primary Care Physician: Jimy Montano MD    Subjective   Chief Complaint: Shortness of breath  HPI   Remains intubated and sedated. Appears weaker today with worse semblance.     Review of Systems   Unable to perform ROS: Intubated   Neurological: Positive for dizziness.     Objective    Temp:  [92.9 °F (33.8 °C)-98.4 °F (36.9 °C)] 98.4 °F (36.9 °C)  Heart Rate:  [] 86  Resp:  [18-27] 20  BP: ()/(44-70) 102/60  FiO2 (%):  [70 %] 70 %  Physical Exam  Constitutional:       Appearance: She is obese. She is ill-appearing.      Interventions: She is sedated and intubated.   HENT:      Right Ear: External ear normal.      Left Ear: External ear normal.      Mouth/Throat:      Mouth: Mucous membranes are dry.      Pharynx: Oropharynx is clear.   Eyes:      General: No scleral icterus.     Conjunctiva/sclera: Conjunctivae normal.   Neck:      Musculoskeletal: Neck supple.   Cardiovascular:      Rate and Rhythm: Normal rate and regular rhythm.      Heart sounds: Normal heart sounds.   Pulmonary:      Effort: She is intubated.      Breath sounds: Decreased breath sounds present bilaterally.   Abdominal:      General: Abdomen is flat. Bowel sounds are normal.      Palpations: Abdomen is soft. There is no mass.   Musculoskeletal:         General: Swelling (LUE) present. No deformity.   Lymphadenopathy:      Cervical: No cervical adenopathy.   Skin:     General: Skin is warm and dry.      Findings: Bruising (LUE and forearm accompanied by significant edema.)     Results Review:  I have reviewed the labs, radiology results, and diagnostic studies.    Laboratory Data:   Results from last 7 days   Lab Units 12/06/20  0435 12/04/20  0316 12/03/20  0408   WBC 10*3/mm3 15.79* 15.01* 17.45*   HEMOGLOBIN g/dL 7.0* 7.3*  8.2*   HEMATOCRIT % 22.4* 23.4* 25.4*   PLATELETS 10*3/mm3 104* 94* 91*        Results from last 7 days   Lab Units 12/06/20  0435 12/04/20  0316 12/03/20  0408   SODIUM mmol/L 133* 133* 137   POTASSIUM mmol/L 5.1 5.1 4.9   CHLORIDE mmol/L 101 98 101   CO2 mmol/L 28.0 27.0 29.0   BUN mg/dL 50* 46* 49*   CREATININE mg/dL 0.73 0.52* 0.52*   CALCIUM mg/dL 9.1 8.9 9.1   BILIRUBIN mg/dL 0.4 0.6 0.5   ALK PHOS U/L 77 74 91   ALT (SGPT) U/L 20 22 32   AST (SGOT) U/L 15 18 21   GLUCOSE mg/dL 75 135* 65       Culture Data:   Blood Culture   Date Value Ref Range Status   12/01/2020 Streptococcus anginosus (C)  Preliminary   12/01/2020 No growth at 4 days  Preliminary   11/29/2020 No growth at 5 days  Final   11/29/2020 No growth at 5 days  Final       Radiology Data:   Imaging Results (Last 24 Hours)     ** No results found for the last 24 hours. **          I have reviewed the patient's current medications.     Assessment/Plan     Active Hospital Problems    Diagnosis   • **Pneumonia due to COVID-19 virus   • Hyperkalemia   • Thrombocytopenia (CMS/HCC)   • Anemia   • Acute respiratory failure with hypoxia (CMS/HCC)   • Hypernatremia   • Leukocytosis   • Advanced age   • Atrial fibrillation with RVR (CMS/HCC)   • Diabetes (CMS/HCC)   • Essential hypertension       Dexamethasone 2 mg IVP daily  Azactam/Clindamycin IV  ID input noted    Sedation > Versed drip/Precedex/Fentanyl drip  Invasive ventilatory support  Ventilator management per pulmonology    Nutrition > NGF    Continue adjunctive treatments    Dulcolax suppository  Lactulose 20 g per G-tube as needed  Reglan to 10 mg IV every 6 hours    Restrains non violent as needed    Transfuse 1 unit of PRBC today for Hb 7 and hypotension    DVT prophylaxis > SCD. Lovenox off due to upper airway bleeding    Discharge Planning: I expect the patient to be discharged to ?[facility] or hospice in tbd number of days.    Electronically signed by Mele Cabrera MD, 12/06/20, 10:38  CST.

## 2020-12-06 NOTE — PROGRESS NOTES
PULMONARY AND CRITICAL CARE PROGRESS NOTE - [facility]    Patient: Rashard Mobley    7/17/1928    MR# [id number]    Acct# [id number]  12/06/20   11:51 CST  Referring Provider: Mele Cabrera,*    Chief Complaint: Mechanically ventilated    Interval history: Pt remains mechanically ventilated due to SARS-CoV-19 infection.  Patient was evaluated through through glass door as the patient is in COVID-19 isolation in effort to preserve PPE and avoid unnecessary exposure.  The patient remains intubated and sedated on Versed, fentanyl and Levophed.  She is receiving nutrition via tube feeds.  She is afebrile.  Saturation 96% on PEEP of 10 and FiO2 0.7.  End-tidal 30.  Have asked nursing to reduce FiO2 to 60 if able.  Peak airway pressure 37.  Versed, Precedex and Levophed.  Saturation 97 on a PEEP of 10 and FiO2 0.7.  Peak airway pressure 37.      Meds:  atorvastatin, 10 mg, Oral, Nightly  aztreonam, 2 g, Intravenous, Q12H  bisacodyl, 10 mg, Rectal, Daily  budesonide-formoterol, 2 puff, Inhalation, BID - RT  cholecalciferol, 2,000 Units, Oral, Daily  clindamycin, 900 mg, Intravenous, Q8H  dexamethasone, 2 mg, Intravenous, Daily  dilTIAZem, 30 mg, Oral, Q6H  insulin detemir, 10 Units, Subcutaneous, Q12H  insulin lispro, 0-9 Units, Subcutaneous, Q6H  ipratropium, 2 puff, Inhalation, 4x Daily - RT  levothyroxine, 88 mcg, Oral, Q AM  melatonin, 3 mg, Oral, Nightly  metoclopramide, 10 mg, Intravenous, Q6H  pantoprazole, 40 mg, Intravenous, BID  vitamin B-12, 1,000 mcg, Oral, Daily  vitamin C, 500 mg, Oral, BID  zinc sulfate, 220 mg, Oral, Daily      dexmedetomidine, 0.2-1.5 mcg/kg/hr, Last Rate: 0.75 mcg/kg/hr (12/06/20 0638)  fentanyl 10 mcg/mL,  mcg/hr, Last Rate: 200 mcg/hr (12/06/20 2472)  midazolam, 1-10 mg/hr, Last Rate: 3 mg/hr (12/06/20 1583)  norepinephrine, 0.02-0.3 mcg/kg/min, Last Rate: 0.06 mcg/kg/min (12/06/20 0132)      Review of Systems:     Cannot obtain due to mechanical  ventilation.  The patient notably is critically ill and connected to a ventilator.  As such patient cannot communicate and provide any history whatsoever, including any history of present illness or interval history since arrival or review of systems. The interested reviewer may note this fact, as an attempt has been made at collecting and documenting these portions of the patient history, but this information is unobtainable despite attempted review and therefore cannot be documented at this time.     Ventilator Settings:        Resp Rate (Set): 20  Pressure Support (cm H2O): 0 cm H20  FiO2 (%): 50 %  PEEP/CPAP (cm H2O): 10 cm H20  Minute Ventilation (L/min) (Obs): 10.2 L/min  Resp Rate (Observed) Vent: 20  I:E Ratio (Set): 1:2.30  I:E Ratio (Obs): 1:1.0  PIP Observed (cm H2O): 36 cm H2O     Physical Exam:  Temp:  [92.9 °F (33.8 °C)-98.4 °F (36.9 °C)] 98.4 °F (36.9 °C)  Heart Rate:  [] 83  Resp:  [18-27] 20  BP: ()/(44-70) 97/56  FiO2 (%):  [50 %-70 %] 50 %    Intake/Output Summary (Last 24 hours) at 12/6/2020 1151  Last data filed at 12/6/2020 0835  Gross per 24 hour   Intake 2459.74 ml   Output 1000 ml   Net 1459.74 ml     SpO2 Percentage    12/06/20 1030 12/06/20 1045 12/06/20 1048   SpO2: 97% 97% 96%      GENERAL/CONSTITUTIONAL: no distress.  Pt is on vent  HEENT: atraumatic, normocephalic  NOSE: normal  NECK: jugular veins nondistended  CHEST: no paradox, no retractions.  No respiratory distress.   Mild accessory muscle use vent synchrony  CARDIAC: Irregular rhythm  ABDOMEN: nondistended  : Deferred  EXTREMITIES: Mild edema.  NEURO:  sedated, mechanically ventilated  SKIN: no jaundice.  No rash   Results from last 7 days   Lab Units 12/06/20  0435 12/04/20  0316 12/03/20  0408   WBC 10*3/mm3 15.79* 15.01* 17.45*   HEMOGLOBIN g/dL 7.0* 7.3* 8.2*   PLATELETS 10*3/mm3 104* 94* 91*     Results from last 7 days   Lab Units 12/06/20  0435 12/04/20  0316 12/03/20  0408   SODIUM mmol/L 133* 133* 137      POTASSIUM mmol/L 5.1 5.1 4.9   BUN mg/dL 50* 46* 49*   CREATININE mg/dL 0.73 0.52* 0.52*     Results from last 7 days   Lab Units 12/04/20  0446 11/30/20  0334 11/30/20  0005   PH, ARTERIAL pH units 7.376 7.326* 7.224*   PCO2, ARTERIAL mm Hg 49.4* 52.9* 67.0*   PO2 ART mm Hg 55.8* 189.0* 77.3*   FIO2 % 70 100 100     Blood Culture   Date Value Ref Range Status   11/29/2020 No growth at less than 24 hours  Preliminary   11/29/2020 No growth at less than 24 hours  Preliminary     Recent films:  No radiology results for the last day  Films reviewed personally by me.  My interpretation: no new imaging, 12-6-20    Pulmonary Assessment:    1. Acute respiratory failure with hypoxia due to Covid-19  2. Atrial fibrillation, new onset  3. Leukocytosis  4. Hypothyroidism  5. Advanced age  6. Thrombocytopenia  7. Hyperkalemia   8. Anemia   9. +BC gram-positive cocci in pairs and chains    Recommend:     · Day #6 ETT of 2nd intubation.  Continue mechanical ventilation.  Wean FiO2 down to 60 if able, currently on 0.7  · Titrate FiO2 for sat greater than 90   · Continue bronchodilators with Atrovent and Symbicort. Avoid albuterol due to risk of tachycardia.  · Candidate for prone positioning: no  · Remdesivir course, dexamethasone 10-day course completed  · Dexamethasone resumed 11/29/20 for worsening respiratory status requiring re-intubation, currently on 2 mg, wean off in the next 48 hours    · Other antibiotics per ID  · DVT prophylaxis-Lovenox on hold   · Stress ulcer prophylaxis - protonix   · Continue zinc, vitamin C, melatonin, vitamin D  · Nutrition per Dobbhoff tube -continue as tolerated  · Patients prognosis is extraordinarily poor with no progress over the past several days, probably some retroaggression.  Continue supportive care.  Family continues to wish for aggressive therapy    Electronically signed by RUSSELL Cooper on 12/6/2020 at 11:51 CST     Physician substantive portion:  Patient remains on the  ventilator.  She is sedated and does not appear to be suffering too significantly.  She has some tachypnea some accessory muscle use.  She is not distressed today.  Abdomen is nondistended.  She is not jaundiced.  On supportive measures as above.  We will continue the dexamethasone today.  We will stop it after tomorrow's dose.    I have seen and examined patient personally, performing a face-to-face diagnostic evaluation with plan of care reviewed and developed with APRN and nursing staff. I have addended and/or modified the above history of present illness, physical examination, and assessment and plan to reflect my findings and impressions. Essential elements of the care plan were discussed with APRN above.  Agree with findings and assessment/plan as documented above.    Electronically signed by Newton Woodard MD, on 12/6/2020, 15:04 CST

## 2020-12-07 NOTE — PLAN OF CARE
Goal Outcome Evaluation:  Plan of Care Reviewed With: other (see comments)  Progress: declining  Outcome Summary: RD nutrition follow-up completed.  Pt remains on vent support and TF @ 15 ml/hr with 175 ml residuals.  Will continue to monitor TF tolerance and follow for further d/c needs

## 2020-12-07 NOTE — DISCHARGE SUMMARY
[facility] Medicine Services  DISCHARGE SUMMARY       Date of Admission: 11/14/2020  Date of Discharge:  12/7/2020  Primary Care Physician: Jimy Montano MD    Discharge Diagnoses:  Active Hospital Problems    Diagnosis   • **Pneumonia due to COVID-19 virus   • Hyperkalemia   • Thrombocytopenia (CMS/HCC)   • Anemia   • Acute respiratory failure with hypoxia (CMS/[facility])   • Hypernatremia   • Leukocytosis   • Advanced age   • Atrial fibrillation with RVR (CMS/HCC)   • Diabetes (CMS/[facility])   • Essential hypertension         Presenting Problem/History of Present Illness:  COVID-19 virus infection [U07.1]     Chief Complaint on Day of Discharge:   Respiratory failure    History of Present Illness on Day of Discharge:   The patient remains intubated and sedated on.  LTAC referral was requested but the patient's oxygen requirement was too high for them to meet.  I discussed the case fully with pulmonology.  They suggest that nothing further could be done for this patient.  Both of us had a discussion with the patient's daughter indicating that the patient was not capable of extubation and but not be so in the future.  She has had further respiratory decline and was on 100% FiO2 with 12 of PEEP today.  After discussion with the patient's daughter and father on the phone once again.  They have elected to transition with de-escalation of care and to terminal extubation.  Palliative care was notified and comfort measures orders were enacted.  The patient comfortably passed away at 1615 hrs.    Hospital Course  Patient is a 89 y.o. female presented with shortness of breath and Covid screen positive she was in atrial fibrillation on a Cardizem drip.  She was placed on sliding scale insulin.  Infectious diseases was consulted and did not recommend steroid treatment at the time of admission.  The patient and their family agreed to proceed with convalescent plasma on the day after admission.  " Oxygen requirement quickly escalated to high flow then to Vapotherm and onward to BiPAP.  CT angiogram was obtained but no evidence of pulmonary embolus noted.  Pulmonology was subsequently consulted and the patient continued with convalescent plasma, remdesivir and dexamethasone.  She was placed on Atrovent HFA and Pulmicort Flexhaler.  She continued on adjunctive treatments as well throughout her hospital stay.  The patient began to develop delirium while hospitalized on 11/19.  She was subsequently transferred to the intensive care unit on 11/17 requiring soft restraints and sedation.  The patient required central line placement on 11/19.  The family wished to continue with full care interventions \"hoping for a miracle\".  They were made aware that withdrawal of interventions in the future would likely need to be revisited.  The patient did poorly on BiPAP and was transitioned back and forth between BiPAP and Vapotherm at maximum oxygen administration levels.  The patient eventually proceeded to intubation on 11/29.  She was placed on fentanyl and Precedex drips.  She required Levophed for goal arterial pressures.  At the time, the patient's FiO2 was only 50%.  Her pulmonary condition continued to worsen throughout the remainder of her hospitalization with oxygen requirements increasing to 100% with 12 of PEEP.  LTAC referral was requested but the patient's oxygen requirement was too high for them to meet.  I discussed the case fully with pulmonology.  They suggest that nothing further could be done for this patient.  Both of us had a discussion with the patient's daughter indicating that the patient was not capable of extubation and but not be so in the future.  Patient developed thrombocytopenia from sepsis syndrome and was seen by oncology on 12/1.  She has had further respiratory decline and was on 100% FiO2 with 12 of PEEP today.  After discussion with the patient's daughter and father on the phone once " again.  They have elected to transition with de-escalation of care and to terminal extubation.  Palliative care was notified and comfort measures orders were enacted.  The patient comfortably passed away at 1615 hrs.    Procedures Performed:   Endotracheal intubation  Central line placement    Consults:   Pulmonology  Infectious diseases  Palliative care  Hematology    Pertinent Test Results:   Imaging Results (All)     Procedure Component Value Units Date/Time    US Venous Doppler Upper Extremity Left (duplex) [[id number]] Collected: 11/30/20 1452     Updated: 11/30/20 1456    Narrative:      History: Swelling       Impression:      Impression: There is no evidence of deep venous thrombosis of the left  upper extremity.     Comments: Left upper extremity venous duplex exam was performed using  color Doppler flow, Doppler wave form analysis, and grayscale imaging,  with and without compression. There is no evidence of deep venous  thrombosis of the subclavian vein.  This report was finalized on 11/30/2020 14:53 by Dr. Matheus Gusman MD.    XR Chest 1 View [[id number]] Collected: 11/30/20 0911     Updated: 11/30/20 0916    Narrative:      Frontal supine radiograph of the chest 11/30/2020 8:45 AM CST     History: Had to advance the tube to 21cm; U07.1-COVID-19;  J18.9-Pneumonia, unspecified organism; I48.91-Unspecified atrial  fibrillation; R09.02-Hypoxemia; Z74.09-Other reduced mobility;  U07.1-COVID-19; J96.01-Acute respiratory failure with hypoxia;  U07.1-COVID-19; J12.89-Other viral pneumonia     Comparison: Chest exam dated 11/29/2020      Findings:      Endotracheal tube is somewhat low, with tip projecting 2.5 cm above the  jesus. Bilateral perihilar and basilar patchy lung infiltrates are  stable. Lung volumes are stable. No dense consolidation is seen. No  pleural effusion or pneumothorax. Enteric tube is in good position.  Remnant left chest wall catheter. No acute bony abnormality.       Impression:       Impression:   1. ET tube is mildly low, with tip projecting only 2.5 cm above the  jesus.  2. Lung volumes and bilateral infiltrates are not significantly changed.        This report was finalized on 11/30/2020 09:13 by Dr Ede Olivo, .    XR Chest 1 View [[id number]] Collected: 11/30/20 0806     Updated: 11/30/20 0811    Narrative:      Exam: XR CHEST 1 VW-     Indication: Line placement, Covid pneumonia     Comparison: 11/29/2020 at 12:04 PM     Findings:     Left-sided CVL with tip overlying the LEFT brachiocephalic vein.  Endotracheal tube is 2.5 cm above the jesus. Enteric tube terminates  below the diaphragm out of the field of view. Cardiac silhouette is  stable. No significant change in bilateral interstitial and airspace  opacity. No pneumothorax. No acute osseous finding.       Impression:      Impression:     1.  CVL tip overlies the LEFT brachiocephalic vein.  2.  Endotracheal tube is 2.5 cm above the jesus.  3.  No change in bilateral interstitial and airspace opacity.  This report was finalized on 11/30/2020 08:08 by Dr. Gage Miller MD.    XR Chest 1 View [[id number]] Collected: 11/29/20 1231     Updated: 11/29/20 1235    Narrative:      EXAMINATION:  XR CHEST 1 VW-  11/29/2020 12:15 PM CST     HISTORY: Respiratory failure. U07.1-COVID-19; J18.9-Pneumonia,  unspecified organism; I48.91-Unspecified atrial fibrillation;  R09.02-Hypoxemia.     COMPARISON: 11/25/2020.     FINDINGS:  Bilateral infiltrates are not significantly changed. No  significant pleural effusion is seen. Heart size is upper limits of  normal. The thoracic aorta is atheromatous. An NG tube remains in place.       Impression:      Bilateral infiltrates with no significant change.        This report was finalized on 11/29/2020 12:32 by Dr. William Taylor MD.    XR Abdomen KUB [[id number]] Collected: 11/25/20 1042     Updated: 11/25/20 1045    Narrative:      EXAMINATION: KUB radiograph 11/25/2020     HISTORY: NG tube     FINDINGS:  KUB radiograph reveals an NG tube has been advanced with the  tip at the gastric outlet. The tube is well-positioned and ready for  use.  This report was finalized on 11/25/2020 10:42 by Dr. Desmond Wolfe MD.    XR Chest 1 View [[id number]] Collected: 11/25/20 1041     Updated: 11/25/20 1045    Narrative:      EXAMINATION: Chest 1 view 11/25/2020     HISTORY: Covid     FINDINGS: Today's exam is compared to previous study of 2 days earlier.  An NG tube remains in place. Persistent bilateral interstitial  infiltrates are present stable from the previous study. There is no  effusion or free air present.       Impression:      1.. Stable diffuse bilateral interstitial infiltrates.  2. No interval line changes.  This report was finalized on 11/25/2020 10:41 by Dr. Desmond Wolfe MD.    XR Chest 1 View [[id number]] Collected: 11/23/20 0859     Updated: 11/23/20 0905    Narrative:      EXAMINATION: Chest 1 view 11/23/2020     HISTORY: Hypoxemia. Covid pneumonia.     FINDINGS: Today's exam is compared to previous study of 9 days earlier.  An NG tube has been successfully advanced into the stomach with the tip  in the gastric outlet. There is worsening diffuse bilateral mixed  interstitial and alveolar infiltrates consistent with Covid pneumonia.  There is no effusion or free air demonstrated. No evidence of  pneumothorax.       Impression:      1.. Worsening bilateral pneumonia  2. NG tube successfully advanced with the tip at the gastric outlet.  This report was finalized on 11/23/2020 09:02 by Dr. Desmond Wolfe MD.    XR Abdomen KUB [[id number]] Collected: 11/23/20 0859     Updated: 11/23/20 0903    Narrative:      HISTORY: NG placement     KUB: Frontal view the of the abdomen performed.     Comparison 11/21/2020     FINDINGS: Nasogastric tube tip within the distal stomach. Nonspecific  nonobstructive bowel gas pattern. Multiple pelvic phleboliths with  diffuse vascular calcifications. Left convexity of the lumbar  curvature.       Impression:      1. Nasogastric tube tip in the distal stomach.  This report was finalized on 11/23/2020 09:00 by Dr. Ann Gramajo MD.    XR Abdomen KUB [[id number]] Collected: 11/21/20 1003     Updated: 11/21/20 1007    Narrative:      HISTORY: Dobbhoff placement     KUB: Frontal view lower chest and upper abdomen performed.     Dobbhoff tube is present within the midportion of the stomach.     Patchy bilateral pulmonary opacities identified.       Impression:      1. Dobbhoff tube in the mid stomach.  This report was finalized on 11/21/2020 10:03 by Dr. Ann Gramajo MD.    CT Angiogram Chest With & Without Contrast [[id number]] Collected: 11/17/20 2104     Updated: 11/17/20 2112    Narrative:      CT chest angiogram dated 11/17/2020 5:56 PM CST      HISTORY: Suspected pulmonary embolus.     COMPARISON: 7/27/2018..      DLP: 402 mGy cm. Automated exposure control was utilized to diminish  patient radiation dose.     TECHNIQUE: Helical tomographic images of the chest were obtained after  the administration of intravenous contrast following angiogram protocol.  Additionally, 3D MIP reconstructions in the coronal and sagittal planes  were provided.        FINDINGS:    The imaged portion of the base of the neck appears unremarkable.      There is adequate enhancement of the pulmonary arteries to evaluate for  central and segmental pulmonary emboli. There are no filling defects  within the main, lobar, segmental or visualized subsegmental pulmonary  arteries. The pulmonary vessels are within normal limits.      There is rather diffuse groundglass consolidation within the periphery  of both lungs with more confluent consolidation in the lower lobes.  Radiographic findings are quite typical for widespread diffuse Covid  pneumonia.. There is mild bronchial wall thickening. A tiny right  effusion is present..      There is cardiomegaly. Coronary calcifications are present.. There is no  pericardial  effusion. There is enlargement of the pulmonary arteries  suggesting pulmonary arterial hypertension. There is atheromatous  calcification of the thoracic aorta and proximal great vessels with no  evidence of dissection. The ascending thoracic aorta measures 3.5 cm in  transverse dimension.     No enlarged axillary or mediastinal lymph nodes are present.      Old rib fractures are present. No acute suspicious bony abnormalities  are demonstrated..      A small hiatal hernia is present. The adrenals are unremarkable..        Impression:      1. No evidence of pulmonary embolus.  2. Rather extensive groundglass consolidation within both lungs with  more confluent consolidation in the lower lobes. FINDINGS are quite  typical for moderate to severe Covid pneumonia. A tiny right effusion is  present.  3. Moderate cardiomegaly with coronary calcifications present. There is  enlargement of the pulmonary arteries suggesting pulmonary arterial  hypertension..        This report was finalized on 11/17/2020 21:09 by Dr. Desmond Wolfe MD.    XR Chest 1 View [[id number]] Collected: 11/14/20 1107     Updated: 11/14/20 1113    Narrative:      Frontal upright radiograph of the chest 11/14/2020 10:39 AM CST     HISTORY: Weakness     COMPARISON: Chest exam dated 5/25/2020.     FINDINGS:      Underlying chronic changes throughout the lungs. There is a small faint  infiltrate in the right lung base, superimposed over the posterior ninth  rib. Lungs are otherwise clear. No pleural effusion or pneumothorax. The  cardiomediastinal silhouette and pulmonary vascularity are within normal  limits. The osseous structures and surrounding soft tissues demonstrate  no acute abnormality. Underlying spinal scoliotic curvature.       Impression:      1. There is a small, faint infiltrate in the right lung base which seems  most likely a small inflammatory/infectious infiltrate. Lungs are  otherwise clear after accounting for the underlying  chronic changes.  This report was finalized on 11/14/2020 11:09 by Dr Ede Olivo, .        Lab Results (all)     Procedure Component Value Units Date/Time    POC Glucose Once [[id number]]  (Normal) Collected: 12/07/20 1149    Specimen: Blood Updated: 12/07/20 1151     Glucose 87 mg/dL      Comment: : JOSHUAEYDIPIKA Ibarra SummerMeter ID: [id number]       POC Glucose Once [[id number]]  (Normal) Collected: 12/07/20 1026    Specimen: Blood Updated: 12/07/20 1032     Glucose 93 mg/dL      Comment: : SMEYSTJANICE Evansystedvelasquez SummerMeter ID: [id number]       POC Glucose Once [[id number]]  (Abnormal) Collected: 12/07/20 0831    Specimen: Blood Updated: 12/07/20 0839     Glucose 175 mg/dL      Comment: : SMEYSTJANICE Evansystaleena SummerMeter ID: [id number]       POC Glucose Once [[id number]]  (Abnormal) Collected: 12/07/20 0759    Specimen: Blood Updated: 12/07/20 0819     Glucose 63 mg/dL      Comment: : SMEYSTJANICE Ibarra SummerMeter ID: [id number]       POC Glucose Once [[id number]]  (Abnormal) Collected: 12/07/20 0650    Specimen: Blood Updated: 12/07/20 0702     Glucose 167 mg/dL      Comment: : [id number] Nba HortonherMeter ID: [id number]       POC Glucose Once [[id number]]  (Abnormal) Collected: 12/07/20 0649    Specimen: Blood Updated: 12/07/20 0701     Glucose 354 mg/dL      Comment: : [id number] Nba HeatherMeter ID: [id number]       POC Glucose Once [[id number]]  (Abnormal) Collected: 12/07/20 0640    Specimen: Blood Updated: 12/07/20 0701     Glucose 51 mg/dL      Comment: : [id number] Nba HeatherMeter ID: [id number]       Blood Gas, Arterial - [[id number]]  (Abnormal) Collected: 12/07/20 0340    Specimen: Arterial Blood Updated: 12/07/20 0349     Site Right Radial     Fran's Test Positive     pH, Arterial 7.321 pH units      Comment: 84 Value below reference range        pCO2, Arterial 55.0 mm Hg      Comment: 83 Value above reference range        pO2, Arterial 83.6 mm Hg      HCO3,  Arterial 28.3 mmol/L      Comment: 83 Value above reference range        Base Excess, Arterial 1.7 mmol/L      O2 Saturation, Arterial 97.0 %      Temperature 37.0 C      Barometric Pressure for Blood Gas 750 mmHg      Modality Ventilator     FIO2 100 %      Ventilator Mode AC     Set Tidal Volume 500     Set Mech Resp Rate 20.0     PEEP 10.0     Collected by [id number]     Comment: Meter: [id number]     :  [id number]        pCO2, Temperature Corrected 55.0 mm Hg      pH, Temp Corrected 7.321 pH Units      pO2, Temperature Corrected 83.6 mm Hg     POC Glucose Once [[id number]]  (Normal) Collected: 12/07/20 0051    Specimen: Blood Updated: 12/07/20 0101     Glucose 72 mg/dL      Comment: : [id number] Nba HeatherMeter ID: [id number]       POC Glucose Once [[id number]]  (Normal) Collected: 12/06/20 1752    Specimen: Blood Updated: 12/06/20 1802     Glucose 112 mg/dL      Comment: : IZA Eaton RyanMeter ID: [id number]       POC Glucose Once [[id number]]  (Normal) Collected: 12/06/20 1221    Specimen: Blood Updated: 12/06/20 1233     Glucose 109 mg/dL      Comment: : IZA Chenbs RyanMeter ID: [id number]       Blood Culture - Blood, Arm, Left [[id number]] Collected: 12/01/20 0844    Specimen: Blood from Arm, Left Updated: 12/06/20 0915     Blood Culture No growth at 5 days    Blood Culture - Blood, Arm, Right [[id number]]  (Abnormal)  (Susceptibility) Collected: 12/01/20 0844    Specimen: Blood from Arm, Right Updated: 12/06/20 0556     Blood Culture Streptococcus anginosus     Isolated from Aerobic Bottle     Gram Stain Aerobic Bottle Gram positive cocci in pairs and chains    Susceptibility      Streptococcus anginosus     CARMEN Not Specified     Ceftriaxone Susceptible      Clindamycin Susceptible (C) [1]       Erythromycin Susceptible (C) [1]       Linezolid  Susceptible     Penicillin G Susceptible      Vancomycin Susceptible               [1]   Appended report. These results have been  appended to a previously final verified report.                 Manual Differential [[id number]]  (Abnormal) Collected: 12/06/20 0435    Specimen: Blood Updated: 12/06/20 0526     Neutrophil % 96.0 %      Lymphocyte % 0.0 %      Monocyte % 3.0 %      Atypical Lymphocyte % 1.0 %      Neutrophils Absolute 15.16 10*3/mm3      Lymphocytes Absolute 0.00 10*3/mm3      Monocytes Absolute 0.47 10*3/mm3      Anisocytosis Mod/2+     Basophilic Stippling Mod/2+     Dacrocytes Mod/2+     Elliptocytes Large/3+     Macrocytes Slight/1+     Ovalocytes Slight/1+     Poikilocytes Mod/2+     Polychromasia Mod/2+     Rouleaux Slight/1+     Toxic Granulation Slight/1+     Platelet Estimate Decreased    Ferritin [[id number]]  (Abnormal) Collected: 12/06/20 0435    Specimen: Blood Updated: 12/06/20 0523     Ferritin 1,132.00 ng/mL     Narrative:      Results may be falsely decreased if patient taking Biotin.      C-reactive Protein [[id number]]  (Abnormal) Collected: 12/06/20 0435    Specimen: Blood Updated: 12/06/20 0523     C-Reactive Protein 4.99 mg/dL     Comprehensive Metabolic Panel [[id number]]  (Abnormal) Collected: 12/06/20 0435    Specimen: Blood Updated: 12/06/20 0520     Glucose 75 mg/dL      BUN 50 mg/dL      Creatinine 0.73 mg/dL      Sodium 133 mmol/L      Potassium 5.1 mmol/L      Chloride 101 mmol/L      CO2 28.0 mmol/L      Calcium 9.1 mg/dL      Total Protein 4.6 g/dL      Albumin 2.30 g/dL      ALT (SGPT) 20 U/L      AST (SGOT) 15 U/L      Alkaline Phosphatase 77 U/L      Total Bilirubin 0.4 mg/dL      eGFR Non African Amer 75 mL/min/1.73      Globulin 2.3 gm/dL      A/G Ratio 1.0 g/dL      BUN/Creatinine Ratio 68.5     Anion Gap 4.0 mmol/L     Narrative:      GFR Normal >60  Chronic Kidney Disease <60  Kidney Failure <15      CBC & Differential [[id number]]  (Abnormal) Collected: 12/06/20 0435    Specimen: Blood Updated: 12/06/20 0506    Narrative:      The following orders were created for panel order CBC &  Differential.  Procedure                               Abnormality         Status                     ---------                               -----------         ------                     CBC Auto Differential[[id number]]        Abnormal            Final result                 Please view results for these tests on the individual orders.    CBC Auto Differential [[id number]]  (Abnormal) Collected: 12/06/20 0435    Specimen: Blood Updated: 12/06/20 0506     WBC 15.79 10*3/mm3      RBC 2.31 10*6/mm3      Hemoglobin 7.0 g/dL      Hematocrit 22.4 %      MCV 97.0 fL      MCH 30.3 pg      MCHC 31.3 g/dL      RDW 19.1 %      RDW-SD 65.4 fl      Platelets 104 10*3/mm3     POC Glucose Once [[id number]]  (Normal) Collected: 12/05/20 2319    Specimen: Blood Updated: 12/05/20 2330     Glucose 125 mg/dL      Comment: : [id number] Nba HeatherMeter ID: [id number]       POC Glucose Once [[id number]]  (Normal) Collected: 12/05/20 1835    Specimen: Blood Updated: 12/05/20 1846     Glucose 101 mg/dL      Comment: : [id number] Lionel CarrieMeter ID: [id number]       Urinalysis, Microscopic Only - Urine, Catheter [[id number]]  (Abnormal) Collected: 12/05/20 1138    Specimen: Urine, Catheter Updated: 12/05/20 1234     RBC, UA 3-5 /HPF      WBC, UA 0-2 /HPF      Bacteria, UA 4+ /HPF      Squamous Epithelial Cells, UA 0-2 /HPF      Hyaline Casts, UA 3-6 /LPF      Methodology Automated Microscopy    Urinalysis With Culture If Indicated - Urine, Catheter [[id number]]  (Abnormal) Collected: 12/05/20 1138    Specimen: Urine, Catheter Updated: 12/05/20 1233     Color, UA Yellow     Appearance, UA Clear     pH, UA 5.5     Specific Gravity, UA 1.015     Glucose, UA Negative     Ketones, UA Negative     Bilirubin, UA Negative     Blood, UA Negative     Protein, UA Negative     Leuk Esterase, UA Trace     Nitrite, UA Negative     Urobilinogen, UA 1.0 E.U./dL    POC Glucose Once [[id number]]  (Abnormal) Collected: 12/05/20 1158    Specimen:  Blood Updated: 12/05/20 1209     Glucose 154 mg/dL      Comment: : [id number] Lionel High ID: [id number]       POC Glucose Once [[id number]]  (Abnormal) Collected: 12/05/20 0600    Specimen: Blood Updated: 12/05/20 0611     Glucose 223 mg/dL      Comment: : [id number] Kasey BrittanyMeter ID: [id number]       POC Glucose Once [[id number]]  (Abnormal) Collected: 12/05/20 0042    Specimen: Blood Updated: 12/05/20 0053     Glucose 215 mg/dL      Comment: : [id number] Kasey BrittanyMeter ID: [id number]       POC Glucose Once [[id number]]  (Abnormal) Collected: 12/04/20 1806    Specimen: Blood Updated: 12/04/20 1817     Glucose 234 mg/dL      Comment: : [id number] Lewis ButlerellaMeter ID: [id number]       POC Glucose Once [[id number]]  (Abnormal) Collected: 12/04/20 1044    Specimen: Blood Updated: 12/04/20 1112     Glucose 221 mg/dL      Comment: : [id number] Lewis MarcellaMeter ID: [id number]       Blood Culture With LUIS - Blood, Hand, Right [[id number]] Collected: 11/29/20 0937    Specimen: Blood from Hand, Right Updated: 12/04/20 1045     Blood Culture No growth at 5 days    Blood Culture With LUIS - Blood, Arm, Left [[id number]] Collected: 11/29/20 0942    Specimen: Blood from Arm, Left Updated: 12/04/20 1045     Blood Culture No growth at 5 days    POC Glucose Once [[id number]]  (Abnormal) Collected: 12/04/20 0604    Specimen: Blood Updated: 12/04/20 0617     Glucose 134 mg/dL      Comment: : [id number] Nba ButcherMeter ID: [id number]       Manual Differential [[id number]]  (Abnormal) Collected: 12/04/20 0316    Specimen: Blood Updated: 12/04/20 0458     Neutrophil % 94.0 %      Lymphocyte % 0.0 %      Monocyte % 1.0 %      Bands %  4.0 %      Atypical Lymphocyte % 1.0 %      Neutrophils Absolute 14.71 10*3/mm3      Lymphocytes Absolute 0.00 10*3/mm3      Monocytes Absolute 0.15 10*3/mm3      Anisocytosis Slight/1+     Basophilic Stippling Slight/1+     Elliptocytes Mod/2+      Hypochromia Slight/1+     Microcytes Slight/1+     Ovalocytes Slight/1+     Poikilocytes Mod/2+     Polychromasia Mod/2+     WBC Morphology Normal     Platelet Estimate Decreased    Comprehensive Metabolic Panel [[id number]]  (Abnormal) Collected: 12/04/20 0316    Specimen: Blood Updated: 12/04/20 0453     Glucose 135 mg/dL      BUN 46 mg/dL      Creatinine 0.52 mg/dL      Sodium 133 mmol/L      Potassium 5.1 mmol/L      Chloride 98 mmol/L      CO2 27.0 mmol/L      Calcium 8.9 mg/dL      Total Protein 4.8 g/dL      Albumin 2.50 g/dL      ALT (SGPT) 22 U/L      AST (SGOT) 18 U/L      Alkaline Phosphatase 74 U/L      Total Bilirubin 0.6 mg/dL      eGFR Non African Amer 110 mL/min/1.73      Globulin 2.3 gm/dL      A/G Ratio 1.1 g/dL      BUN/Creatinine Ratio 88.5     Anion Gap 8.0 mmol/L     Narrative:      GFR Normal >60  Chronic Kidney Disease <60  Kidney Failure <15      C-reactive Protein [[id number]]  (Abnormal) Collected: 12/04/20 0316    Specimen: Blood Updated: 12/04/20 0453     C-Reactive Protein 13.32 mg/dL     Ferritin [[id number]]  (Abnormal) Collected: 12/04/20 0316    Specimen: Blood Updated: 12/04/20 0453     Ferritin 1,096.00 ng/mL     Narrative:      Results may be falsely decreased if patient taking Biotin.      Blood Gas, Arterial - [[id number]]  (Abnormal) Collected: 12/04/20 0446    Specimen: Arterial Blood Updated: 12/04/20 0453     Site Right Radial     Fran's Test Positive     pH, Arterial 7.376 pH units      pCO2, Arterial 49.4 mm Hg      Comment: 83 Value above reference range        pO2, Arterial 55.8 mm Hg      Comment: 84 Value below reference range        HCO3, Arterial 28.9 mmol/L      Comment: 83 Value above reference range        Base Excess, Arterial 3.3 mmol/L      Comment: 83 Value above reference range        O2 Saturation, Arterial 89.9 %      Comment: 84 Value below reference range        Temperature 37.0 C      Barometric Pressure for Blood Gas 752 mmHg      Modality Ventilator      FIO2 70 %      Ventilator Mode AC     Set Tidal Volume 500     Set [facility] Resp Rate 20.0     PEEP 10.0     Collected by [id number]     Comment: Meter: [id number]     :  [id number]        pCO2, Temperature Corrected 49.4 mm Hg      pH, Temp Corrected 7.376 pH Units      pO2, Temperature Corrected 55.8 mm Hg     CBC & Differential [[id number]]  (Abnormal) Collected: 12/04/20 0316    Specimen: Blood Updated: 12/04/20 0449    Narrative:      The following orders were created for panel order CBC & Differential.  Procedure                               Abnormality         Status                     ---------                               -----------         ------                     CBC Auto Differential[[id number]]        Abnormal            Final result                 Please view results for these tests on the individual orders.    CBC Auto Differential [[id number]]  (Abnormal) Collected: 12/04/20 0316    Specimen: Blood Updated: 12/04/20 0449     WBC 15.01 10*3/mm3      RBC 2.41 10*6/mm3      Hemoglobin 7.3 g/dL      Hematocrit 23.4 %      MCV 97.1 fL      MCH 30.3 pg      MCHC 31.2 g/dL      RDW 19.9 %      RDW-SD 69.7 fl      Platelets 94 10*3/mm3     POC Glucose Once [[id number]]  (Normal) Collected: 12/04/20 0007    Specimen: Blood Updated: 12/04/20 0017     Glucose 128 mg/dL      Comment: : [id number] Nba HeatherMeter ID: [id number]       POC Glucose Once [[id number]]  (Normal) Collected: 12/03/20 1800    Specimen: Blood Updated: 12/03/20 1811     Glucose 101 mg/dL      Comment: : IZA Eaton RyanMeter ID: [id number]       POC Glucose Once [[id number]]  (Normal) Collected: 12/03/20 1358    Specimen: Blood Updated: 12/03/20 1410     Glucose 75 mg/dL      Comment: : IZA Eaton RyanMeter ID: [id number]       POC Glucose Once [[id number]]  (Abnormal) Collected: 12/03/20 1358    Specimen: Blood Updated: 12/03/20 1410     Glucose 67 mg/dL      Comment: : IZA Eaton RyanMeter ID:  [id number]       CBC & Differential [[id number]]  (Abnormal) Collected: 12/03/20 0408    Specimen: Blood Updated: 12/03/20 0656    Narrative:      The following orders were created for panel order CBC & Differential.  Procedure                               Abnormality         Status                     ---------                               -----------         ------                     CBC Auto Differential[[id number]]        Abnormal            Final result                 Please view results for these tests on the individual orders.    CBC Auto Differential [[id number]]  (Abnormal) Collected: 12/03/20 0408    Specimen: Blood Updated: 12/03/20 0656     WBC 17.45 10*3/mm3      RBC 2.68 10*6/mm3      Hemoglobin 8.2 g/dL      Hematocrit 25.4 %      MCV 94.8 fL      MCH 30.6 pg      MCHC 32.3 g/dL      RDW 20.8 %      RDW-SD 70.5 fl      Platelets 91 10*3/mm3     Manual Differential [[id number]]  (Abnormal) Collected: 12/03/20 0408    Specimen: Blood Updated: 12/03/20 0656     Neutrophil % 84.0 %      Lymphocyte % 0.0 %      Monocyte % 5.0 %      Bands %  9.0 %      Metamyelocyte % 1.0 %      Myelocyte % 1.0 %      Neutrophils Absolute 16.23 10*3/mm3      Lymphocytes Absolute 0.00 10*3/mm3      Monocytes Absolute 0.87 10*3/mm3      Anisocytosis Slight/1+     Hypochromia Slight/1+     Poikilocytes Slight/1+     Polychromasia Slight/1+     WBC Morphology Normal     Platelet Estimate Decreased    POC Glucose Once [[id number]]  (Normal) Collected: 12/03/20 0632    Specimen: Blood Updated: 12/03/20 0646     Glucose 75 mg/dL      Comment: : [id number] Nba HeatherMeter ID: [id number]       Ferritin [[id number]]  (Abnormal) Collected: 12/03/20 0408    Specimen: Blood Updated: 12/03/20 0523     Ferritin 1,137.00 ng/mL     Narrative:      Results may be falsely decreased if patient taking Biotin.      Comprehensive Metabolic Panel [[id number]]  (Abnormal) Collected: 12/03/20 0408    Specimen: Blood Updated: 12/03/20 0520      Glucose 65 mg/dL      BUN 49 mg/dL      Creatinine 0.52 mg/dL      Sodium 137 mmol/L      Potassium 4.9 mmol/L      Chloride 101 mmol/L      CO2 29.0 mmol/L      Calcium 9.1 mg/dL      Total Protein 5.1 g/dL      Albumin 2.40 g/dL      ALT (SGPT) 32 U/L      AST (SGOT) 21 U/L      Alkaline Phosphatase 91 U/L      Total Bilirubin 0.5 mg/dL      eGFR Non African Amer 110 mL/min/1.73      Globulin 2.7 gm/dL      A/G Ratio 0.9 g/dL      BUN/Creatinine Ratio 94.2     Anion Gap 7.0 mmol/L     Narrative:      GFR Normal >60  Chronic Kidney Disease <60  Kidney Failure <15      CK [[id number]]  (Abnormal) Collected: 12/03/20 0408    Specimen: Blood Updated: 12/03/20 0520     Creatine Kinase 19 U/L     Lactate Dehydrogenase [[id number]]  (Abnormal) Collected: 12/03/20 0408    Specimen: Blood Updated: 12/03/20 0519      U/L     C-reactive Protein [[id number]]  (Abnormal) Collected: 12/03/20 0408    Specimen: Blood Updated: 12/03/20 0517     C-Reactive Protein 9.98 mg/dL     POC Glucose Once [[id number]]  (Normal) Collected: 12/03/20 0007    Specimen: Blood Updated: 12/03/20 0028     Glucose 113 mg/dL      Comment: : [id number] Nba HeatherMeter ID: [id number]       Basic Metabolic Panel [[id number]]  (Abnormal) Collected: 12/02/20 1726    Specimen: Blood Updated: 12/02/20 1836     Glucose 174 mg/dL      BUN 58 mg/dL      Creatinine 0.64 mg/dL      Sodium 136 mmol/L      Potassium 5.6 mmol/L      Chloride 101 mmol/L      CO2 29.0 mmol/L      Calcium 9.0 mg/dL      eGFR Non African Amer 87 mL/min/1.73      BUN/Creatinine Ratio 90.6     Anion Gap 6.0 mmol/L     Narrative:      GFR Normal >60  Chronic Kidney Disease <60  Kidney Failure <15      CBC (No Diff) [[id number]]  (Abnormal) Collected: 12/02/20 1726    Specimen: Blood Updated: 12/02/20 1828     WBC 13.61 10*3/mm3      RBC 2.65 10*6/mm3      Hemoglobin 7.9 g/dL      Hematocrit 25.4 %      MCV 95.8 fL      MCH 29.8 pg      MCHC 31.1 g/dL      RDW 20.6 %      RDW-SD  71.0 fl      Platelets 73 10*3/mm3     Lactate Dehydrogenase [[id number]]  (Abnormal) Collected: 12/01/20 0427    Specimen: Blood Updated: 12/01/20 0603      U/L      Comment: Specimen hemolyzed.  Results may be affected.       C-reactive Protein [[id number]]  (Abnormal) Collected: 12/01/20 0427    Specimen: Blood Updated: 12/01/20 0548     C-Reactive Protein 7.01 mg/dL     CK [[id number]]  (Abnormal) Collected: 12/01/20 0427    Specimen: Blood Updated: 12/01/20 0548     Creatine Kinase 18 U/L     Ferritin [[id number]]  (Abnormal) Collected: 12/01/20 0427    Specimen: Blood Updated: 12/01/20 0548     Ferritin 1,527.00 ng/mL     Narrative:      Results may be falsely decreased if patient taking Biotin.      Comprehensive Metabolic Panel [[id number]]  (Abnormal) Collected: 12/01/20 0427    Specimen: Blood Updated: 12/01/20 0548     Glucose 220 mg/dL      BUN 62 mg/dL      Creatinine 0.80 mg/dL      Sodium 135 mmol/L      Potassium 6.0 mmol/L      Comment: Slight hemolysis detected by analyzer. Results may be affected.        Chloride 101 mmol/L      CO2 28.0 mmol/L      Calcium 9.6 mg/dL      Total Protein 4.9 g/dL      Albumin 2.50 g/dL      ALT (SGPT) 37 U/L      AST (SGOT) 24 U/L      Alkaline Phosphatase 121 U/L      Total Bilirubin 0.5 mg/dL      eGFR Non African Amer 67 mL/min/1.73      Globulin 2.4 gm/dL      A/G Ratio 1.0 g/dL      BUN/Creatinine Ratio 77.5     Anion Gap 6.0 mmol/L     CBC Auto Differential [[id number]]  (Abnormal) Collected: 12/01/20 0427    Specimen: Blood Updated: 12/01/20 0548     WBC 24.19 10*3/mm3      RBC 2.26 10*6/mm3      Hemoglobin 7.3 g/dL      Hematocrit 22.2 %      MCV 98.2 fL      MCH 32.3 pg      MCHC 32.9 g/dL      RDW 15.4 %      RDW-SD 54.3 fl      Platelets 95 10*3/mm3      Neutrophil % 90.9 %      Lymphocyte % 1.2 %      Monocyte % 3.5 %      Eosinophil % 0.2 %      Basophil % 0.4 %      Immature Grans % 3.8 %      Neutrophils, Absolute 22.01 10*3/mm3      Lymphocytes,  Absolute 0.28 10*3/mm3      Monocytes, Absolute 0.84 10*3/mm3      Eosinophils, Absolute 0.05 10*3/mm3      Basophils, Absolute 0.09 10*3/mm3      Immature Grans, Absolute 0.92 10*3/mm3     Comprehensive Metabolic Panel [[id number]]  (Abnormal) Collected: 11/28/20 0000    Specimen: Blood Updated: 11/28/20 0036     Glucose 213 mg/dL      BUN 54 mg/dL      Creatinine 0.75 mg/dL      Sodium 138 mmol/L      Potassium 5.1 mmol/L      Chloride 103 mmol/L      CO2 25.0 mmol/L      Calcium 9.7 mg/dL      Total Protein 4.9 g/dL      Albumin 2.80 g/dL      ALT (SGPT) 30 U/L      AST (SGOT) 26 U/L      Alkaline Phosphatase 80 U/L      Total Bilirubin 0.8 mg/dL      eGFR Non African Amer 72 mL/min/1.73      Globulin 2.1 gm/dL      A/G Ratio 1.3 g/dL      BUN/Creatinine Ratio 72.0     Anion Gap 10.0 mmol/L     CBC & Differential [[id number]]  (Abnormal) Collected: 11/27/20 0303    Specimen: Blood Updated: 11/27/20 0523    Narrative:      The following orders were created for panel order CBC & Differential.  Procedure                               Abnormality         Status                     ---------                               -----------         ------                     CBC Auto Differential[[id number]]        Abnormal            Final result                 Please view results for these tests on the individual orders.    CBC Auto Differential [[id number]]  (Abnormal) Collected: 11/27/20 0303    Specimen: Blood Updated: 11/27/20 0523     WBC 24.96 10*3/mm3      RBC 3.54 10*6/mm3      Hemoglobin 11.4 g/dL      Hematocrit 34.1 %      MCV 96.3 fL      MCH 32.2 pg      MCHC 33.4 g/dL      RDW 14.9 %      RDW-SD 52.8 fl      Platelets 121 10*3/mm3     Manual Differential [[id number]]  (Abnormal) Collected: 11/27/20 0303    Specimen: Blood Updated: 11/27/20 0523     Neutrophil % 95.0 %      Lymphocyte % 1.0 %      Monocyte % 2.0 %      Eosinophil % 1.0 %      Bands %  1.0 %      Neutrophils Absolute 23.97 10*3/mm3      Lymphocytes  Absolute 0.25 10*3/mm3      Monocytes Absolute 0.50 10*3/mm3      Eosinophils Absolute 0.25 10*3/mm3      Acanthocytes Slight/1+     Anisocytosis Mod/2+     Elliptocytes Slight/1+     Macrocytes Mod/2+     Poikilocytes Mod/2+     Vacuolated Neutrophils Slight/1+     Platelet Morphology Normal    Ferritin [[id number]]  (Abnormal) Collected: 11/27/20 0303    Specimen: Blood Updated: 11/27/20 0427     Ferritin 2,248.00 ng/mL     Narrative:      Results may be falsely decreased if patient taking Biotin.      C-reactive Protein [[id number]]  (Abnormal) Collected: 11/27/20 0303    Specimen: Blood Updated: 11/27/20 0400     C-Reactive Protein 6.61 mg/dL     CK [[id number]]  (Normal) Collected: 11/27/20 0303    Specimen: Blood Updated: 11/27/20 0359     Creatine Kinase 50 U/L     Lactate Dehydrogenase [[id number]]  (Abnormal) Collected: 11/27/20 0303    Specimen: Blood Updated: 11/27/20 0359      U/L     Blood Culture With LUIS - Blood, Arm, Left [[id number]] Collected: 11/19/20 1622    Specimen: Blood from Arm, Left Updated: 11/24/20 1715     Blood Culture No growth at 5 days    POC Glucose Once [[id number]]  (Abnormal) Collected: 11/24/20 1229    Specimen: Blood Updated: 11/24/20 1240     Glucose 299 mg/dL      Comment: : [id number] Garrido AmyMeter ID: [id number]       Urinalysis, Microscopic Only - Urine, Catheter [[id number]]  (Abnormal) Collected: 11/24/20 0910    Specimen: Urine, Catheter Updated: 11/24/20 1012     RBC, UA 6-12 /HPF      WBC, UA 0-2 /HPF      Bacteria, UA Trace /HPF      Squamous Epithelial Cells, UA 0-2 /HPF      Methodology Automated Microscopy    Urinalysis With Microscopic If Indicated (No Culture) - Urine, Catheter [[id number]]  (Abnormal) Collected: 11/24/20 0910    Specimen: Urine, Catheter Updated: 11/24/20 0922     Color, UA Yellow     Appearance, UA Clear     pH, UA 5.5     Specific Gravity, UA >1.030     Glucose, UA >=1000 mg/dL (3+)     Ketones, UA Negative     Bilirubin, UA Negative      Blood, UA Trace     Protein, UA Trace     Leuk Esterase, UA Negative     Nitrite, UA Negative     Urobilinogen, UA 2.0 E.U./dL    POC Glucose Once [[id number]]  (Abnormal) Collected: 11/24/20 0555    Specimen: Blood Updated: 11/24/20 0628     Glucose 302 mg/dL      Comment: : [id number] Julio JoséMeter ID: [id number]       Basic Metabolic Panel [[id number]]  (Abnormal) Collected: 11/24/20 0500    Specimen: Blood Updated: 11/24/20 0557     Glucose 343 mg/dL      BUN 49 mg/dL      Creatinine 0.58 mg/dL      Sodium 139 mmol/L      Potassium 4.5 mmol/L      Chloride 107 mmol/L      CO2 25.0 mmol/L      Calcium 9.1 mg/dL      eGFR Non African Amer 97 mL/min/1.73      BUN/Creatinine Ratio 84.5     Anion Gap 7.0 mmol/L     Ferritin [[id number]]  (Abnormal) Collected: 11/22/20 0443    Specimen: Blood Updated: 11/22/20 0611     Ferritin 2,108.00 ng/mL     Narrative:      Results may be falsely decreased if patient taking Biotin.      CBC & Differential [[id number]]  (Abnormal) Collected: 11/22/20 0443    Specimen: Blood Updated: 11/22/20 0551    Narrative:      The following orders were created for panel order CBC & Differential.  Procedure                               Abnormality         Status                     ---------                               -----------         ------                     CBC Auto Differential[[id number]]        Abnormal            Final result                 Please view results for these tests on the individual orders.    CBC Auto Differential [[id number]]  (Abnormal) Collected: 11/22/20 0443    Specimen: Blood Updated: 11/22/20 0551     WBC 21.32 10*3/mm3      RBC 4.20 10*6/mm3      Hemoglobin 13.7 g/dL      Hematocrit 40.0 %      MCV 95.2 fL      MCH 32.6 pg      MCHC 34.3 g/dL      RDW 14.9 %      RDW-SD 51.9 fl      Platelets 219 10*3/mm3     Manual Differential [[id number]]  (Abnormal) Collected: 11/22/20 0443    Specimen: Blood Updated: 11/22/20 0551     Neutrophil % 94.0 %       Lymphocyte % 1.0 %      Monocyte % 3.0 %      Myelocyte % 2.0 %      Neutrophils Absolute 20.04 10*3/mm3      Lymphocytes Absolute 0.21 10*3/mm3      Monocytes Absolute 0.64 10*3/mm3      Acanthocytes Slight/1+     Anisocytosis Mod/2+     Elliptocytes Mod/2+     Macrocytes Mod/2+     Microcytes Slight/1+     Poikilocytes Slight/1+     WBC Morphology Normal     Giant Platelets Mod/2+    C-reactive Protein [[id number]]  (Abnormal) Collected: 11/22/20 0443    Specimen: Blood Updated: 11/22/20 0548     C-Reactive Protein 7.65 mg/dL     Comprehensive Metabolic Panel [[id number]]  (Abnormal) Collected: 11/22/20 0443    Specimen: Blood Updated: 11/22/20 0546     Glucose 237 mg/dL      BUN 57 mg/dL      Creatinine 0.82 mg/dL      Sodium 150 mmol/L      Potassium 3.5 mmol/L      Chloride 109 mmol/L      CO2 28.0 mmol/L      Calcium 9.3 mg/dL      Total Protein 6.0 g/dL      Albumin 3.30 g/dL      ALT (SGPT) 20 U/L      AST (SGOT) 15 U/L      Alkaline Phosphatase 63 U/L      Total Bilirubin 0.9 mg/dL      eGFR Non African Amer 65 mL/min/1.73      Globulin 2.7 gm/dL      A/G Ratio 1.2 g/dL      BUN/Creatinine Ratio 69.5     Anion Gap 13.0 mmol/L     Narrative:      GFR Normal >60  Chronic Kidney Disease <60  Kidney Failure <15      CK [[id number]]  (Normal) Collected: 11/22/20 0443    Specimen: Blood Updated: 11/22/20 0545     Creatine Kinase 73 U/L     Urinalysis, Microscopic Only - Urine, Clean Catch [[id number]]  (Abnormal) Collected: 11/20/20 0900    Specimen: Urine, Clean Catch Updated: 11/20/20 0939     RBC, UA 3-5 /HPF      WBC, UA 3-5 /HPF      Bacteria, UA Trace /HPF      Squamous Epithelial Cells, UA None Seen /HPF      Hyaline Casts, UA None Seen /LPF      Methodology Automated Microscopy    Urinalysis With Microscopic If Indicated (No Culture) - Urine, Clean Catch [[id number]]  (Abnormal) Collected: 11/20/20 0900    Specimen: Urine, Clean Catch Updated: 11/20/20 0939     Color, UA Yellow     Appearance, UA Clear     pH,  UA 6.0     Specific Gravity, UA 1.025     Glucose, UA Negative     Ketones, UA 40 mg/dL (2+)     Bilirubin, UA Negative     Blood, UA Trace     Protein, UA Trace     Leuk Esterase, UA Trace     Nitrite, UA Negative     Urobilinogen, UA 1.0 E.U./dL    POC Glucose Once [[id number]]  (Abnormal) Collected: 11/20/20 0842    Specimen: Blood Updated: 11/20/20 0903     Glucose 245 mg/dL      Comment: : [id number] Ryan StacyMeter ID: [id number]       Comprehensive Metabolic Panel [[id number]]  (Abnormal) Collected: 11/20/20 0500    Specimen: Blood Updated: 11/20/20 0626     Glucose 236 mg/dL      BUN 52 mg/dL      Creatinine 0.89 mg/dL      Sodium 142 mmol/L      Potassium 3.7 mmol/L      Comment: Slight hemolysis detected by analyzer. Results may be affected.        Chloride 105 mmol/L      CO2 23.0 mmol/L      Calcium 8.7 mg/dL      Total Protein 5.6 g/dL      Albumin 2.90 g/dL      ALT (SGPT) 21 U/L      AST (SGOT) 19 U/L      Comment: Slight hemolysis detected by analyzer. Results may be affected.        Alkaline Phosphatase 58 U/L      Total Bilirubin 0.7 mg/dL      eGFR Non African Amer 59 mL/min/1.73      Globulin 2.7 gm/dL      A/G Ratio 1.1 g/dL      BUN/Creatinine Ratio 58.4     Anion Gap 14.0 mmol/L     Narrative:      GFR Normal >60  Chronic Kidney Disease <60  Kidney Failure <15      C-reactive Protein [[id number]]  (Abnormal) Collected: 11/20/20 0500    Specimen: Blood Updated: 11/20/20 0557     C-Reactive Protein 11.27 mg/dL     Ferritin [[id number]]  (Abnormal) Collected: 11/20/20 0500    Specimen: Blood Updated: 11/20/20 0557     Ferritin 1,951.00 ng/mL     Narrative:      Results may be falsely decreased if patient taking Biotin.      CK [[id number]]  (Normal) Collected: 11/20/20 0500    Specimen: Blood Updated: 11/20/20 0557     Creatine Kinase 52 U/L     CBC & Differential [[id number]]  (Abnormal) Collected: 11/20/20 0500    Specimen: Blood Updated: 11/20/20 0525    Narrative:      The following orders were  created for panel order CBC & Differential.  Procedure                               Abnormality         Status                     ---------                               -----------         ------                     CBC Auto Differential[[id number]]        Abnormal            Final result                 Please view results for these tests on the individual orders.    CBC Auto Differential [[id number]]  (Abnormal) Collected: 11/20/20 0500    Specimen: Blood Updated: 11/20/20 0525     WBC 16.91 10*3/mm3      RBC 3.96 10*6/mm3      Hemoglobin 12.6 g/dL      Hematocrit 36.7 %      MCV 92.7 fL      MCH 31.8 pg      MCHC 34.3 g/dL      RDW 14.6 %      RDW-SD 50.0 fl      Platelets 156 10*3/mm3      Neutrophil % 89.3 %      Lymphocyte % 1.5 %      Monocyte % 5.9 %      Eosinophil % 0.0 %      Basophil % 0.5 %      Neutrophils, Absolute 15.09 10*3/mm3      Lymphocytes, Absolute 0.25 10*3/mm3      Monocytes, Absolute 1.00 10*3/mm3      Eosinophils, Absolute 0.00 10*3/mm3      Basophils, Absolute 0.09 10*3/mm3     Blood Gas, Arterial - [[id number]]  (Abnormal) Collected: 11/18/20 1425    Specimen: Arterial Blood Updated: 11/18/20 1431     Site Right Radial     Fran's Test Positive     pH, Arterial 7.493 pH units      Comment: 83 Value above reference range        pCO2, Arterial 29.1 mm Hg      Comment: 84 Value below reference range        pO2, Arterial 61.8 mm Hg      Comment: 84 Value below reference range        HCO3, Arterial 22.3 mmol/L      Base Excess, Arterial -0.1 mmol/L      Comment: 84 Value below reference range        O2 Saturation, Arterial 94.0 %      Temperature 37.0 C      Barometric Pressure for Blood Gas 761 mmHg      Modality Heated HFNC     FIO2 100 %      Flow Rate 30.0 lpm      Ventilator Mode NA     Collected by [id number]     Comment: Meter: [id number]     :  [id number]        pCO2, Temperature Corrected 29.1 mm Hg      pH, Temp Corrected 7.493 pH Units      pO2, Temperature Corrected 61.8  mm Hg     POC Glucose Once [[id number]]  (Abnormal) Collected: 11/18/20 1210    Specimen: Blood Updated: 11/18/20 1231     Glucose 177 mg/dL      Comment: : [id number] Trammell) ChelseaMeter ID: [id number]       POC Glucose Once [[id number]]  (Abnormal) Collected: 11/18/20 0859    Specimen: Blood Updated: 11/18/20 0910     Glucose 132 mg/dL      Comment: : Mckenna Perez (Robinson) ChelseaMeter ID: [id number]       Ferritin [[id number]]  (Abnormal) Collected: 11/18/20 0501    Specimen: Blood Updated: 11/18/20 0619     Ferritin 2,046.00 ng/mL     Narrative:      Results may be falsely decreased if patient taking Biotin.      Comprehensive Metabolic Panel [[id number]]  (Abnormal) Collected: 11/18/20 0501    Specimen: Blood Updated: 11/18/20 0557     Glucose 137 mg/dL      BUN 22 mg/dL      Creatinine 0.64 mg/dL      Sodium 137 mmol/L      Potassium 3.2 mmol/L      Chloride 96 mmol/L      CO2 24.0 mmol/L      Calcium 9.0 mg/dL      Total Protein 6.0 g/dL      Albumin 3.20 g/dL      ALT (SGPT) 26 U/L      AST (SGOT) 33 U/L      Alkaline Phosphatase 54 U/L      Total Bilirubin 0.9 mg/dL      eGFR Non African Amer 87 mL/min/1.73      Globulin 2.8 gm/dL      A/G Ratio 1.1 g/dL      BUN/Creatinine Ratio 34.4     Anion Gap 17.0 mmol/L     Narrative:      GFR Normal >60  Chronic Kidney Disease <60  Kidney Failure <15      C-reactive Protein [[id number]]  (Abnormal) Collected: 11/18/20 0501    Specimen: Blood Updated: 11/18/20 0557     C-Reactive Protein 14.89 mg/dL     CK [[id number]]  (Normal) Collected: 11/18/20 0501    Specimen: Blood Updated: 11/18/20 0557     Creatine Kinase 169 U/L     Bilirubin, Direct [[id number]]  (Abnormal) Collected: 11/18/20 0501    Specimen: Blood Updated: 11/18/20 0557     Bilirubin, Direct 0.4 mg/dL     CBC & Differential [[id number]]  (Abnormal) Collected: 11/18/20 0501    Specimen: Blood Updated: 11/18/20 0534    Narrative:      The following orders were created for panel order CBC &  Differential.  Procedure                               Abnormality         Status                     ---------                               -----------         ------                     CBC Auto Differential[[id number]]        Abnormal            Final result                 Please view results for these tests on the individual orders.    CBC Auto Differential [[id number]]  (Abnormal) Collected: 11/18/20 0501    Specimen: Blood Updated: 11/18/20 0534     WBC 21.91 10*3/mm3      RBC 4.04 10*6/mm3      Hemoglobin 12.9 g/dL      Hematocrit 39.2 %      MCV 97.0 fL      MCH 31.9 pg      MCHC 32.9 g/dL      RDW 14.5 %      RDW-SD 52.2 fl      Platelets 117 10*3/mm3      Neutrophil % 93.1 %      Lymphocyte % 1.6 %      Monocyte % 4.1 %      Eosinophil % 0.0 %      Basophil % 0.3 %      Immature Grans % 0.9 %      Neutrophils, Absolute 20.42 10*3/mm3      Lymphocytes, Absolute 0.34 10*3/mm3      Monocytes, Absolute 0.89 10*3/mm3      Eosinophils, Absolute 0.00 10*3/mm3      Basophils, Absolute 0.07 10*3/mm3      Immature Grans, Absolute 0.19 10*3/mm3      nRBC 0.0 /100 WBC     Ferritin [[id number]]  (Abnormal) Collected: 11/16/20 0634    Specimen: Blood Updated: 11/16/20 0829     Ferritin 2,432.00 ng/mL     Narrative:      Results may be falsely decreased if patient taking Biotin.      Comprehensive Metabolic Panel [[id number]]  (Abnormal) Collected: 11/16/20 0634    Specimen: Blood Updated: 11/16/20 0804     Glucose 184 mg/dL      BUN 28 mg/dL      Creatinine 0.78 mg/dL      Sodium 136 mmol/L      Potassium 3.8 mmol/L      Chloride 101 mmol/L      CO2 24.0 mmol/L      Calcium 8.7 mg/dL      Total Protein 5.9 g/dL      Albumin 3.80 g/dL      ALT (SGPT) 17 U/L      AST (SGOT) 32 U/L      Alkaline Phosphatase 51 U/L      Total Bilirubin 0.6 mg/dL      eGFR Non African Amer 69 mL/min/1.73      Globulin 2.1 gm/dL      A/G Ratio 1.8 g/dL      BUN/Creatinine Ratio 35.9     Anion Gap 11.0 mmol/L     Narrative:      GFR Normal  >60  Chronic Kidney Disease <60  Kidney Failure <15      C-reactive Protein [[id number]]  (Abnormal) Collected: 11/16/20 0634    Specimen: Blood Updated: 11/16/20 0804     C-Reactive Protein 8.09 mg/dL     CK [[id number]]  (Abnormal) Collected: 11/16/20 0634    Specimen: Blood Updated: 11/16/20 0804     Creatine Kinase 563 U/L     CBC & Differential [[id number]]  (Abnormal) Collected: 11/16/20 0634    Specimen: Blood Updated: 11/16/20 0739    Narrative:      The following orders were created for panel order CBC & Differential.  Procedure                               Abnormality         Status                     ---------                               -----------         ------                     CBC Auto Differential[[id number]]        Abnormal            Final result                 Please view results for these tests on the individual orders.    CBC Auto Differential [[id number]]  (Abnormal) Collected: 11/16/20 0634    Specimen: Blood Updated: 11/16/20 0739     WBC 11.26 10*3/mm3      RBC 3.78 10*6/mm3      Hemoglobin 12.3 g/dL      Hematocrit 36.9 %      MCV 97.6 fL      MCH 32.5 pg      MCHC 33.3 g/dL      RDW 14.6 %      RDW-SD 52.4 fl      MPV --     Comment: Unable to Calculate        Platelets 114 10*3/mm3      Neutrophil % 87.5 %      Lymphocyte % 3.5 %      Monocyte % 7.9 %      Eosinophil % 0.1 %      Basophil % 0.3 %      Immature Grans % 0.7 %      Neutrophils, Absolute 9.86 10*3/mm3      Lymphocytes, Absolute 0.39 10*3/mm3      Monocytes, Absolute 0.89 10*3/mm3      Eosinophils, Absolute 0.01 10*3/mm3      Basophils, Absolute 0.03 10*3/mm3      Immature Grans, Absolute 0.08 10*3/mm3      nRBC 0.0 /100 WBC     Legionella Antigen, Urine - Urine, Urine, Catheter In/Out [[id number]]  (Normal) Collected: 11/14/20 1124    Specimen: Urine, Catheter In/Out Updated: 11/14/20 2049     LEGIONELLA ANTIGEN, URINE Negative    S. Pneumo Ag Urine or CSF - Urine, Urine, Catheter In/Out [[id number]]  (Normal) Collected:  11/14/20 1124    Specimen: Urine, Catheter In/Out Updated: 11/14/20 2049     Strep Pneumo Ag Negative    Ferritin [[id number]]  (Abnormal) Collected: 11/14/20 1012    Specimen: Blood Updated: 11/14/20 1320     Ferritin 375.70 ng/mL     Narrative:      Results may be falsely decreased if patient taking Biotin.      Urinalysis With Culture If Indicated - Urine, Catheter In/Out [[id number]]  (Abnormal) Collected: 11/14/20 1124    Specimen: Urine, Catheter In/Out Updated: 11/14/20 1132     Color, UA Yellow     Appearance, UA Clear     pH, UA <=5.0     Specific Gravity, UA 1.012     Glucose, UA Negative     Ketones, UA Trace     Bilirubin, UA Negative     Blood, UA Negative     Protein, UA Trace     Leuk Esterase, UA Negative     Nitrite, UA Negative     Urobilinogen, UA 0.2 E.U./dL    Narrative:      Urine microscopic not indicated.    Procalcitonin [[id number]]  (Normal) Collected: 11/14/20 1012    Specimen: Blood Updated: 11/14/20 1053     Procalcitonin 0.16 ng/mL     Comprehensive Metabolic Panel [[id number]]  (Abnormal) Collected: 11/14/20 1012    Specimen: Blood Updated: 11/14/20 1052     Glucose 219 mg/dL      BUN 17 mg/dL      Creatinine 0.86 mg/dL      Sodium 133 mmol/L      Potassium 4.3 mmol/L      Comment: Specimen hemolyzed.  Results may be affected.        Chloride 99 mmol/L      CO2 20.0 mmol/L      Calcium 8.7 mg/dL      Total Protein 6.3 g/dL      Albumin 3.60 g/dL      ALT (SGPT) 15 U/L      Comment: Specimen hemolyzed.  Results may be affected.        AST (SGOT) 23 U/L      Comment: Specimen hemolyzed.  Results may be affected.        Alkaline Phosphatase 60 U/L      Total Bilirubin 1.0 mg/dL      eGFR Non African Amer 62 mL/min/1.73      Globulin 2.7 gm/dL      A/G Ratio 1.3 g/dL      BUN/Creatinine Ratio 19.8     Anion Gap 14.0 mmol/L     Blood Gas, Arterial - [[id number]]  (Abnormal) Collected: 11/14/20 1027    Specimen: Arterial Blood Updated: 11/14/20 1041     Site Left Radial     Fran's Test Positive      pH, Arterial 7.430 pH units      pCO2, Arterial 32.0 mm Hg      Comment: 84 Value below reference range        pO2, Arterial 60.4 mm Hg      Comment: 84 Value below reference range        HCO3, Arterial 21.2 mmol/L      Base Excess, Arterial -2.3 mmol/L      Comment: 84 Value below reference range        O2 Saturation, Arterial 93.3 %      Comment: 84 Value below reference range        Temperature 37.0 C      Barometric Pressure for Blood Gas 751 mmHg      Modality Room Air     Ventilator Mode NA     Collected by [id number]     Comment: Meter: [id number]     :  [id number]        pCO2, Temperature Corrected 32.0 mm Hg      pH, Temp Corrected 7.430 pH Units      pO2, Temperature Corrected 60.4 mm Hg     Lactic Acid, Plasma [[id number]]  (Normal) Collected: 11/14/20 1012    Specimen: Blood Updated: 11/14/20 1039     Lactate 1.4 mmol/L     Protime-INR [[id number]]  (Normal) Collected: 11/14/20 1012    Specimen: Blood Updated: 11/14/20 1038     Protime 13.1 Seconds      INR 1.03    aPTT [[id number]]  (Normal) Collected: 11/14/20 1012    Specimen: Blood Updated: 11/14/20 1038     PTT 34.0 seconds     D-dimer, Quantitative [[id number]]  (Normal) Collected: 11/14/20 1012    Specimen: Blood Updated: 11/14/20 1038     D-Dimer, Quantitative 0.41 mg/L (FEU)     CBC Auto Differential [[id number]]  (Abnormal) Collected: 11/14/20 1012    Specimen: Blood Updated: 11/14/20 1026     WBC 9.19 10*3/mm3      RBC 4.05 10*6/mm3      Hemoglobin 13.0 g/dL      Hematocrit 39.6 %      MCV 97.8 fL      MCH 32.1 pg      MCHC 32.8 g/dL      RDW 14.6 %      RDW-SD 52.9 fl      MPV 14.0 fL      Platelets 125 10*3/mm3      Neutrophil % 80.3 %      Lymphocyte % 6.0 %      Monocyte % 12.2 %      Eosinophil % 0.2 %      Basophil % 0.3 %      Neutrophils, Absolute 7.38 10*3/mm3      Lymphocytes, Absolute 0.55 10*3/mm3      Monocytes, Absolute 1.12 10*3/mm3      Eosinophils, Absolute 0.02 10*3/mm3      Basophils, Absolute 0.03 10*3/mm3      "Narrative:      ckd            Physical Exam on Discharge:  /53   Pulse 60   Temp 97.3 °F (36.3 °C)   Resp 20   Ht 152.4 cm (60\")   Wt 76 kg (167 lb 8.8 oz)   SpO2 95%   BMI 32.72 kg/m²   Physical Exam     Constitutional:       Appearance: She is obese. She is ill-appearing.      Interventions: She is sedated and intubated.   HENT:      Right Ear: External ear normal.      Left Ear: External ear normal.      Mouth/Throat:      Mouth: Mucous membranes are dry.      Pharynx: Oropharynx is clear.   Eyes:      General: No scleral icterus.     Conjunctiva/sclera: Conjunctivae normal.   Neck:      Musculoskeletal: Neck supple.   Cardiovascular:      Rate and Rhythm: Normal rate and regular rhythm.      Heart sounds: Normal heart sounds.   Pulmonary:      Effort: She is intubated.      Breath sounds: Decreased breath sounds present bilaterally.   Abdominal:      General: Abdomen is flat. Bowel sounds are normal.      Palpations: Abdomen is soft. There is no mass.   Musculoskeletal:         General: Swelling (LUE) present. No deformity.   Lymphadenopathy:      Cervical: No cervical adenopathy.   Skin:     General: Skin is warm and dry.      Findings: Bruising (LUE and forearm accompanied by significant edema.)       Discharge Disposition:      Discharge Medications:     Discharge Medications      ASK your doctor about these medications      Instructions Start Date   atorvastatin 10 MG tablet  Commonly known as: LIPITOR   10 mg, Oral, Daily      cholecalciferol 25 MCG (1000 UT) tablet  Commonly known as: VITAMIN D3   1 tablet, Oral, Daily      levothyroxine 88 MCG tablet  Commonly known as: SYNTHROID, LEVOTHROID  Ask about: Which instructions should I use?   88 mcg, Oral, Daily      metoprolol tartrate 25 MG tablet  Commonly known as: LOPRESSOR   25 mg, Oral, Daily      vitamin B-12 1000 MCG tablet  Commonly known as: CYANOCOBALAMIN   1,000 mcg, Oral, Daily              Electronically signed by Ramrio BELL " DO Conner, 12/07/20, 17:07 CST.    Time: Discharge over 30 min    Part of this note may be an electronic transcription/translation of spoken language to printed text using the Dragon Dictation system.

## 2020-12-07 NOTE — PROGRESS NOTES
PULMONARY AND CRITICAL CARE PROGRESS NOTE - [facility]    Patient: Rashard Mobley    7/17/1928    MR# [id number]    Acct# [id number]  12/07/20   10:03 CST  Referring Provider: Ramiro Prieto DO    Chief Complaint: Mechanically ventilated    Interval history: Pt remains mechanically ventilated due to SARS-CoV-19 infection.  Patient was evaluated through through glass door as the patient is in COVID-19 isolation in effort to preserve PPE and avoid unnecessary exposure.  The patient remains intubated and sedated on Versed, fentanyl and Levophed.  She is receiving nutrition via tube feeds.  She is afebrile.  Saturation 90% on PEEP of 10 and FiO2 0.85.  End-tidal 27. Hgb 7.0 (7.3 yesterday), Hct 22.4, Platelets 104. No overnight events. No CXR. ABGs stable.       Meds:  atorvastatin, 10 mg, Oral, Nightly  aztreonam, 2 g, Intravenous, Q12H  bisacodyl, 10 mg, Rectal, Daily  budesonide-formoterol, 2 puff, Inhalation, BID - RT  cholecalciferol, 2,000 Units, Oral, Daily  clindamycin, 900 mg, Intravenous, Q8H  dexamethasone, 2 mg, Intravenous, Daily  dilTIAZem, 30 mg, Oral, Q6H  insulin detemir, 10 Units, Subcutaneous, Q12H  insulin lispro, 0-9 Units, Subcutaneous, Q6H  ipratropium, 2 puff, Inhalation, 4x Daily - RT  levothyroxine, 88 mcg, Oral, Q AM  melatonin, 3 mg, Oral, Nightly  metoclopramide, 10 mg, Intravenous, Q6H  pantoprazole, 40 mg, Intravenous, BID  vitamin B-12, 1,000 mcg, Oral, Daily  vitamin C, 500 mg, Oral, BID  zinc sulfate, 220 mg, Oral, Daily      dexmedetomidine, 0.2-1.5 mcg/kg/hr, Last Rate: 0.85 mcg/kg/hr (12/07/20 0848)  fentanyl 10 mcg/mL,  mcg/hr, Last Rate: 200 mcg/hr (12/07/20 0652)  midazolam, 1-10 mg/hr, Last Rate: 6 mg/hr (12/07/20 0652)  norepinephrine, 0.02-0.3 mcg/kg/min, Last Rate: 0.08 mcg/kg/min (12/07/20 0615)      Review of Systems:     Cannot obtain due to mechanical ventilation.  The patient notably is critically ill and connected to a ventilator.  As such  patient cannot communicate and provide any history whatsoever, including any history of present illness or interval history since arrival or review of systems. The interested reviewer may note this fact, as an attempt has been made at collecting and documenting these portions of the patient history, but this information is unobtainable despite attempted review and therefore cannot be documented at this time.     Ventilator Settings:        Resp Rate (Set): 20  Pressure Support (cm H2O): 0 cm H20  FiO2 (%): 85 %  PEEP/CPAP (cm H2O): 10 cm H20  Minute Ventilation (L/min) (Obs): 11.1 L/min  Resp Rate (Observed) Vent: 28  I:E Ratio (Set): 1:2.30  I:E Ratio (Obs): 1:2.3  PIP Observed (cm H2O): 35 cm H2O     Physical Exam:  Temp:  [95.5 °F (35.3 °C)-98.2 °F (36.8 °C)] 95.9 °F (35.5 °C)  Heart Rate:  [] 65  Resp:  [17-22] 21  BP: ()/(44-63) 105/54  FiO2 (%):  [50 %-90 %] 85 %    Intake/Output Summary (Last 24 hours) at 12/7/2020 1003  Last data filed at 12/7/2020 0832  Gross per 24 hour   Intake 2651.9 ml   Output 1300 ml   Net 1351.9 ml     SpO2 Percentage    12/07/20 0930 12/07/20 0945 12/07/20 1000   SpO2: (!) 85% 95% (!) 88%      GENERAL/CONSTITUTIONAL: no distress.  Pt is on vent  HEENT: atraumatic, normocephalic  NOSE: normal  NECK: jugular veins nondistended  CHEST: no paradox, no retractions.  No respiratory distress.   Vent synchrony  CARDIAC: Irregular rhythm  ABDOMEN: nondistended  : Deferred  EXTREMITIES: Mild edema.  NEURO:  sedated, mechanically ventilated  SKIN: no jaundice.  No rash   Results from last 7 days   Lab Units 12/06/20  0435 12/04/20  0316 12/03/20  0408   WBC 10*3/mm3 15.79* 15.01* 17.45*   HEMOGLOBIN g/dL 7.0* 7.3* 8.2*   PLATELETS 10*3/mm3 104* 94* 91*     Results from last 7 days   Lab Units 12/06/20  0435 12/04/20  0316 12/03/20  0408   SODIUM mmol/L 133* 133* 137   POTASSIUM mmol/L 5.1 5.1 4.9   BUN mg/dL 50* 46* 49*   CREATININE mg/dL 0.73 0.52* 0.52*     Results from last 7  days   Lab Units 12/07/20  0340 12/04/20  0446   PH, ARTERIAL pH units 7.321* 7.376   PCO2, ARTERIAL mm Hg 55.0* 49.4*   PO2 ART mm Hg 83.6 55.8*   FIO2 % 100 70     Blood Culture   Date Value Ref Range Status   11/29/2020 No growth at less than 24 hours  Preliminary   11/29/2020 No growth at less than 24 hours  Preliminary     Recent films:  No radiology results for the last day  Films reviewed personally by me.  My interpretation: no new imaging, 12-6-20    Pulmonary Assessment:    1. Acute respiratory failure with hypoxia due to Covid-19  2. Atrial fibrillation, new onset  3. Leukocytosis  4. Hypothyroidism  5. Advanced age  6. Thrombocytopenia  7. Hyperkalemia   8. Anemia   9. +BC gram-positive cocci in pairs and chains    Recommend:     · Day #8 ETT of 2nd intubation.  Continue mechanical ventilation.  Wean FiO2 down if able, currently on 0.85  · Titrate FiO2 for sat greater than 90   · Continue bronchodilators with Atrovent and Symbicort. Avoid albuterol due to risk of tachycardia.  · Candidate for prone positioning: no  · Remdesivir course, dexamethasone 10-day course completed  · Dexamethasone resumed 11/29/20 for worsening respiratory status requiring re-intubation, currently on 2 mg, wean off in the next 48 hours  -12/08/20  · Other antibiotics per ID  · DVT prophylaxis-Lovenox on hold   · Stress ulcer prophylaxis - protonix   · Continue zinc, vitamin C, melatonin, vitamin D  · Nutrition per Dobbhoff tube -continue as tolerated  · Patients prognosis is extraordinarily poor with no progress over the past several days, probably some retroaggression.  Continue supportive care.  Family continues to wish for aggressive therapy  · Dr. Covington plans for conference call with Daughter, He, and Attending. He will facilitate     Electronically signed by RUSSELL Fermin on 12/7/2020 at 10:03 CST      ATTESTATION OF CLINICAL NOTE:  I have reviewed the notes, assessments, and/or procedures performed by  RUSSELL Escalante, I concur with her/his documentation of Rashard Mobley.    Patient was seen in the follow-up visit in pulmonary rounds in COVID-19 isolation unit today.  Her chart reviewed and current events noted.  She is seen from outside the glass door to reduce the risk of cross infection and exposure and to minimize the use of PPE.  She remains on full assist control ventilation and currently requiring high amount of PEEP and FiO2.  She completed all the treatment for the COVID-19 but not showing much improvement.  Her family still wanted to continue the full treatment and she is still a full code.  The patient is currently getting clindamycin and aztreonam for antibiotic coverage.    Per nursing assessment and visual evaluation  Physical Exam  Vitals signs and nursing note reviewed.   Constitutional:       Appearance: She is well-developed.      Comments: Sedated intubated on ventilator.  HENT:      Head: Normocephalic and atraumatic.   Neck:      Musculoskeletal: Normal range of motion.   Cardiovascular:      Rate and Rhythm: Normal rate. Rhythm irregular.   Abdominal:      General: There is no distension.   Neurological:      Mental Status: She is on ventilator could not be assessed.      Patient is not doing very well and has increased oxygen requirement with progressive ARDS.  She is currently on Levophed, fentanyl, Versed and Precedex drip and still having problem with oxygenation.  I talked with respiratory therapy and I increase the PEEP to 12 and FiO2 has been increased to 100%.  She will continue full ventilator support without any possibility of weaning at this time.  She completed treatment for COVID-19 and continues to deteriorate.  I believe we need to talk to the family with the  and the daughter about discussing the possible palliative care.  Continue current treatment and supportive care and supportive respiratory care and bronchodilator treatment.  Overall prognosis guarded to  poor.  Pulmonary team will continue following her and make further recommendations.    I have seen and examined patient personally, performing a face-to-face diagnostic evaluation with plan of care reviewed and developed with APRN and nursing staff. I have addended and/or modified the above history of present illness, physical examination, and assessment and plan to reflect my findings and impressions. Essential elements of the care plan were discussed with APRN above.  Agree with findings and assessment/plan as documented above.    Verenice Covington MD  Pulmonologist/Intensivist  12/7/2020 10:59 CST

## 2020-12-07 NOTE — PROGRESS NOTES
"Palliative Care Daily Progress Note   goals of care/advanced care planning, support for patient/family, withdrawal of interventions and comfort care    Code Status and Medical Interventions:   Ordered at: 12/07/20 7862     Level Of Support Discussed With:    Next of Kin (If No Surrogate)     Code Status:    No CPR     Medical Interventions (Level of Support Prior to Arrest):    Comfort Measures     Comments:    spouse-Elie and daughter-Dr. Mobley      Advanced Directives: Advance Directive Status: Patient does not have advance directive   Goals of Care: Ongoing.     S: Medical record reviewed. Events noted. Seen earlier this morning. Now intubated on ventilator support since last time I seen her. Ventilator needs high oxygenation needs FiO2-85%, O2 Sats 84% currently. Further respiratory decline throughout the day and now FiO2-100%. Dr. Infante had the opportunity to speak with spouse and daughter who have elected to transition with de-escalation of care this afternoon. Due to the Palliative Care Topics Discussed: palliative care, goals of care, care options and resuscitation status we will establish an advance care plan.   Advance Care Planning   Advance Care Planning Discussion: Spouse and daughter currently at bedside. Family have elected to transition to comfort measures and pursue palliative extubation. \"If there is no more that can be done then I want to keep her comfortable\". Discussed at length high oxygenation needs and despite all supportive care interventions patient has continued to experience further decline in respiratory status-Currently FiO2-100%. Questions encouraged and support given.       Review of Systems   Unable to perform ROS: intubated     Pain Assessment  Nonverbal Indicators of Pain: restless  CPOT and PAINAD Scales: CPOT (Critical-Care Pain Observation Tool)  CPOT Facial Expression: 0-->relaxed, neutral  CPOT Body Movements: 0-->absence of movements  CPOT Muscle Tension: " "0-->relaxed  Ventilator Compliance/Vocalization: 0-->tolerating ventilator or movement  CPOT Score: 0  Pain Location: hand(left)    O:     Intake/Output Summary (Last 24 hours) at 12/7/2020 1452  Last data filed at 12/7/2020 1201  Gross per 24 hour   Intake 2795.9 ml   Output 1250 ml   Net 1545.9 ml       Diagnostics: Reviewed    Current Facility-Administered Medications   Medication Dose Route Frequency Provider Last Rate Last Admin   • acetaminophen (TYLENOL) tablet 650 mg  650 mg Oral Q4H PRN Pascual Diego MD   650 mg at 11/16/20 1639    Or   • acetaminophen (TYLENOL) suppository 650 mg  650 mg Rectal Q4H PRN Pascual Diego MD       • albuterol sulfate HFA (PROVENTIL HFA;VENTOLIN HFA;PROAIR HFA) inhaler 2 puff  2 puff Inhalation Q6H PRN Pascual Diego MD       • fentaNYL citrate (PF) (SUBLIMAZE) 2,500 mcg in sodium chloride 0.9 % 250 mL (10 mcg/mL) infusion   mcg/hr Intravenous Titrated Melissa Cunha APRN 20 mL/hr at 12/07/20 0814 200 mcg/hr at 12/07/20 0814   • haloperidol lactate (HALDOL) injection 1 mg  1 mg Intravenous Once Melissa Cunha APRN       • ipratropium (ATROVENT HFA) inhaler 2 puff  2 puff Inhalation Q4H PRN Melissa Cunha APRN       • midazolam (VERSED) 250 mg in sodium chloride 0.9 % 250 mL infusion  1-10 mg/hr Intravenous Titrated Nikolas Boogie MD 6 mL/hr at 12/07/20 0652 6 mg/hr at 12/07/20 0652     fentanyl 10 mcg/mL,  mcg/hr, Last Rate: 200 mcg/hr (12/07/20 0814)  midazolam, 1-10 mg/hr, Last Rate: 6 mg/hr (12/07/20 0652)      •  acetaminophen **OR** acetaminophen  •  albuterol sulfate HFA  •  ipratropium    A:    /52   Pulse 62   Temp 97.3 °F (36.3 °C)   Resp 20   Ht 152.4 cm (60\")   Wt 76 kg (167 lb 8.8 oz)   SpO2 100%   BMI 32.72 kg/m²     Assessment completed through the glass in an effort to limit the use of PPE, exposure, and cross-contamination due to COVID-19.  Vitals signs and nursing note reviewed. "   Constitutional:       General: Sleeping.      Appearance: Frail. Acutely ill-appearing.      Comments: Currently on ventilator support-Fio2 100% O2 sats 85%.  Currently appears to be resting comfortably. Precedex, versed, fentanyl, and levophed infusing  Eyes:      General: Lids are normal.   HENT:      Head: Normocephalic and atraumatic.   Neck:      Vascular: No JVD.   Pulmonary:      Effort: Tachypnea present.   Cardiovascular:      Normal rate  Musculoskeletal: Normal range of motion.   Skin:     Comments: No documented skin injuries   Genitourinary:     Comments: Yee catheter in place  Neurological:      Comments: Nursing reports agitation with sedation vacation   Psychiatric:      Comments: Appears sedated and calm currently      Patient status: Disease state: Deteriorating despite treatments.  Functional status: Palliative Performance Scale Score: Performance 30% based on the following measures: Ambulation: Totally bed bound, Activity and Evidence of Disease: Unable to do any work, extensive evidence of disease, Self-Care: Total care required,  Intake: Reduced, LOC: Full, drowsy or confusion   Nutritional status: Albumin 3.0. Body mass index is 29.15 kg/m².      Family support: The patient receives support from her  and daughter..  Advance Directives: Advance Directive Status: Patient does not have advance directive   POA/Healthcare surrogate-Next of kin, spouse-Elie.    Impression/Problem List:    1. Acute hypoxic respiratory failure secondary to COVID-19-worse  2. COVID-19 pneumonia  3. Atrial fibrillation with RVR-more irregular today with increased ectopy  4. Septic shock on Levophed  5. Type 2 diabetes mellitus  6. Hypothyroidism  7. Advanced age  8. Hypertension     PPS:  10%     Symptoms:  Tachypnea  Debility  Agitation requiring sedation     Recommendations/Plan:  1. plan: Goals of care include CODE STATUS No CPR/Comfort measures.    2.  Palliative care encounter  -Spouse and daughter  "currently at bedside. Family have elected to transition to comfort measures and pursue palliative extubation. \"If there is no more that can be done then I want to keep her comfortable\". Discussed at length high oxygenation needs and despite all supportive care interventions patient has continued to experience further decline in respiratory status-Currently FiO2-100%.    3.  Comfort care  -Will discontinue ventilator support and extubate. Oxygen via nasal cannula as needed comfort. Discussed with nursing.  -Haldol 1 mg IV x 1 now for agitation pre-extubation and as needed.  -Will d/c precedex and levophed infusions.  -Will continue to titrate fentanyl and versed infusions for comfort.  -antisecretory adjuvants as needed.  -Maintain verde catheter.  -Discontinue all treatments/medications not in line with comfort.      Thank you for allowing us to participate in patient's plan of care. Palliative Care Team will continue to follow patient.     Time spent:40 minutes spent reviewing medical and medication records, assessing and examining patient, discussing with family, answering questions, providing some guidance about a plan and documentation of care, and coordinating care with other healthcare members, with > 50% time spent face to face.   Greater than 18  minutes spent on advance care planning.    Melissa Cunha, APRN  12/7/2020  "

## 2020-12-07 NOTE — PROGRESS NOTES
[facility] Medicine Services  INPATIENT PROGRESS NOTE    Length of Stay: 23  Date of Admission: 11/14/2020  Primary Care Physician: Jimy Montano MD    Subjective     Chief Complaint:     Respiratory failure secondary to COVID-19 pneumonia    HPI     The patient remains intubated and sedated.  LTAC referral was requested but apparently the patient's oxygen requirement is too high for them to meet.  The patient is on 85% O2 with PEEP of 10.  Hemoglobin dropped to 7.0 and the patient was administered 1 unit of PRBCs.  Platelet count 104,000.  Both remdesivir and dexamethasone courses have been completed.  Dexamethasone was resumed on 11/29 due to a worsening respiratory status.  She is currently weaning dexamethasone and is on 2 mg.  Complete weaning planned within the next 48 hours.  The patient has made no progress.  The patient's  continues to request aggressive treatment.  It is unlikely that the patient well ever be weaned more from the ventilator.  A care conference is planned for tomorrow to discuss further management with the patient's daughter and .  I attempted to call the patient's  for discussion about patient's condition today.  I called both his home number and his cell phone number and neither were answered.  The patient has had difficulty with hypoglycemia.  The patient has been unable to tolerate tube feeding at goal rate despite the addition of prokinetic agents.  The patient's prognosis is exceedingly poor at this point.    Review of Systems     All pertinent negatives and positives are as above. All other systems have been reviewed and are negative unless otherwise stated.     Objective    Temp:  [95.5 °F (35.3 °C)-98.2 °F (36.8 °C)] 95.9 °F (35.5 °C)  Heart Rate:  [] 65  Resp:  [17-22] 20  BP: ()/(44-63) 105/54  FiO2 (%):  [50 %-90 %] 85 %    Lab Results (last 24 hours)     Procedure Component Value Units Date/Time     POC Glucose Once [[id number]]  (Normal) Collected: 12/07/20 1026    Specimen: Blood Updated: 12/07/20 1032     Glucose 93 mg/dL      Comment: : CHERYLE BrewerMeter ID: [id number]       POC Glucose Once [[id number]]  (Abnormal) Collected: 12/07/20 0831    Specimen: Blood Updated: 12/07/20 0839     Glucose 175 mg/dL      Comment: : CHERYLE BrewerMeter ID: [id number]       POC Glucose Once [[id number]]  (Abnormal) Collected: 12/07/20 0759    Specimen: Blood Updated: 12/07/20 0819     Glucose 63 mg/dL      Comment: : CHERYLE BrewerMeter ID: [id number]       POC Glucose Once [[id number]]  (Abnormal) Collected: 12/07/20 0650    Specimen: Blood Updated: 12/07/20 0702     Glucose 167 mg/dL      Comment: : [id number] Nba HortonherMeter ID: [id number]       POC Glucose Once [[id number]]  (Abnormal) Collected: 12/07/20 0649    Specimen: Blood Updated: 12/07/20 0701     Glucose 354 mg/dL      Comment: : [id number] Nba HortonherMeter ID: [id number]       POC Glucose Once [[id number]]  (Abnormal) Collected: 12/07/20 0640    Specimen: Blood Updated: 12/07/20 0701     Glucose 51 mg/dL      Comment: : [id number] Nba SolherMeter ID: [id number]       Blood Gas, Arterial - [[id number]]  (Abnormal) Collected: 12/07/20 0340    Specimen: Arterial Blood Updated: 12/07/20 0349     Site Right Radial     Fran's Test Positive     pH, Arterial 7.321 pH units      Comment: 84 Value below reference range        pCO2, Arterial 55.0 mm Hg      Comment: 83 Value above reference range        pO2, Arterial 83.6 mm Hg      HCO3, Arterial 28.3 mmol/L      Comment: 83 Value above reference range        Base Excess, Arterial 1.7 mmol/L      O2 Saturation, Arterial 97.0 %      Temperature 37.0 C      Barometric Pressure for Blood Gas 750 mmHg      Modality Ventilator     FIO2 100 %      Ventilator Mode AC     Set Tidal Volume 500     Set Mech Resp Rate 20.0     PEEP 10.0     Collected by  [id number]     Comment: Meter: [id number]     :  [id number]        pCO2, Temperature Corrected 55.0 mm Hg      pH, Temp Corrected 7.321 pH Units      pO2, Temperature Corrected 83.6 mm Hg     POC Glucose Once [[id number]]  (Normal) Collected: 12/07/20 0051    Specimen: Blood Updated: 12/07/20 0101     Glucose 72 mg/dL      Comment: : [id number] Nba HeatherMeter ID: [id number]       POC Glucose Once [[id number]]  (Normal) Collected: 12/06/20 1752    Specimen: Blood Updated: 12/06/20 1802     Glucose 112 mg/dL      Comment: : IZA Eaton RyanMeter ID: [id number]       POC Glucose Once [[id number]]  (Normal) Collected: 12/06/20 1221    Specimen: Blood Updated: 12/06/20 1233     Glucose 109 mg/dL      Comment: : IZA Eaton RyanMeter ID: [id number]             Imaging Results (Last 24 Hours)     ** No results found for the last 24 hours. **             Intake/Output Summary (Last 24 hours) at 12/7/2020 1043  Last data filed at 12/7/2020 0832  Gross per 24 hour   Intake 2651.9 ml   Output 1300 ml   Net 1351.9 ml       Physical Exam  Constitutional:       Appearance: She is obese. She is ill-appearing.      Interventions: She is sedated and intubated.   HENT:      Right Ear: External ear normal.      Left Ear: External ear normal.      Mouth/Throat:      Mouth: Mucous membranes are dry.      Pharynx: Oropharynx is clear.   Eyes:      General: No scleral icterus.     Conjunctiva/sclera: Conjunctivae normal.   Neck:      Musculoskeletal: Neck supple.   Cardiovascular:      Rate and Rhythm: Normal rate and regular rhythm.      Heart sounds: Normal heart sounds.   Pulmonary:      Effort: She is intubated.      Breath sounds: Decreased breath sounds present bilaterally.   Abdominal:      General: Abdomen is flat. Bowel sounds are normal.      Palpations: Abdomen is soft. There is no mass.   Musculoskeletal:         General: Swelling (BUE) present. No deformity.   Lymphadenopathy:      Cervical:  No cervical adenopathy.   Skin:     General: Skin is warm and dry.      Findings: Bruising (LUE and forearm.)     Results Review:  I have reviewed the labs, radiology results, and diagnostic studies since my last progress note and made treatment changes reflective of the results.   I have reviewed the current medications.    Assessment/Plan     Active Hospital Problems    Diagnosis   • **Pneumonia due to COVID-19 virus   • Hyperkalemia   • Thrombocytopenia (CMS/HCC)   • Anemia   • Acute respiratory failure with hypoxia (CMS/HCC)   • Hypernatremia   • Leukocytosis   • Advanced age   • Atrial fibrillation with RVR (CMS/HCC)   • Diabetes (CMS/HCC)   • Essential hypertension       PLAN:  Continue Dobbhoff tube feedings  Continue to wean dexamethasone  Continue IV antibiotics per infectious diseases  Ventilator management per pulmonology  Continue to attempt to contact the patient's  regarding current medical management    Electronically signed by Ramiro Prieto DO, 12/07/20, 10:43 CST.

## 2020-12-07 NOTE — PROGRESS NOTES
Continued Stay Note   Melissa     Patient Name: Rashard Mobley  MRN: [id number]  Today's Date: 12/7/2020    Admit Date: 11/14/2020    Discharge Plan     Row Name 12/07/20 1033       Plan    Plan Comments  PEDRO LUIS consulted for LTAC referral. Spoke with Annie with [facility]. PT's FiO2 is too high for LTAC at this time. LTAC to follow and consider when PT meets criteria.        Discharge Codes    No documentation.             BEVERLEY Mccarthy

## 2020-12-08 LAB
BH BB BLOOD EXPIRATION DATE: NORMAL
BH BB BLOOD TYPE BARCODE: 5100
BH BB DISPENSE STATUS: NORMAL
BH BB PRODUCT CODE: NORMAL
BH BB UNIT NUMBER: NORMAL
CROSSMATCH INTERPRETATION: NORMAL
UNIT  ABO: NORMAL
UNIT  RH: NORMAL